# Patient Record
Sex: FEMALE | Race: BLACK OR AFRICAN AMERICAN | NOT HISPANIC OR LATINO | Employment: FULL TIME | URBAN - METROPOLITAN AREA
[De-identification: names, ages, dates, MRNs, and addresses within clinical notes are randomized per-mention and may not be internally consistent; named-entity substitution may affect disease eponyms.]

---

## 2023-10-18 ENCOUNTER — NUTRITION (OUTPATIENT)
Dept: SURGERY | Facility: CLINIC | Age: 38
End: 2023-10-18
Payer: COMMERCIAL

## 2023-10-18 VITALS — HEIGHT: 68 IN | BODY MASS INDEX: 42.28 KG/M2 | WEIGHT: 279 LBS

## 2023-10-18 NOTE — PROGRESS NOTES
"PREOPERATIVE, MULTIDISCIPLINARY, MEDICALLY SUPERVISED, REDUCED CALORIE DIET, BEHAVIOR MODIFICATION AND EXERCISE PROGRAM    S:  The patient has been working on modifying her diet.  24 hour food recall: B: 2 boiled eggs; L: grilled chicken salad with light dressing.; D: baked skinless nikos hen, white rice and steamed vegetables.    O:     Vitals:    10/18/23 1505   Weight: 127 kg (279 lb)    Ht:   1.727 m (5' 8\")     BMI: Body mass index is 42.42 kg/m².    Goal: 5% body weight loss over the course of program    Dietary recommendation:   1. Continue to drink at least 64 ounces of water and calorie free, non-carbonated, and decaffeinated beverages  2. Practice the 30-30-30 rule by drinking between meals.  3. Structure your meal plan - have 3 meals and 1 snack daily.  Focus meals on lean protein and produce and reduce starch portions.  4. Have balanced meals that always contain a good source of protein.  5. Increase intake of non-starchy vegetables.  Have 5 servings fruits and vegetables daily.   6. Continue to take a multivitamin daily.  7. Increase physical activity by 10-15 minutes to an end goal of 60 minutes 5 x per week.    Group Topic: Eating Triggers and Controlling Environment    Behavioral recommendation: Pt will be able to identify eating triggers and how to avoid them.    A/P: Pt appears to be able to recognize eating triggers and how to avoid eating when not hungry and/or not eating when it is not time for a meal or snack. Pt will continue to practice being mindful of healthy eating habits.    Exercise: Walking 3x/wk for 45 min. And light weights for 15 min.    Follow-up on 11/15/23 (3/6 mswl).    Aicha Jarvis, MARCELINO, LD  "

## 2023-11-08 ENCOUNTER — DOCUMENTATION (OUTPATIENT)
Dept: SURGERY | Facility: CLINIC | Age: 38
End: 2023-11-08
Payer: COMMERCIAL

## 2023-11-09 ENCOUNTER — APPOINTMENT (OUTPATIENT)
Dept: BEHAVIORAL HEALTH | Facility: CLINIC | Age: 38
End: 2023-11-09
Payer: COMMERCIAL

## 2023-11-15 ENCOUNTER — NUTRITION (OUTPATIENT)
Dept: SURGERY | Facility: CLINIC | Age: 38
End: 2023-11-15
Payer: COMMERCIAL

## 2023-11-15 VITALS — HEIGHT: 68 IN | WEIGHT: 276 LBS | BODY MASS INDEX: 41.83 KG/M2

## 2023-11-15 NOTE — PROGRESS NOTES
"PREOPERATIVE, MULTIDISCIPLINARY, MEDICALLY SUPERVISED, REDUCED CALORIE DIET, BEHAVIOR MODIFICATION AND EXERCISE PROGRAM    S:  The patient is working on practicing pre-op behaviors.  24 hour food recall:  B: 2 boiled eggs with fruit cup in own juice; s: protein snack with cheese and nuts; L: 2 eggs, slice turkey patel and 2 slice multigrain toast; D: beef with noodles and gravy  O:     Vitals:    11/15/23 1506   Weight: 125 kg (276 lb)    Ht:   1.727 m (5' 8\")     BMI: Body mass index is 41.97 kg/m².    Goal: 5% body weight loss over the course of program    Dietary recommendation:   1. Continue to drink at least 64 ounces of water and calorie free, non-carbonated, and decaffeinated beverages  2. Practice the 30-30-30 rule by drinking between meals.  3. Structure your meal plan - have 3 meals and 1 snack daily.  4. Have balanced meals that always contain a good source of protein.  Focus meals mainly on protein and vegetables at lunch and dinner.  5. Increase intake of non-starchy vegetables.  Have 5 servings fruits and vegetables daily.   6. Continue to take a multivitamin daily.  7. Increase physical activity by 10-15 minutes to an end goal of 60 minutes 5 x per week.    Group Topic: Healthy Holiday Meal Planning    Behavioral recommendation: Patient is encouraged to choose healthy foods, along with ways to modify recipes as part of meal planning during the holiday season.     A/P: Pt appears to have a good understanding of how to incorporate health foods as part of holiday meal plans into her daily meal pattern.  Pt has a goal to consume Healthier choices as part of the holiday meal plans in their appropriate portion sizes.    Exercise: Walking 3x/wk for 60 min.      Aicha Jarvis, MARCELINO, LD  "

## 2023-12-15 ENCOUNTER — NUTRITION (OUTPATIENT)
Dept: SURGERY | Facility: CLINIC | Age: 38
End: 2023-12-15
Payer: COMMERCIAL

## 2023-12-15 VITALS — WEIGHT: 275 LBS | HEIGHT: 68 IN | BODY MASS INDEX: 41.68 KG/M2

## 2023-12-15 NOTE — PROGRESS NOTES
"PREOPERATIVE, MULTIDISCIPLINARY, MEDICALLY SUPERVISED, REDUCED CALORIE DIET, BEHAVIOR MODIFICATION AND EXERCISE PROGRAM    S:  The patient is working on modifying her diet.   24 hour food recall: B: sf applesauce; s: handful Chex mix; L: sliced turkey on wheat bread with lettuce and light butts; D: baked skinless chicken, green beans and mac and cheese    O:    Vitals:    12/15/23 1104   Weight: 125 kg (275 lb)      Ht:  1.727 m (5' 8\")   BMI:  Body mass index is 41.81 kg/m².    Goal: 5% body weight loss over the course of program    Dietary recommendation:   1. Continue to drink at least 64 ounces water and calorie free, non-carbonated, and decaffeinated beverages  2. Practice the 30-30-30 rule by drinking between meals.  3. Structure your meal plan - have 3 meals and 1 snack daily.  4. Have balanced meals that always contain a good source of protein. Try Greek yogurt, eggs or Quest protein bar for breakfast and focus on eating at least 3 oz. Lean protein coupled with produce at lunch and dinner.  5. Increase intake of non-starchy vegetables.  Have 5 servings fruits and vegetables daily.   6. Continue to take a multivitamin daily.  7. Increase physical activity by 10-15 minutes to an end goal of 60 minutes 5 x per week.    Group Topic: Emotional Eating    Behavioral recommendation: Pt is encouraged to practice identifying the differences between emotional hunger and physical hunger and to implement distraction and confrontation techniques to avoid emotional eating.     A/P: Pt appears to have a good understanding of how to characterize the two different types of hunger.  Pt has a goal to keep a food diary and to utilize at least two of the techniques learned in class to deter emotional eating.    Exercise: walking 15 min. and light resistance exercise 30 min. 4/wk    Follow-up next month (5/6 mswl visits)    Aicha Jarvis RD, LD  "

## 2024-01-18 ENCOUNTER — NUTRITION (OUTPATIENT)
Dept: SURGERY | Facility: CLINIC | Age: 39
End: 2024-01-18
Payer: COMMERCIAL

## 2024-01-18 VITALS — BODY MASS INDEX: 41.68 KG/M2 | WEIGHT: 275 LBS | HEIGHT: 68 IN

## 2024-01-18 NOTE — PROGRESS NOTES
"PREOPERATIVE, MULTIDISCIPLINARY, MEDICALLY SUPERVISED, REDUCED CALORIE DIET, BEHAVIOR MODIFICATION AND EXERCISE PROGRAM    S:  The patient is working on modifying her diet.  24 hour food recall: B: 2 eggs, 2 sausage; L skipped lunch; D: baked skinless chicken, greens, spaghetti with ground turkey.    O:    Vitals:    01/18/24 1105   Weight: 125 kg (275 lb)    Ht:   1.727 m (5' 8\")    BMI: Body mass index is 41.81 kg/m².    Goal: 5% body weight loss over the course of program    Dietary recommendation:   1. Continue to work on drinking calorie fee, non-carbonated, and decaffeinated beverages  2. Practice the 30-30-30 rule by drinking between meals.  3. Structure your meal plan - have 3 meals and 1 snack daily. Measure food portions.  Try Healthy Choice frozen light protein based meals and use tray to help portion homemade meals.  Limit starch to 1/2 cup or less per meal and eat 3 meals daily each with 15-30 grams protein. Try Greek yogurt, low fat cottage cheese or tuna when you want a lighter meal.  4. Have balanced meals that always contain a good source of protein.  5. Increase intake of non-starchy vegetables.  Have 5 servings fruits and vegetables daily.   6. Continue to take a multivitamin daily.  7. Increase physical activity by 10-15 minutes to an end goal of 60 minutes 5 x per week.    Group Topic: Label Reading   Behavioral recommendation: Pt is encouraged to read labels regularly to identify the key information on food label ingredient lists; such as fats, carbohydrates, protein and serving sizes.    A/P: Pt appears to have a good understanding of how to read labels for important nutritional information. Pt has a goal to read labels at home or when purchasing food at the supermarket.    Exercise: walking 15-20 min. 3x/wk    Scheduled for [facility] zoom on 2/07/24 and will follow-up in 1 month for 6/6 mswl visit.    Aicha Jarvis, MARCELINO, LD  "

## 2024-02-07 ENCOUNTER — TELEMEDICINE CLINICAL SUPPORT (OUTPATIENT)
Dept: SURGERY | Facility: CLINIC | Age: 39
End: 2024-02-07
Payer: COMMERCIAL

## 2024-02-07 NOTE — PROGRESS NOTES
PREOPERATIVE, MULTIDISCIPLINARY, MEDICALLY SUPERVISED, REDUCED CALORIE DIET, BEHAVIOR MODIFICATION AND EXERCISE PROGRAM    S:  The patient attended a 60 minute class today to review the 2 week pre-op diet, post-op eating guidelines and post-op vitamin/mineral supplements.     Weight: no weight was reported today.     Dietary recommendation:   1. Continue to work on eating 60-70 grams protein daily.  2. Continue to drink 64 ounces of water and calorie free, non-carbonated and decaffeinated beverages daily.  3. Continue to practice the 30-30-30 rule by drinking between meals.  4. Continue to take your MVI and discontinue taking your MVI 1 week before surgery.  5. Continue regular exercise with a longer term goal of 5 times weekly for 60 minutes.  5. Start the 2 week pre-op diet 2 weeks before your scheduled surgery date.  6. The day before surgery, drink clear fluids only.  No shakes and no red dyes.    A/P: Pt appears to understand the 2 week pre-op diet.  Reviewed post-op vit/mineral supplements and post-op full liquid and the post-op eating progression for the first 6 weeks post-op.  Encouraged patient to follow-up as scheduled before and after surgery.       Aicha Jarvis, MARCELINO, LD

## 2024-02-22 ENCOUNTER — NUTRITION (OUTPATIENT)
Dept: SURGERY | Facility: CLINIC | Age: 39
End: 2024-02-22
Payer: COMMERCIAL

## 2024-02-22 VITALS — BODY MASS INDEX: 41.22 KG/M2 | HEIGHT: 68 IN | WEIGHT: 272 LBS

## 2024-02-22 NOTE — PROGRESS NOTES
"PREOPERATIVE, MULTIDISCIPLINARY, MEDICALLY SUPERVISED, REDUCED CALORIE DIET, BEHAVIOR MODIFICATION AND EXERCISE PROGRAM    S: The patient has been working on practicing pre-op behaviors.  24 hour food recall: B: plain oatmeal and Greek yogurt;   L: salad with tuna and light ranch; D: frozen light turkey meatball.; s: sf applesauce    O:   Vitals:    02/22/24 1107   Weight: 123 kg (272 lb)    Ht:   1.727 m (5' 8\")     BMI: Body mass index is 41.36 kg/m².    Goal: 5% body weight loss over the course of program  Dietary recommendation:   1. Continue to drink at least 64 ounces of water and calorie free, non-carbonated, and decaffeinated beverages  2. Practice the 30-30-30 rule by drinking between meals.  3. Structure your meal plan - have 3 meals and 1 snack daily.  4. Have balanced meals that always contain a good source of protein.  5. Increase intake of non-starchy vegetables.  Have 5 servings fruits and vegetables daily.   6. Continue to take a multivitamin daily.  7. Increase physical activity by 10-15 minutes to an end goal of 60 minutes 5 x per week.    Group Topic: Portion Control   Behavioral recommendation: Pt is encouraged to identify and use the appropriate serving sizes from each food group.    A/P: Pt appears to have a good understanding of how to incorporate appropriate portion sizes into their daily meal pattern.  Pt has a goal to begin weighing and measuring portions until able to visualize the appropriate portion size.    Exercise: 30 min. 3x/wk resistance and 15 min. cardio    The patient has nutritional clearance.  She will follow-up in 1 month for accountability ahead of surgery.    Aicha Jarvis, RD, LD  "

## 2024-03-28 ENCOUNTER — DOCUMENTATION (OUTPATIENT)
Dept: SURGERY | Facility: CLINIC | Age: 39
End: 2024-03-28

## 2024-03-28 ENCOUNTER — NUTRITION (OUTPATIENT)
Dept: SURGERY | Facility: CLINIC | Age: 39
End: 2024-03-28
Payer: COMMERCIAL

## 2024-03-28 VITALS — BODY MASS INDEX: 40.62 KG/M2 | WEIGHT: 268 LBS | HEIGHT: 68 IN

## 2024-03-28 NOTE — PROGRESS NOTES
"PREOPERATIVE, MULTIDISCIPLINARY, MEDICALLY SUPERVISED, REDUCED CALORIE DIET, BEHAVIOR MODIFICATION AND EXERCISE PROGRAM    S:  The patient has been working on practicing pre-op behaviors.  24 hour food recall: B: 3 boiled eggs, 2 slices patel, slices wheat toast with tsp. peanut butter; s:Greek yogurt and grapes; L: salad with cheese, tuna, croutons and regular dressing; s: protein snack pack with sausage and cheese;       D; frozen light chicken meal with broccoli; s: sf popsicle    O:    Vitals:    03/28/24 1058   Weight: 122 kg (268 lb)     Ht:   1.727 m (5' 8\")    BMI: Body mass index is 40.75 kg/m².    Goal: 5% body weight loss over the course of program    Dietary recommendation:   1. Continue to drink at least 64 ounces of water and calorie free, non-carbonated, and decaffeinated beverages  2. Practice the 30-30-30 rule by drinking between meals.  3. Structure your meal plan - have 3 meals and 1 snack daily.  4. Have balanced meals that always contain a good source of protein.  5. Increase intake of non-starchy vegetables.  Have 5 servings fruits and vegetables daily.   6. Continue to take a multivitamin daily.  7. Increase physical activity by 10-15 minutes to an end goal of 60 minutes 5 x per week.    Group Topic: Going Lean With Protein  Behavioral recommendation: Pt is encouraged to identify and recall sources of lean protein.    A/P: Pt appears to have a good understanding of how to incorporate lean protein into their daily meal pattern.  Pt has a goal to add a lean protein source to each meal.    Exercise: cardio and resistance 4x/wk for 20-30 min. Each    Follow-up in 1 month      Aicha Jarvis, MARCELINO, LD  "

## 2024-04-09 ENCOUNTER — TELEMEDICINE (OUTPATIENT)
Dept: BEHAVIORAL HEALTH | Facility: CLINIC | Age: 39
End: 2024-04-09
Payer: COMMERCIAL

## 2024-04-09 DIAGNOSIS — E66.01 PSYCHOLOGICAL FACTORS AFFECTING MORBID OBESITY (MULTI): Primary | ICD-10-CM

## 2024-04-09 DIAGNOSIS — F54 PSYCHOLOGICAL FACTORS AFFECTING MORBID OBESITY (MULTI): Primary | ICD-10-CM

## 2024-04-09 PROCEDURE — 90791 PSYCH DIAGNOSTIC EVALUATION: CPT | Performed by: PSYCHOLOGIST

## 2024-04-09 NOTE — PROGRESS NOTES
BEHAVIORAL HEALTH ASSESSMENT FOR BARIATRIC SURGERY CANDIDACY    DATE OF EVALUATION: April 9, 2024    TIME IN SESSION: 60 minutes each meeting; Psych Assessment 30202    *THIS IS FIRST APPOINTMENT OF TWO NECESSARY TO COMPLETE THE FULL ASSESSMENT. BELOW INFORMATION WAS COLLECTED TODAY AND WILL BE COMBINED WITH DATA COLLECTED AT NEXT APPOINTMENT, TO GENERATE LETTER TO SURGEON AND BARIATRIC TEAM.*    Televideo Informed Consent for psychological evaluation was reviewed with the patient as follows:  There are potential benefits and risks of the use of telephone or video-conferencing that differ from in-person sessions. Specifically, the telephone or televideo system we are using may not be HIPPA compliant and may present limits to patient confidentiality. Confidentiality still applies for telepsychology services, and nobody will record the session without your permission.      Understanding and verbal agreement was attested to by the patient. Patient identity was confirmed using 3 sources, including telephone number, email address and date of birth. Provision of services via telehealth was necessitated by the restrictions on face-to-face visits accompanying the COVID-19 pandemic.     Non-secure Note: The patient has consented to an nonrestricted note.    ID/REFERRAL: Antionette Felix is a 37 yo able-bodied employed Black woman who I had the pleasure of meeting with todayor a Behavioral Health Pre-Surgical Bariatric Eval, at the request of her Bariatric Program.      Weight History  WHEN WEIGHT CONCERNS STARTED AND TRAJECTORY SINCE:  “I have always been heavy set, even as a kid” she gained significant weight when she was pregnant, at 290 lbs, and had trouble losing large amount and keeping weight off.    CAUSES OF WEIGHT GAIN: significant lifestyle changes that were hard to work around.    LOWEST ADULT WEIGHT: 220 lbs in her 20's   HIGHEST ADULT WEIGHT: 290 lbs when she had her daughter; lost a lot of it quickly after due to  water retention and postpartum factors. Did keep off about 50 lbs. for at least a year.     HOW MUCH OF ADULT LIFE SPENT TRYING TO LOSE WEIGHT: much of adult life on weight management either losing or trying to maintain.    HISTORY OF FOOD INSECURITY?:  No   SUCCESSFUL WEIGHT LOSS ATTEMPTS: daily exercise and diet changes to include vegan choices, done with best friend which kept her accountable.    UNSUCCESSFUL WEIGHT LOSS ATTEMPTS: Cabbage diet and a few other fad diets when she was a teen and early 20's, with her mother. Lost some weight, never kept it off.    HX OF COMPENSATORY BEHAVIORS?:   N self-induced vomiting   N laxatives   N diuretics   N diet pills  N excessive exercise  N fasting >24hrs.  N other     Psychosocial History  B/R in [facility], family all still lives close.  CURRENT LIVING SITUATION: her and her 4 year old daughter at home.    SOCIAL RELATIONSHIPS: Has a long term boyfriend. They do not live together. “Very supportive” and has made changes to his cooking to accommodate her diet changes; she cites him as a major support..Boyfriend, good friend, mother and brother live within 5 minutes of her.    SUPPORT PERSON: Boyfriend.    OTHER EMOTIONAL/INSTRUMENTAL SOCIAL SUPPORT: mother and brother. Boss had bariatric surgery and is supportive at work. Her daughter's  plans to keep her daughter for a few days overnight post-surgery.   REACTION OF SUPPORT SYSTEM TO SURGERY:  “very supportive, very focused” on helping her. She said her mother has been reminding her “this is a lifestyle change.”    HOW SURGERY MIGHT AFFECT RELATIONSHIPS: thinks it will improve, that the content of her family's humor will change, but not much else.    TRANSPORTATION: reliable vehicle   RESPONSIBILITIES: parent, work   HOW FAR DID YOU GO IN SCHOOL?: part college and completed [employer] training. Certification lapsed.  EMPLOYMENT: working with [employer] for 14 years “I'm all about positive mental health”. 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 Worked with this agency for 1 year, last agency for several years.   FINANCIAL SECURITY: stable income through work, stable housing and food security.   LEGAL PROBLEMS: none    Psychological Status: Ms Felix states that their history is negative for obsessive-compulsive disorder, eating disorders, rosalio, thought disorders, and alcohol and drug abuse.     Mental Status Exam:  Orientation:  Alert. Oriented x3.  Memory: intact.  Attention/Concentration: Normal/ Good.  Appearance:  Well-groomed. Casually Dressed. Good hygiene.   Behavior/Attitude: Cooperative. Pleasant. Good eye contact.  Motor: Relaxed. Calm. Normal motor activity.   Speech: Regular rate and volume. Fluent. No pressure.   Mood: Euthymic  Affect: Congruent to stated mood.   Thought process: Goal-directed. Linear. Organized.  Thought content: No paranoia, delusion or ideas of reference. No hallucinations in auditory, visual or other sensory modalities.   Suicidal ideation: denied.  Homicidal ideation: denied.   Insight: Good  Judgment: Good  Fund of knowledge: Average    Plan:  Ms. Felix will RTC within a month to complete the rest of her behavioral health assessment, at which time Letter to Surgeon will be generated.    Ms. Felix to continue with nutrition program and dietician, continue with physical activity and engaging with helpful coping strategies for reinforcement of positive experiences in her weight management efforts.       Tae Rollins, PhD  Clinical Psychologist  Division of Behavioral Medicine  Department of OB/GYN  [facility]  [phone]  Office: [phone]

## 2024-04-11 PROBLEM — E66.01 PSYCHOLOGICAL FACTORS AFFECTING MORBID OBESITY (MULTI): Status: ACTIVE | Noted: 2024-04-11

## 2024-04-11 PROBLEM — F54 PSYCHOLOGICAL FACTORS AFFECTING MORBID OBESITY (MULTI): Status: ACTIVE | Noted: 2024-04-11

## 2024-04-23 ENCOUNTER — TELEPHONE (OUTPATIENT)
Dept: SURGERY | Facility: CLINIC | Age: 39
End: 2024-04-23
Payer: COMMERCIAL

## 2024-04-23 NOTE — TELEPHONE ENCOUNTER
Left message reminding patient of upcoming appointment date/time and nurse contact number given for any further questions/concerns.

## 2024-04-26 ENCOUNTER — NUTRITION (OUTPATIENT)
Dept: SURGERY | Facility: CLINIC | Age: 39
End: 2024-04-26
Payer: COMMERCIAL

## 2024-04-26 VITALS — BODY MASS INDEX: 40.16 KG/M2 | WEIGHT: 265 LBS | HEIGHT: 68 IN

## 2024-04-26 NOTE — PROGRESS NOTES
"PREOPERATIVE, MULTIDISCIPLINARY, MEDICALLY SUPERVISED, REDUCED CALORIE DIET, BEHAVIOR MODIFICATION AND EXERCISE PROGRAM    S:  The patient has been working on pre-op behaviors.  24 hour food recall: B: 2 boiled eggs, Greek yogurt, plain oatmeal with berries; L: salad with egg, cheese, tuna and dressing sparingly; s; slim yodit; D: grilled skinless chicken and greens    O:    Vitals:    04/26/24 1035   Weight: 120 kg (265 lb)    Ht:   1.727 m (5' 8\")  BMI: Body mass index is 40.29 kg/m².    Goal: 5% body weight loss over the course of program    Dietary recommendation:   1. Continue to drink at least 64 ounces of water and calorie free, non-carbonated, and decaffeinated beverages  2. Practice the 30-30-30 rule by drinking between meals.  3. Structure your meal plan - have 3 meals and 1 snack daily.  4. Have balanced meals that always contain a good source of protein.  5. Increase intake of non-starchy vegetables.  Have 5 servings fruits and vegetables daily.   6. Continue to take a multivitamin daily.  7. Increase physical activity by 10-15 minutes to an end goal of 60 minutes 5 x per week.    Group Topic: Get Movin'    Behavioral recommendation: Pt is encouraged to identify and try different types of physical activity.    A/P: Pt appears to have a good understanding of how to incorporate physical activity into their daily schedule.  Pt has a goal to begin making small time commitments each week to fit in 30 minutes of exercise on as many days as possible.    Exercise: resistance 45-60 min. 3x/wk and walking 1-2x/wk for 30-45 min.    The patient has previous nutritional clearance and will follow-up in 1 month.    Aicha Jarvis, RD, LD  "

## 2024-04-29 ENCOUNTER — APPOINTMENT (OUTPATIENT)
Dept: BEHAVIORAL HEALTH | Facility: CLINIC | Age: 39
End: 2024-04-29
Payer: COMMERCIAL

## 2024-04-29 DIAGNOSIS — F54 PSYCHOLOGICAL FACTORS AFFECTING MORBID OBESITY (MULTI): Primary | ICD-10-CM

## 2024-04-29 DIAGNOSIS — E66.01 PSYCHOLOGICAL FACTORS AFFECTING MORBID OBESITY (MULTI): Primary | ICD-10-CM

## 2024-04-29 PROCEDURE — 90791 PSYCH DIAGNOSTIC EVALUATION: CPT | Performed by: PSYCHOLOGIST

## 2024-04-30 ENCOUNTER — TELEPHONE (OUTPATIENT)
Dept: SURGERY | Facility: CLINIC | Age: 39
End: 2024-04-30
Payer: COMMERCIAL

## 2024-04-30 NOTE — PROGRESS NOTES
"Subjective   Date: 4/30/2024 Time: 12:19 PM  Name: Antionette Felix  MRN: [id number]    *Attempted to contact X 2, left message 4/30/2024*  This is a 38 y.o. female with morbid obesity BMI ~ 40 who presents to clinic for consideration of bariatric surgery. Initially considered WLS 4/2023, but did not complete the process. she has attempted and failed multiple diet and exercise regimens for weight loss. Initial Onset of obesity was {Initial Obesity Onset:25849:::0}.  Their goal for surgery is to  be healthier  and lose weight. The patient has tried multiple diets to lose weight including {Previous Diet Trials:16360}. The patient was most successful with the {Most Successful Diet:55849}. The most pounds lost on this diet were *** lbs. The patient considers their dietary weakness to be {Dietary Weakness:56827} The patient reports a  {Weight Pattern:58260::\"highest weight ever of *** pounds\",\"lowest weight ever of *** pounds\"} {Distribution of Obesity:96751}. Current diet: {Diet:47504}. Compliance: {Compliance:06122} Diet Problems: {Diet Problems:48702} } Dietary Details Include:{Dietary Details:25349} The patient {Exercise Frequency:54247} {Excercise Duration (Optional):13327} {Types of Exercise (Optional):11929}    {Comorbidities:15219}    {Menstrual History (Optional):21201}    {Procedure Preferred:96875}      {GERD QOL PPI USE:16647}    How bad is the heartburn? {GERDQOL:70864}  Heartburn when lying down? {GERDQOL:66429}  Heartburn when standing up? {GERDQOL:85865}  Heartburn after meals? {GERDQOL:64722}  Does heartburn change your diet? {GERDQOL:95852}  Does heartburn wake you from sleep? {GERDQOL:27870}  Do you have difficulty swallowing? {GERDQOL:68650}  Do you have pain with swallowing? {GERDQOL:37184}  If you take medication, does this affect your daily life? {GERDQOL:51206}  How bad is the regurgitation? {GERDQOL:54611}  Regurgitation when lying down? {GERDQOL:66775}  Regurgitation when standing up? " {GERDQOL:77540}  Regurgitation after meals? {GERDQOL:56111}  Does regurgitation change your diet? {GERDQOL:55694}  Does regurgitation wake you from sleep? {GERDQOL:33847}  How satisfied are you with your present condition? {satisfaction GERD QOL:35137}    PMH:   Past Medical History:   Diagnosis Date    Bariatric surgery status     Bariatric surgery status    Personal history of diseases of the skin and subcutaneous tissue 2021    History of eczema        PSH:   Past Surgical History:   Procedure Laterality Date    OTHER SURGICAL HISTORY  2020     section          FAMILY HISTORY:  No family history on file.     SOCIAL HISTORY:       MEDICATIONS:  Prior to Admission Medications:  Medication Documentation Review Audit    **Prior to Admission medications have not yet been reviewed**          ALLERGIES:  Not on File    REVIEW OF SYSTEMS:  GENERAL: Negative for malaise, significant weight loss and fever  HEAD: Negative for headache, swelling.  NECK: Negative for lumps, goiter, pain and significant neck swelling  RESPIRATORY: Negative for cough, wheezing or shortness of breath.  CARDIOVASCULAR: Negative for chest pain, leg swelling or palpitations.  GI: Negative for abdominal discomfort, blood in stools or black stools or change in bowel habits  : No history of dysuria, frequency or incontinence  MUSCULOSKELETAL: Negative for joint pain or swelling, back pain or muscle pain.  SKIN: Negative for lesions, rash, and itching.  PSYCH: Negative for sleep disturbance, mood disorder and recent psychosocial stressors.  ENDOCRINE: Negative for cold or heat intolerance, polyuria, polydipsia and goiter.    Objective   PHYSICAL EXAM:  There were no vitals taken for this visit.  General appearance: obese, NAD  Neuro: AOx3  Head: EOMI; no swelling or lesions of scalp or face  ENT:  no lumps or lymphadenopathy, thyroid normal to palpation; oropharynx clear, no swelling or erythema  Skin: warm, no erythema or  rashes  Lungs: clear to percussion and auscultation  Heart: regular rhythm and S1, S2 normal  Abdomen: soft, non-tender, no masses, no organomegaly  Extremities: Normal exam of the extremities. No swelling or pain.  Psych: no hurried speech, no flight of ideas, normal affect    IMPRESSION:  Antionette Felix is a 38 y.o. female with a bmi of There is no height or weight on file to calculate BMI. with the following diagnoses and co-morbidities: ***     We discussed the ***  at [facility] and all questions were answered. risks and benefits were discussed  The patient understands the risks and benefits of the procedure and how the procedure is performed. The patient understands the risks include but are not limited to bleeding, infection, DVT, PE, pneumonia, myocardial infarction, leak along the staple lines, and weight regain. We discussed lifestyle changes necessary to be successful.        ***    This patient does meet the criteria for a surgical weight loss procedure according to NIH guidelines.  The risks of sleeve gastrectomy, Jennifer-en-Y gastric bypass, and duodenal switch surgery including bleeding, leak, wound infection, dehydration, ulcers, internal hernia, DVT/PE, prolonged nausea/vomiting, incomplete resolution of associated medical conditions, reflux, weight regain, vitamin/mineral deficiencies, and death have been explained to the patient and Antionette Felix has expressed understanding and acceptance of them.     The increased risk of substance and alcohol abuse following bariatric surgery was discussed with the patient, along with the negative consequences of substance/alcohol use after surgery including addiction, worsening of mental health disorders, and injury to the stomach. The risk of smoking and vaping (tobacco or any other substance) after bariatric surgery was explained to the patient. This includes risk of anastamotic ulcers, gastritis, bleeding, perforation, stricture, and PO intolerance.  The patient  expressed understanding and acceptance of these risks.    ***    The benefits of the above surgeries including weight loss, improvement/resolution of associated medical and mental health conditions, improved mobility, and decreased mortality have been explained the the patient and Antionette Felix has expressed understanding and acceptance of them.  Assessment/Plan   PLAN:  The plan of treatment for Antionette Felix is to continue with the consultations and tests ordered today in hopes of qualifying for pre-operative clearance for bariatric surgery. This includes:  ***  Consult Nutrition for education and MSWL  Consult Psychology  Consult Physical Therapy for limited mobility  Consult cardiology  Consult pulmonology  Labs/CXR/EKG ordered  EGD  PCP for medical optimization  Consult sleep medicine - concern for CELY    The following are some lifestyle changes you should begin to prepare you for your *** surgery.   Eliminate soda and other carbonated beverages from your diet. Carbonation will not be well tolerated after surgery. Try Propel, Vitamin Water Zero, Sobe Lifewater, Crystal Light or water.    Increase fluid consumption to 64 oz daily. Do not drink within 30 minutes of eating as this will liquefy your food and make you hungry more quickly.    Exercise for 30-60 minutes daily. Brisk walking, bike riding and swimming are all examples of healthy exercise. If you are unable to exercise we recommend seated exercise.    Do not skip meals.    Take a multivitamin daily.    Lose weight. In preparation for your surgery it is important that you begin making healthier food choices now. Our dietitian will meet with you to help you select foods lower in calories and higher in nutrition. We would like you to lose at least ***  lbs prior to surgery.     Increase your protein intake to 60 grams per day.    Alcohol is empty calories. Please eliminate while preparing for surgery.    Plan your meals.      General Instruction: 1) Use the  information we gave you today to work through your insurance requirements and medical clearances.   2) These documents need to get faxed to the program navigators so they can submit them for approval from your insurance company.   3) Obtain labs today at a  facility. We will call you with any abnormalities and corrections you need to make.   4) Continue to work with your primary care doctor and other specialist so your other health problems are well controlled prior to your surgery.   5) Adopt the recommendations of the program dietician so you develop healthy eating patterns.   6) Work with the sleep team to get your sleep apnea treated to prevent other health problems .   7) Consider attending a support group to learn from other who have been through the process.   8) Come to the MSWL sessions.   45 minutes were spent with patient including history, physical exam, and education.

## 2024-05-07 ENCOUNTER — TELEPHONE (OUTPATIENT)
Dept: SURGERY | Facility: CLINIC | Age: 39
End: 2024-05-07
Payer: COMMERCIAL

## 2024-05-07 NOTE — PROGRESS NOTES
Subjective   Date: 5/13/2024 Time: 11:34 AM  Name: Antionette Felix  MRN: [id number]    -Spoke with patient 5/7/2024-    This is a 38 y.o. female with morbid obesity BMI=~40  who presents to clinic for consideration of bariatric surgery. she has attempted and failed multiple diet and exercise regimens for weight loss. Initial Onset of obesity was in childhood and after pregnancy 4 years ago .  Their goal for surgery is to  be healthier  and lose weight. The patient has tried multiple diets to lose weight including James, Exercise, Low Calorie, and Vegan, calorie counting . The patient was most successful with the  exercise and mindful eating . The most pounds lost on this diet were 50 lbs. The patient considers their dietary weakness to be  fast food, carb, portion size, emotional eater  The patient reports a  highest weight ever of 280 pounds and lowest weight ever of 220 pounds Distribution of Obesity: is general. Current diet:  liquid diet with small solid foods . Compliance: Strict Adherence Diet Problems: Insufficient Vegetables and Low in Fiber } Dietary Details Include:Dietary Details: 3 Servings of Fruit/Day, 2-3 Servings of Vegetables/Day, 2-3 Servings of Meat/Day, 0 Servings of Whole Grains/Day, 1 Servings of Simple Carbs/Day, 64 Ounces of Water/Day , 2 Servings of Dairy/Day, 0 Cups of Coffee/Day, 1 Cups of Tea/Day, 0 Cans of Regular Soda/Day, 0 Cans of Diet Soda/Day, and mindful eating  The patient exercises 3 times /week  3 Hours/Week Types of Exercise : treadmill, weight training    She is trying a liquid diet and follows a bariatric group on FB  She started the process last year but had stopped because of family members passing away and social stress so now that she feels better she's back on track. She is very excited and enthusiastic about the process this time.  No EGD or UGI.   No reflux/heartburn.     Procedure and Risk Discussed:   Gastric Sleeve: We discussed the risks and benefits of the gastric  sleeve at length. The patient understands the risks and benefits of the procedure and how the procedure is performed. The patient understands the risks include but are not limited to bleeding, infection, DVT, PE, pneumonia, myocardial infarction, leak along the staple lines, and weight regain.     38 yo female with bmi of 41 and comorbidities of fatigue and stress incontinence. All can be helped with weight loss.. We discussed she should not smoke anything for the rest of her life. We discussed lifestyle changes at length. We discussed going with the sleeve. She is agreeable to stopping her marijuana use. Counseled on exercise. All Qeustions were answered.    Last smoke was 3-4 months ago. She only ever used tobacco, always marijuana.  No medical card.       Comorbidities: weak knees (no NSAIDs)      SLEEVE Gastric Surgery For Morbid Obesity Laparoscopic Longitudinal Gastrectomy       Off PPI for never taking (how long) Denies GERD    How bad is the heartburn? 0 = No symptoms  Heartburn when lying down? 0 = No symptoms  Heartburn when standing up? 0 = No symptoms  Heartburn after meals? 0 = No symptoms  Does heartburn change your diet? 0 = No symptoms  Does heartburn wake you from sleep? 0 = No symptoms  Do you have difficulty swallowing? 0 = No symptoms  Do you have pain with swallowing? 0 = No symptoms  If you take medication, does this affect your daily life? 0 = No symptoms  How bad is the regurgitation? 0 = No symptoms  Regurgitation when lying down? 0 = No symptoms  Regurgitation when standing up? 0 = No symptoms  Regurgitation after meals? 0 = No symptoms  Does regurgitation change your diet? 0 = No symptoms  Does regurgitation wake you from sleep? 0 = No symptoms  How satisfied are you with your present condition? Satisfied    PMH:   Past Medical History:   Diagnosis Date    Bariatric surgery status     Bariatric surgery status    Personal history of diseases of the skin and subcutaneous tissue 07/28/2021     "History of eczema        PSH:   Past Surgical History:   Procedure Laterality Date     SECTION, CLASSIC      OTHER SURGICAL HISTORY  2020     section          FAMILY HISTORY:  Family History   Problem Relation Name Age of Onset    Diabetes Mother          SOCIAL HISTORY:  Social History     Tobacco Use    Smoking status: Former     Types: Cigarettes    Smokeless tobacco: Never   Substance Use Topics    Alcohol use: Not Currently       MEDICATIONS:  Prior to Admission Medications:  Medication Documentation Review Audit       Reviewed by Rachel Virk MD (Physician) on 24 at 1134      Medication Order Taking? Sig Documenting Provider Last Dose Status   cetirizine (ZyrTEC) 10 mg tablet [id number] Yes Take 1 tablet (10 mg) by mouth once daily. Historical Provider, MD  Active   multivitamin tablet [id number] Yes Take 1 tablet by mouth once daily. Historical Provider, MD  Active                 She also is on some other vitamin gummies    ALLERGIES:  No Known Allergies    REVIEW OF SYSTEMS:  GENERAL: Negative for malaise, significant weight loss and fever  HEAD: Negative for headache, swelling.  NECK: Negative for lumps, goiter, pain and significant neck swelling  RESPIRATORY: Negative for cough, wheezing or shortness of breath.  CARDIOVASCULAR: Negative for chest pain, leg swelling or palpitations.  GI: Negative for abdominal discomfort, blood in stools or black stools or change in bowel habits  : No history of dysuria, frequency or incontinence  MUSCULOSKELETAL: Negative for joint pain or swelling, back pain or muscle pain.  SKIN: Negative for lesions, rash, and itching.  PSYCH: Negative for sleep disturbance, mood disorder and recent psychosocial stressors.  ENDOCRINE: Negative for cold or heat intolerance, polyuria, polydipsia and goiter.    Objective   PHYSICAL EXAM:  Visit Vitals  /78   Pulse 76   Ht 1.753 m (5' 9\")   Wt 120 kg (264 lb)   BMI 38.99 kg/m²   Smoking Status Former "   BSA 2.42 m²     General appearance: obese, NAD  Neuro: AOx3  Head: EOMI; no swelling or lesions of scalp or face  ENT:  no lumps or lymphadenopathy, thyroid normal to palpation; oropharynx clear, no swelling or erythema  Skin: warm, no erythema or rashes  Lungs: clear to percussion and auscultation  Heart: regular rhythm and S1, S2 normal  Abdomen: soft, non-tender, no masses, no organomegaly  Extremities: Normal exam of the extremities. No swelling or pain.  Psych: no hurried speech, no flight of ideas, normal affect    IMPRESSION:  Antionette Felix is a 38 y.o. female with a bmi of Body mass index is 38.99 kg/m². with the following diagnoses and co-morbidities: knee weakness.  All can be helped with weight loss.  She is a great candidate for Weight loss surgery, also has stress inctinence and fatigue.  She is eager for a sleeve and has already quit wmoking marijuana 4 motnhs ago.    Notes not as hungry since quit smoking marijuana.  She notes this has been a good change and has lost some weight since she and I spoke the last time.  She is making better food choices.   We discussed the sleeve  at length and all questions were answered. risks and benefits were discussed  The patient understands the risks and benefits of the procedure and how the procedure is performed. The patient understands the risks include but are not limited to bleeding, infection, DVT, PE, pneumonia, myocardial infarction, leak along the staple lines, and weight regain. We discussed lifestyle changes necessary to be successful.       This patient does meet the criteria for a surgical weight loss procedure according to NIH guidelines.  The risks of sleeve gastrectomy, Jennifer-en-Y gastric bypass, and duodenal switch surgery including bleeding, leak, wound infection, dehydration, ulcers, internal hernia, DVT/PE, prolonged nausea/vomiting, incomplete resolution of associated medical conditions, reflux, weight regain, vitamin/mineral deficiencies, and  death have been explained to the patient and Antionette Felix has expressed understanding and acceptance of them.     The increased risk of substance and alcohol abuse following bariatric surgery was discussed with the patient, along with the negative consequences of substance/alcohol use after surgery including addiction, worsening of mental health disorders, and injury to the stomach. The risk of smoking and vaping (tobacco or any other substance) after bariatric surgery was explained to the patient. This includes risk of anastamotic ulcers, gastritis, bleeding, perforation, stricture, and PO intolerance.  The patient expressed understanding and acceptance of these risks.    The benefits of the above surgeries including weight loss, improvement/resolution of associated medical and mental health conditions, improved mobility, and decreased mortality have been explained the the patient and Antionette Felix has expressed understanding and acceptance of them.  Assessment/Plan   PLAN:  The plan of treatment for Antionette Felix is to continue with the consultations and tests ordered today in hopes of qualifying for pre-operative clearance for bariatric surgery. This includes:    Consult Nutrition for education and MSWL  Consult Psychology  Consult Physical Therapy for limited mobility  Consult cardiology  Consult pulmonology  Labs/CXR/EKG ordered  EGD  PCP for medical optimization  Consult sleep medicine - concern for CELY  Meal prep  The following are some lifestyle changes you should begin to prepare you for your sleeve surgery.   Eliminate soda and other carbonated beverages from your diet. Carbonation will not be well tolerated after surgery. Try Propel, Vitamin Water Zero, Sobe Lifewater, Crystal Light or water.    Increase fluid consumption to 64 oz daily. Do not drink within 30 minutes of eating as this will liquefy your food and make you hungry more quickly.    Exercise for 30-60 minutes daily. Brisk walking, bike riding and  swimming are all examples of healthy exercise. If you are unable to exercise we recommend seated exercise.    Do not skip meals.    Take a multivitamin daily.    Lose weight. In preparation for your surgery it is important that you begin making healthier food choices now. Our dietitian will meet with you to help you select foods lower in calories and higher in nutrition. We would like you to lose at least 15-20  lbs prior to surgery.     Increase your protein intake to 60 grams per day.    Alcohol is empty calories. Please eliminate while preparing for surgery.    Plan your meals.      General Instruction: 1) Use the information we gave you today to work through your insurance requirements and medical clearances.   2) These documents need to get faxed to the program navigators so they can submit them for approval from your insurance company.   3) Obtain labs today at a  facility. We will call you with any abnormalities and corrections you need to make.   4) Continue to work with your primary care doctor and other specialist so your other health problems are well controlled prior to your surgery.   5) Adopt the recommendations of the program dietician so you develop healthy eating patterns.   6) Work with the sleep team to get your sleep apnea treated to prevent other health problems .   7) Consider attending a support group to learn from other who have been through the process.   8) Come to the MSWL sessions.   45 minutes were spent with patient including history, physical exam, and education.

## 2024-05-07 NOTE — TELEPHONE ENCOUNTER
Spoke with patient and reminded of upcoming appointment date/time, completed interview, and nurse contact number given for any further questions/concerns.

## 2024-05-13 ENCOUNTER — HOSPITAL ENCOUNTER (OUTPATIENT)
Dept: RADIOLOGY | Facility: HOSPITAL | Age: 39
Discharge: HOME | End: 2024-05-13
Payer: COMMERCIAL

## 2024-05-13 ENCOUNTER — LAB (OUTPATIENT)
Dept: LAB | Facility: LAB | Age: 39
End: 2024-05-13
Payer: COMMERCIAL

## 2024-05-13 ENCOUNTER — OFFICE VISIT (OUTPATIENT)
Dept: SURGERY | Facility: CLINIC | Age: 39
End: 2024-05-13
Payer: COMMERCIAL

## 2024-05-13 VITALS
DIASTOLIC BLOOD PRESSURE: 78 MMHG | HEART RATE: 76 BPM | SYSTOLIC BLOOD PRESSURE: 120 MMHG | BODY MASS INDEX: 39.1 KG/M2 | WEIGHT: 264 LBS | HEIGHT: 69 IN

## 2024-05-13 DIAGNOSIS — E66.01 MORBID OBESITY (MULTI): ICD-10-CM

## 2024-05-13 DIAGNOSIS — E66.01 PSYCHOLOGICAL FACTORS AFFECTING MORBID OBESITY (MULTI): ICD-10-CM

## 2024-05-13 DIAGNOSIS — Z01.818 PREOPERATIVE CLEARANCE: ICD-10-CM

## 2024-05-13 DIAGNOSIS — M25.561 PAIN IN BOTH KNEES, UNSPECIFIED CHRONICITY: ICD-10-CM

## 2024-05-13 DIAGNOSIS — F54 PSYCHOLOGICAL FACTORS AFFECTING MORBID OBESITY (MULTI): ICD-10-CM

## 2024-05-13 DIAGNOSIS — Z98.84 BARIATRIC SURGERY STATUS: ICD-10-CM

## 2024-05-13 DIAGNOSIS — M25.562 PAIN IN BOTH KNEES, UNSPECIFIED CHRONICITY: ICD-10-CM

## 2024-05-13 LAB
ALBUMIN SERPL BCP-MCNC: 4.5 G/DL (ref 3.4–5)
ALP SERPL-CCNC: 48 U/L (ref 33–110)
ALT SERPL W P-5'-P-CCNC: 11 U/L (ref 7–45)
AMPHETAMINES UR QL SCN: NORMAL
ANION GAP SERPL CALC-SCNC: 15 MMOL/L (ref 10–20)
APTT PPP: 29 SECONDS (ref 27–38)
AST SERPL W P-5'-P-CCNC: 16 U/L (ref 9–39)
BARBITURATES UR QL SCN: NORMAL
BASOPHILS # BLD AUTO: 0.05 X10*3/UL (ref 0–0.1)
BASOPHILS NFR BLD AUTO: 0.6 %
BENZODIAZ UR QL SCN: NORMAL
BILIRUB SERPL-MCNC: 0.3 MG/DL (ref 0–1.2)
BUN SERPL-MCNC: 11 MG/DL (ref 6–23)
BZE UR QL SCN: NORMAL
CALCIUM SERPL-MCNC: 10.2 MG/DL (ref 8.6–10.3)
CANNABINOIDS UR QL SCN: NORMAL
CHLORIDE SERPL-SCNC: 103 MMOL/L (ref 98–107)
CHOLEST SERPL-MCNC: 203 MG/DL (ref 0–199)
CHOLESTEROL/HDL RATIO: 4.8
CO2 SERPL-SCNC: 28 MMOL/L (ref 21–32)
CREAT SERPL-MCNC: 0.88 MG/DL (ref 0.5–1.05)
EGFRCR SERPLBLD CKD-EPI 2021: 87 ML/MIN/1.73M*2
EOSINOPHIL # BLD AUTO: 0.04 X10*3/UL (ref 0–0.7)
EOSINOPHIL NFR BLD AUTO: 0.5 %
ERYTHROCYTE [DISTWIDTH] IN BLOOD BY AUTOMATED COUNT: 12.9 % (ref 11.5–14.5)
FENTANYL+NORFENTANYL UR QL SCN: NORMAL
FERRITIN SERPL-MCNC: 222 NG/ML (ref 8–150)
GLUCOSE SERPL-MCNC: 86 MG/DL (ref 74–99)
HCT VFR BLD AUTO: 41.1 % (ref 36–46)
HDLC SERPL-MCNC: 42.7 MG/DL
HGB BLD-MCNC: 13.3 G/DL (ref 12–16)
IMM GRANULOCYTES # BLD AUTO: 0.02 X10*3/UL (ref 0–0.7)
IMM GRANULOCYTES NFR BLD AUTO: 0.3 % (ref 0–0.9)
INR PPP: 1 (ref 0.9–1.1)
IRON SATN MFR SERPL: 33 % (ref 25–45)
IRON SERPL-MCNC: 109 UG/DL (ref 35–150)
LDLC SERPL CALC-MCNC: 147 MG/DL
LYMPHOCYTES # BLD AUTO: 4.11 X10*3/UL (ref 1.2–4.8)
LYMPHOCYTES NFR BLD AUTO: 52.5 %
MCH RBC QN AUTO: 30.2 PG (ref 26–34)
MCHC RBC AUTO-ENTMCNC: 32.4 G/DL (ref 32–36)
MCV RBC AUTO: 93 FL (ref 80–100)
METHADONE UR QL SCN: NORMAL
MONOCYTES # BLD AUTO: 0.39 X10*3/UL (ref 0.1–1)
MONOCYTES NFR BLD AUTO: 5 %
NEUTROPHILS # BLD AUTO: 3.22 X10*3/UL (ref 1.2–7.7)
NEUTROPHILS NFR BLD AUTO: 41.1 %
NON HDL CHOLESTEROL: 160 MG/DL (ref 0–149)
NRBC BLD-RTO: 0 /100 WBCS (ref 0–0)
OPIATES UR QL SCN: NORMAL
OXYCODONE+OXYMORPHONE UR QL SCN: NORMAL
PCP UR QL SCN: NORMAL
PLATELET # BLD AUTO: 405 X10*3/UL (ref 150–450)
POTASSIUM SERPL-SCNC: 4.3 MMOL/L (ref 3.5–5.3)
PROT SERPL-MCNC: 7.4 G/DL (ref 6.4–8.2)
PROTHROMBIN TIME: 10.9 SECONDS (ref 9.8–12.8)
RBC # BLD AUTO: 4.4 X10*6/UL (ref 4–5.2)
SODIUM SERPL-SCNC: 142 MMOL/L (ref 136–145)
T4 FREE SERPL-MCNC: 0.79 NG/DL (ref 0.61–1.12)
TIBC SERPL-MCNC: 332 UG/DL (ref 240–445)
TRIGL SERPL-MCNC: 65 MG/DL (ref 0–149)
TSH SERPL-ACNC: 0.88 MIU/L (ref 0.44–3.98)
UIBC SERPL-MCNC: 223 UG/DL (ref 110–370)
VLDL: 13 MG/DL (ref 0–40)
WBC # BLD AUTO: 7.8 X10*3/UL (ref 4.4–11.3)

## 2024-05-13 PROCEDURE — 84630 ASSAY OF ZINC: CPT

## 2024-05-13 PROCEDURE — 84439 ASSAY OF FREE THYROXINE: CPT

## 2024-05-13 PROCEDURE — 83036 HEMOGLOBIN GLYCOSYLATED A1C: CPT

## 2024-05-13 PROCEDURE — 3008F BODY MASS INDEX DOCD: CPT | Performed by: SURGERY

## 2024-05-13 PROCEDURE — 85025 COMPLETE CBC W/AUTO DIFF WBC: CPT

## 2024-05-13 PROCEDURE — 84425 ASSAY OF VITAMIN B-1: CPT

## 2024-05-13 PROCEDURE — 82607 VITAMIN B-12: CPT

## 2024-05-13 PROCEDURE — 83540 ASSAY OF IRON: CPT

## 2024-05-13 PROCEDURE — 82525 ASSAY OF COPPER: CPT

## 2024-05-13 PROCEDURE — 36415 COLL VENOUS BLD VENIPUNCTURE: CPT

## 2024-05-13 PROCEDURE — 82306 VITAMIN D 25 HYDROXY: CPT

## 2024-05-13 PROCEDURE — 84443 ASSAY THYROID STIM HORMONE: CPT

## 2024-05-13 PROCEDURE — 85610 PROTHROMBIN TIME: CPT

## 2024-05-13 PROCEDURE — 99215 OFFICE O/P EST HI 40 MIN: CPT | Performed by: SURGERY

## 2024-05-13 PROCEDURE — 83550 IRON BINDING TEST: CPT

## 2024-05-13 PROCEDURE — 83970 ASSAY OF PARATHORMONE: CPT

## 2024-05-13 PROCEDURE — 71046 X-RAY EXAM CHEST 2 VIEWS: CPT | Performed by: STUDENT IN AN ORGANIZED HEALTH CARE EDUCATION/TRAINING PROGRAM

## 2024-05-13 PROCEDURE — 80307 DRUG TEST PRSMV CHEM ANLYZR: CPT

## 2024-05-13 PROCEDURE — 82746 ASSAY OF FOLIC ACID SERUM: CPT

## 2024-05-13 PROCEDURE — 80323 ALKALOIDS NOS: CPT

## 2024-05-13 PROCEDURE — 85730 THROMBOPLASTIN TIME PARTIAL: CPT

## 2024-05-13 PROCEDURE — 80053 COMPREHEN METABOLIC PANEL: CPT

## 2024-05-13 PROCEDURE — 80061 LIPID PANEL: CPT

## 2024-05-13 PROCEDURE — 82728 ASSAY OF FERRITIN: CPT

## 2024-05-13 PROCEDURE — 71046 X-RAY EXAM CHEST 2 VIEWS: CPT

## 2024-05-13 RX ORDER — BISMUTH SUBSALICYLATE 262 MG
1 TABLET,CHEWABLE ORAL DAILY
COMMUNITY

## 2024-05-13 RX ORDER — CETIRIZINE HYDROCHLORIDE 10 MG/1
10 TABLET ORAL DAILY
COMMUNITY

## 2024-05-13 NOTE — PATIENT INSTRUCTIONS
Consult Nutrition for education and MSWL  Consult Psychology  Consult Physical Therapy for limited mobility  Consult cardiology  Consult pulmonology  Labs/CXR/EKG ordered  EGD  PCP for medical optimization  Consult sleep medicine - concern for CELY  Meal prep  The following are some lifestyle changes you should begin to prepare you for your sleeve surgery.   Eliminate soda and other carbonated beverages from your diet. Carbonation will not be well tolerated after surgery. Try Propel, Vitamin Water Zero, Sobe Lifewater, Crystal Light or water.    Increase fluid consumption to 64 oz daily. Do not drink within 30 minutes of eating as this will liquefy your food and make you hungry more quickly.    Exercise for 30-60 minutes daily. Brisk walking, bike riding and swimming are all examples of healthy exercise. If you are unable to exercise we recommend seated exercise.    Do not skip meals.    Take a multivitamin daily.    Lose weight. In preparation for your surgery it is important that you begin making healthier food choices now. Our dietitian will meet with you to help you select foods lower in calories and higher in nutrition. We would like you to lose at least 15-20  lbs prior to surgery.     Increase your protein intake to 60 grams per day.    Alcohol is empty calories. Please eliminate while preparing for surgery.    Plan your meals.      General Instruction: 1) Use the information we gave you today to work through your insurance requirements and medical clearances.   2) These documents need to get faxed to the program navigators so they can submit them for approval from your insurance company.   3) Obtain labs today at a  facility. We will call you with any abnormalities and corrections you need to make.   4) Continue to work with your primary care doctor and other specialist so your other health problems are well controlled prior to your surgery.   5) Adopt the recommendations of the program dietician so you  develop healthy eating patterns.   6) Work with the sleep team to get your sleep apnea treated to prevent other health problems .   7) Consider attending a support group to learn from other who have been through the process.   8) Come to the MSWL sessions.

## 2024-05-14 DIAGNOSIS — Z98.84 BARIATRIC SURGERY STATUS: ICD-10-CM

## 2024-05-14 LAB
25(OH)D3 SERPL-MCNC: 24 NG/ML (ref 30–100)
EST. AVERAGE GLUCOSE BLD GHB EST-MCNC: 111 MG/DL
FOLATE SERPL-MCNC: 23.8 NG/ML
HBA1C MFR BLD: 5.5 %
PTH-INTACT SERPL-MCNC: 26.5 PG/ML (ref 18.5–88)
VIT B12 SERPL-MCNC: 440 PG/ML (ref 211–911)

## 2024-05-15 LAB
COPPER SERPL-MCNC: 119.9 UG/DL (ref 80–155)
ZINC SERPL-MCNC: 82.1 UG/DL (ref 60–120)

## 2024-05-16 LAB
COTININE SERPL-MCNC: <5 NG/ML
NICOTINE SERPL-MCNC: <5 NG/ML

## 2024-05-18 LAB — VIT B1 PYROPHOSHATE BLD-SCNC: 93 NMOL/L (ref 70–180)

## 2024-05-20 ENCOUNTER — APPOINTMENT (OUTPATIENT)
Dept: SURGERY | Facility: CLINIC | Age: 39
End: 2024-05-20
Payer: COMMERCIAL

## 2024-05-20 DIAGNOSIS — E66.01 MORBID OBESITY (MULTI): ICD-10-CM

## 2024-05-20 DIAGNOSIS — Z01.818 PREOPERATIVE CLEARANCE: ICD-10-CM

## 2024-05-20 DIAGNOSIS — Z98.84 BARIATRIC SURGERY STATUS: ICD-10-CM

## 2024-05-22 ENCOUNTER — ANESTHESIA EVENT (OUTPATIENT)
Dept: OPERATING ROOM | Facility: HOSPITAL | Age: 39
End: 2024-05-22
Payer: COMMERCIAL

## 2024-05-23 ENCOUNTER — ANESTHESIA (OUTPATIENT)
Dept: OPERATING ROOM | Facility: HOSPITAL | Age: 39
End: 2024-05-23
Payer: COMMERCIAL

## 2024-05-23 ENCOUNTER — HOSPITAL ENCOUNTER (OUTPATIENT)
Dept: OPERATING ROOM | Facility: HOSPITAL | Age: 39
Setting detail: OUTPATIENT SURGERY
Discharge: HOME | End: 2024-05-23
Payer: COMMERCIAL

## 2024-05-23 ENCOUNTER — NUTRITION (OUTPATIENT)
Dept: SURGERY | Facility: CLINIC | Age: 39
End: 2024-05-23
Payer: COMMERCIAL

## 2024-05-23 ENCOUNTER — PHARMACY VISIT (OUTPATIENT)
Dept: PHARMACY | Facility: CLINIC | Age: 39
End: 2024-05-23
Payer: MEDICAID

## 2024-05-23 VITALS
HEART RATE: 58 BPM | DIASTOLIC BLOOD PRESSURE: 76 MMHG | OXYGEN SATURATION: 99 % | WEIGHT: 262.35 LBS | SYSTOLIC BLOOD PRESSURE: 116 MMHG | RESPIRATION RATE: 18 BRPM | BODY MASS INDEX: 38.86 KG/M2 | TEMPERATURE: 97.3 F | HEIGHT: 69 IN

## 2024-05-23 VITALS — HEIGHT: 69 IN | WEIGHT: 262.35 LBS | BODY MASS INDEX: 38.86 KG/M2

## 2024-05-23 DIAGNOSIS — Z98.84 BARIATRIC SURGERY STATUS: ICD-10-CM

## 2024-05-23 DIAGNOSIS — E66.9 OBESITY, CLASS II, BMI 35-39.9: Primary | ICD-10-CM

## 2024-05-23 DIAGNOSIS — K29.30 CHRONIC SUPERFICIAL GASTRITIS WITHOUT BLEEDING: Primary | ICD-10-CM

## 2024-05-23 PROBLEM — E66.812 OBESITY, CLASS II, BMI 35-39.9: Status: ACTIVE | Noted: 2024-05-23

## 2024-05-23 LAB — PREGNANCY TEST URINE, POC: NEGATIVE

## 2024-05-23 PROCEDURE — 81025 URINE PREGNANCY TEST: CPT | Performed by: ANESTHESIOLOGY

## 2024-05-23 PROCEDURE — 43239 EGD BIOPSY SINGLE/MULTIPLE: CPT | Performed by: STUDENT IN AN ORGANIZED HEALTH CARE EDUCATION/TRAINING PROGRAM

## 2024-05-23 PROCEDURE — 3600000007 HC OR TIME - EACH INCREMENTAL 1 MINUTE - PROCEDURE LEVEL TWO: Performed by: ANESTHESIOLOGY

## 2024-05-23 PROCEDURE — 3700000002 HC GENERAL ANESTHESIA TIME - EACH INCREMENTAL 1 MINUTE: Performed by: ANESTHESIOLOGY

## 2024-05-23 PROCEDURE — 7100000010 HC PHASE TWO TIME - EACH INCREMENTAL 1 MINUTE: Performed by: ANESTHESIOLOGY

## 2024-05-23 PROCEDURE — 2500000005 HC RX 250 GENERAL PHARMACY W/O HCPCS: Performed by: NURSE ANESTHETIST, CERTIFIED REGISTERED

## 2024-05-23 PROCEDURE — 2500000004 HC RX 250 GENERAL PHARMACY W/ HCPCS (ALT 636 FOR OP/ED): Performed by: STUDENT IN AN ORGANIZED HEALTH CARE EDUCATION/TRAINING PROGRAM

## 2024-05-23 PROCEDURE — RXMED WILLOW AMBULATORY MEDICATION CHARGE

## 2024-05-23 PROCEDURE — 3600000002 HC OR TIME - INITIAL BASE CHARGE - PROCEDURE LEVEL TWO: Performed by: ANESTHESIOLOGY

## 2024-05-23 PROCEDURE — 2500000004 HC RX 250 GENERAL PHARMACY W/ HCPCS (ALT 636 FOR OP/ED): Performed by: NURSE ANESTHETIST, CERTIFIED REGISTERED

## 2024-05-23 PROCEDURE — 0753T DGTZ GLS MCRSCP SLD LEVEL IV: CPT | Mod: TC,GEALAB | Performed by: SURGERY

## 2024-05-23 PROCEDURE — 7100000009 HC PHASE TWO TIME - INITIAL BASE CHARGE: Performed by: ANESTHESIOLOGY

## 2024-05-23 PROCEDURE — 3700000001 HC GENERAL ANESTHESIA TIME - INITIAL BASE CHARGE: Performed by: ANESTHESIOLOGY

## 2024-05-23 RX ORDER — LIDOCAINE HYDROCHLORIDE 10 MG/ML
INJECTION, SOLUTION EPIDURAL; INFILTRATION; INTRACAUDAL; PERINEURAL AS NEEDED
Status: DISCONTINUED | OUTPATIENT
Start: 2024-05-23 | End: 2024-05-23

## 2024-05-23 RX ORDER — SODIUM CHLORIDE, SODIUM LACTATE, POTASSIUM CHLORIDE, CALCIUM CHLORIDE 600; 310; 30; 20 MG/100ML; MG/100ML; MG/100ML; MG/100ML
100 INJECTION, SOLUTION INTRAVENOUS CONTINUOUS
Status: DISCONTINUED | OUTPATIENT
Start: 2024-05-23 | End: 2024-05-24 | Stop reason: HOSPADM

## 2024-05-23 RX ORDER — PROPOFOL 10 MG/ML
INJECTION, EMULSION INTRAVENOUS AS NEEDED
Status: DISCONTINUED | OUTPATIENT
Start: 2024-05-23 | End: 2024-05-23

## 2024-05-23 RX ORDER — OMEPRAZOLE 40 MG/1
40 CAPSULE, DELAYED RELEASE ORAL
Qty: 30 CAPSULE | Refills: 2 | Status: SHIPPED | OUTPATIENT
Start: 2024-05-23 | End: 2024-08-21

## 2024-05-23 RX ORDER — MIDAZOLAM HYDROCHLORIDE 1 MG/ML
INJECTION INTRAMUSCULAR; INTRAVENOUS AS NEEDED
Status: DISCONTINUED | OUTPATIENT
Start: 2024-05-23 | End: 2024-05-23

## 2024-05-23 RX ORDER — FENTANYL CITRATE 50 UG/ML
INJECTION, SOLUTION INTRAMUSCULAR; INTRAVENOUS AS NEEDED
Status: DISCONTINUED | OUTPATIENT
Start: 2024-05-23 | End: 2024-05-23

## 2024-05-23 RX ORDER — SODIUM CHLORIDE, SODIUM LACTATE, POTASSIUM CHLORIDE, CALCIUM CHLORIDE 600; 310; 30; 20 MG/100ML; MG/100ML; MG/100ML; MG/100ML
20 INJECTION, SOLUTION INTRAVENOUS CONTINUOUS
Status: DISCONTINUED | OUTPATIENT
Start: 2024-05-23 | End: 2024-05-24 | Stop reason: HOSPADM

## 2024-05-23 RX ADMIN — PROPOFOL 30 MG: 10 INJECTION, EMULSION INTRAVENOUS at 08:39

## 2024-05-23 RX ADMIN — PROPOFOL 50 MG: 10 INJECTION, EMULSION INTRAVENOUS at 08:29

## 2024-05-23 RX ADMIN — FENTANYL CITRATE 50 MCG: 50 INJECTION, SOLUTION INTRAMUSCULAR; INTRAVENOUS at 08:23

## 2024-05-23 RX ADMIN — PROPOFOL 30 MG: 10 INJECTION, EMULSION INTRAVENOUS at 08:33

## 2024-05-23 RX ADMIN — PROPOFOL 30 MG: 10 INJECTION, EMULSION INTRAVENOUS at 08:35

## 2024-05-23 RX ADMIN — LIDOCAINE HYDROCHLORIDE 50 MG: 10 INJECTION, SOLUTION EPIDURAL; INFILTRATION; INTRACAUDAL; PERINEURAL at 08:29

## 2024-05-23 RX ADMIN — FENTANYL CITRATE 50 MCG: 50 INJECTION, SOLUTION INTRAMUSCULAR; INTRAVENOUS at 08:29

## 2024-05-23 RX ADMIN — MIDAZOLAM HYDROCHLORIDE 1 MG: 1 INJECTION, SOLUTION INTRAMUSCULAR; INTRAVENOUS at 08:26

## 2024-05-23 RX ADMIN — PROPOFOL 30 MG: 10 INJECTION, EMULSION INTRAVENOUS at 08:32

## 2024-05-23 RX ADMIN — PROPOFOL 30 MG: 10 INJECTION, EMULSION INTRAVENOUS at 08:37

## 2024-05-23 RX ADMIN — MIDAZOLAM HYDROCHLORIDE 1 MG: 1 INJECTION, SOLUTION INTRAMUSCULAR; INTRAVENOUS at 08:23

## 2024-05-23 RX ADMIN — SODIUM CHLORIDE, POTASSIUM CHLORIDE, SODIUM LACTATE AND CALCIUM CHLORIDE 20 ML/HR: 600; 310; 30; 20 INJECTION, SOLUTION INTRAVENOUS at 07:38

## 2024-05-23 SDOH — HEALTH STABILITY: MENTAL HEALTH: CURRENT SMOKER: 0

## 2024-05-23 ASSESSMENT — PAIN SCALES - GENERAL
PAINLEVEL_OUTOF10: 0 - NO PAIN
PAINLEVEL_OUTOF10: 0 - NO PAIN
PAIN_LEVEL: 2
PAINLEVEL_OUTOF10: 0 - NO PAIN

## 2024-05-23 ASSESSMENT — PAIN - FUNCTIONAL ASSESSMENT: PAIN_FUNCTIONAL_ASSESSMENT: 0-10

## 2024-05-23 NOTE — H&P
This is a 38 y.o. female with morbid obesity BMI=~40  who presents to clinic for consideration of bariatric surgery. she has attempted and failed multiple diet and exercise regimens for weight loss. Initial Onset of obesity was in childhood and after pregnancy 4 years ago .  Their goal for surgery is to  be healthier  and lose weight. The patient has tried multiple diets to lose weight including James, Exercise, Low Calorie, and Vegan, calorie counting . The patient was most successful with the  exercise and mindful eating . The most pounds lost on this diet were 50 lbs. The patient considers their dietary weakness to be  fast food, carb, portion size, emotional eater  The patient reports a  highest weight ever of 280 pounds and lowest weight ever of 220 pounds Distribution of Obesity: is general. Current diet:  liquid diet with small solid foods . Compliance: Strict Adherence Diet Problems: Insufficient Vegetables and Low in Fiber } Dietary Details Include:Dietary Details: 3 Servings of Fruit/Day, 2-3 Servings of Vegetables/Day, 2-3 Servings of Meat/Day, 0 Servings of Whole Grains/Day, 1 Servings of Simple Carbs/Day, 64 Ounces of Water/Day , 2 Servings of Dairy/Day, 0 Cups of Coffee/Day, 1 Cups of Tea/Day, 0 Cans of Regular Soda/Day, 0 Cans of Diet Soda/Day, and mindful eating  The patient exercises 3 times /week  3 Hours/Week Types of Exercise : treadmill, weight training     She is trying a liquid diet and follows a bariatric group on FB  She started the process last year but had stopped because of family members passing away and social stress so now that she feels better she's back on track. She is very excited and enthusiastic about the process this time.  No EGD or UGI.   No reflux/heartburn.      Procedure and Risk Discussed:   Gastric Sleeve: We discussed the risks and benefits of the gastric sleeve at length. The patient understands the risks and benefits of the procedure and how the procedure is  performed. The patient understands the risks include but are not limited to bleeding, infection, DVT, PE, pneumonia, myocardial infarction, leak along the staple lines, and weight regain.      36 yo female with bmi of 41 and comorbidities of fatigue and stress incontinence. All can be helped with weight loss.. We discussed she should not smoke anything for the rest of her life. We discussed lifestyle changes at length. We discussed going with the sleeve. She is agreeable to stopping her marijuana use. Counseled on exercise. All Qeustions were answered.    Last smoke was 3-4 months ago. She only ever used tobacco, always marijuana.  No medical card.         Comorbidities: weak knees (no NSAIDs)        SLEEVE Gastric Surgery For Morbid Obesity Laparoscopic Longitudinal Gastrectomy         Off PPI for never taking (how long) Denies GERD     How bad is the heartburn? 0 = No symptoms  Heartburn when lying down? 0 = No symptoms  Heartburn when standing up? 0 = No symptoms  Heartburn after meals? 0 = No symptoms  Does heartburn change your diet? 0 = No symptoms  Does heartburn wake you from sleep? 0 = No symptoms  Do you have difficulty swallowing? 0 = No symptoms  Do you have pain with swallowing? 0 = No symptoms  If you take medication, does this affect your daily life? 0 = No symptoms  How bad is the regurgitation? 0 = No symptoms  Regurgitation when lying down? 0 = No symptoms  Regurgitation when standing up? 0 = No symptoms  Regurgitation after meals? 0 = No symptoms  Does regurgitation change your diet? 0 = No symptoms  Does regurgitation wake you from sleep? 0 = No symptoms  How satisfied are you with your present condition? Satisfied     PMH:   Medical History        Past Medical History:   Diagnosis Date    Bariatric surgery status       Bariatric surgery status    Personal history of diseases of the skin and subcutaneous tissue 07/28/2021     History of eczema            PSH:   Surgical History         Past  "Surgical History:   Procedure Laterality Date     SECTION, CLASSIC        OTHER SURGICAL HISTORY   2020      section               FAMILY HISTORY:  Family History          Family History   Problem Relation Name Age of Onset    Diabetes Mother                SOCIAL HISTORY:  Social History            Tobacco Use    Smoking status: Former       Types: Cigarettes    Smokeless tobacco: Never   Substance Use Topics    Alcohol use: Not Currently         MEDICATIONS:  Prior to Admission Medications:  Medication Documentation Review Audit         Reviewed by Rachel Virk MD (Physician) on 24 at 1134       Medication Order Taking? Sig Documenting Provider Last Dose Status   cetirizine (ZyrTEC) 10 mg tablet [id number] Yes Take 1 tablet (10 mg) by mouth once daily. Historical Provider, MD   Active   multivitamin tablet [id number] Yes Take 1 tablet by mouth once daily. Historical Provider, MD   Active                     She also is on some other vitamin gummies     ALLERGIES:  No Known Allergies     REVIEW OF SYSTEMS:  GENERAL: Negative for malaise, significant weight loss and fever  HEAD: Negative for headache, swelling.  NECK: Negative for lumps, goiter, pain and significant neck swelling  RESPIRATORY: Negative for cough, wheezing or shortness of breath.  CARDIOVASCULAR: Negative for chest pain, leg swelling or palpitations.  GI: Negative for abdominal discomfort, blood in stools or black stools or change in bowel habits  : No history of dysuria, frequency or incontinence  MUSCULOSKELETAL: Negative for joint pain or swelling, back pain or muscle pain.  SKIN: Negative for lesions, rash, and itching.  PSYCH: Negative for sleep disturbance, mood disorder and recent psychosocial stressors.  ENDOCRINE: Negative for cold or heat intolerance, polyuria, polydipsia and goiter.           Objective   PHYSICAL EXAM:  Visit Vitals  /78   Pulse 76   Ht 1.753 m (5' 9\")   Wt 120 kg (264 lb)   BMI 38.99 " kg/m²   Smoking Status Former   BSA 2.42 m²      General appearance: obese, NAD  Neuro: AOx3  Head: EOMI; no swelling or lesions of scalp or face  ENT:  no lumps or lymphadenopathy, thyroid normal to palpation; oropharynx clear, no swelling or erythema  Skin: warm, no erythema or rashes  Lungs: clear to percussion and auscultation  Heart: regular rhythm and S1, S2 normal  Abdomen: soft, non-tender, no masses, no organomegaly  Extremities: Normal exam of the extremities. No swelling or pain.  Psych: no hurried speech, no flight of ideas, normal affect     IMPRESSION:  Antionette Felix is a 38 y.o. female with a bmi of Body mass index is 38.99 kg/m². with the following diagnoses and co-morbidities: knee weakness.  All can be helped with weight loss.  She is a great candidate for Weight loss surgery, also has stress inctinence and fatigue.  She is eager for a sleeve and has already quit wmoking marijuana 4 motnhs ago.    Notes not as hungry since quit smoking marijuana.  She notes this has been a good change and has lost some weight since she and I spoke the last time.  She is making better food choices.   We discussed the sleeve  at length and all questions were answered. risks and benefits were discussed  The patient understands the risks and benefits of the procedure and how the procedure is performed. The patient understands the risks include but are not limited to bleeding, infection, DVT, PE, pneumonia, myocardial infarction, leak along the staple lines, and weight regain. We discussed lifestyle changes necessary to be successful.         This patient does meet the criteria for a surgical weight loss procedure according to NIH guidelines.  The risks of sleeve gastrectomy, Jennifer-en-Y gastric bypass, and duodenal switch surgery including bleeding, leak, wound infection, dehydration, ulcers, internal hernia, DVT/PE, prolonged nausea/vomiting, incomplete resolution of associated medical conditions, reflux, weight regain,  vitamin/mineral deficiencies, and death have been explained to the patient and Antionette Felix has expressed understanding and acceptance of them.      The increased risk of substance and alcohol abuse following bariatric surgery was discussed with the patient, along with the negative consequences of substance/alcohol use after surgery including addiction, worsening of mental health disorders, and injury to the stomach. The risk of smoking and vaping (tobacco or any other substance) after bariatric surgery was explained to the patient. This includes risk of anastamotic ulcers, gastritis, bleeding, perforation, stricture, and PO intolerance.  The patient expressed understanding and acceptance of these risks.     The benefits of the above surgeries including weight loss, improvement/resolution of associated medical and mental health conditions, improved mobility, and decreased mortality have been explained the the patient and Antionette Felix has expressed understanding and acceptance of them.        Assessment/Plan   PLAN:  The plan of treatment for Antionette Felix is to continue with the consultations and tests ordered today in hopes of qualifying for pre-operative clearance for bariatric surgery. This includes:     Consult Nutrition for education and MSWL  Consult Psychology  Consult Physical Therapy for limited mobility  Consult cardiology  Consult pulmonology  Labs/CXR/EKG ordered  EGD  PCP for medical optimization  Consult sleep medicine - concern for CELY  Meal prep  The following are some lifestyle changes you should begin to prepare you for your sleeve surgery.   Eliminate soda and other carbonated beverages from your diet. Carbonation will not be well tolerated after surgery. Try Propel, Vitamin Water Zero, Sobe Lifewater, Crystal Light or water.    Increase fluid consumption to 64 oz daily. Do not drink within 30 minutes of eating as this will liquefy your food and make you hungry more quickly.    Exercise for 30-60  minutes daily. Brisk walking, bike riding and swimming are all examples of healthy exercise. If you are unable to exercise we recommend seated exercise.    Do not skip meals.    Take a multivitamin daily.    Lose weight. In preparation for your surgery it is important that you begin making healthier food choices now. Our dietitian will meet with you to help you select foods lower in calories and higher in nutrition. We would like you to lose at least 15-20  lbs prior to surgery.     Increase your protein intake to 60 grams per day.    Alcohol is empty calories. Please eliminate while preparing for surgery.    Plan your meals.       General Instruction: 1) Use the information we gave you today to work through your insurance requirements and medical clearances.   2) These documents need to get faxed to the program navigators so they can submit them for approval from your insurance company.   3) Obtain labs today at a  facility. We will call you with any abnormalities and corrections you need to make.   4) Continue to work with your primary care doctor and other specialist so your other health problems are well controlled prior to your surgery.   5) Adopt the recommendations of the program dietician so you develop healthy eating patterns.   6) Work with the sleep team to get your sleep apnea treated to prevent other health problems .   7) Consider attending a support group to learn from other who have been through the process.   8) Come to the MSWL sessions.

## 2024-05-23 NOTE — PROGRESS NOTES
"PREOPERATIVE, MULTIDISCIPLINARY, MEDICALLY SUPERVISED, REDUCED CALORIE DIET, BEHAVIOR MODIFICATION AND EXERCISE PROGRAM    S:  The patient has been practicing pre-op behaviors.  24 hour food recall: B: 2 eggs, tuna packet and Greek yogurt; L: 2 eggs and tuna packet over greens with light dressing; D: skinless chicken    O:   Vitals:    05/23/24 1010   Weight: 119 kg (262 lb 5.6 oz)      Ht:  1.753 m (5' 9\")   BMI:  Body mass index is 38.74 kg/m².    Goal: 5% body weight loss over the course of program    Dietary recommendation:   1. Continue to drink at least 64 ounces of calorie free, non-carbonated, and decaffeinated beverages  2. Practice the 30-30-30 rule by drinking between meals.  3. Structure your meal plan - have 3 meals and 1 snack daily.  4. Have balanced meals that always contain a good source of protein.  5. Increase intake of non-starchy vegetables.  Have 5 servings fruits and vegetables daily.   6. Continue to take a multivitamin daily.  7. Increase physical activity by 10-15 minutes to an end goal of 60 minutes 5 x per week.    Group Topic: Put Your Beverage to the Test  Behavioral recommendation: Pt is encouraged to identify appropriate beverages that can be tolerated post weight loss surgery.    A/P: Pt appears to have a good understanding of how to choose beverages that are appropriate for the weight loss surgery patient.  Pt has a goal to consume a minimum of 8 cups of sugar free, decaffeinated, non-carbonated beverages daily    Exercise: 3x/wk cardio 15 min. and resistance 45 min.; Walking 2x/wk 30 min.    Follow-up in 1 month for accountability ahead of surgery.    Aicha Jarvis, RD, LD  "

## 2024-05-23 NOTE — ANESTHESIA POSTPROCEDURE EVALUATION
Patient: Antionette Felix    Procedure Summary       Date: 05/23/24 Room / Location: [facility] OR    Anesthesia Start: 0823 Anesthesia Stop: 0853    Procedure: EGD Diagnosis:       BMI 40.0-44.9, adult (Multi)      Bariatric surgery status    Scheduled Providers: Machelle Contreras MD MPH; Sanya Pulido MD Responsible Provider: Sanya Pulido MD    Anesthesia Type: MAC ASA Status: 3            Anesthesia Type: MAC    Vitals Value Taken Time   /76 05/23/24 0919   Temp 36.3 °C (97.3 °F) 05/23/24 0849   Pulse 58 05/23/24 0919   Resp 18 05/23/24 0919   SpO2 99 % 05/23/24 0919       Anesthesia Post Evaluation    Patient location during evaluation: PACU  Patient participation: complete - patient participated  Level of consciousness: awake  Pain score: 2  Pain management: adequate  Multimodal analgesia pain management approach  Airway patency: patent  Two or more strategies used to mitigate risk of obstructive sleep apnea  Cardiovascular status: acceptable  Respiratory status: acceptable  Hydration status: acceptable  Postoperative Nausea and Vomiting: none    No notable events documented.

## 2024-05-23 NOTE — ANESTHESIA PREPROCEDURE EVALUATION
Patient: Antionette Felix    Procedure Information       Date/Time: 24 0845    Scheduled providers: Machelle Contreras MD MPH; Sanya Pulido MD    Procedure: EGD    Location: [facility] OR     Vitals:    24 0702   BP: 123/78   Pulse: 69   Resp: 18   Temp: 36.4 °C (97.5 °F)   SpO2: 98%       Past Surgical History:   Procedure Laterality Date   •  SECTION, CLASSIC     • OTHER SURGICAL HISTORY  2020     section     Past Medical History:   Diagnosis Date   • Bariatric surgery status     Bariatric surgery status   • Class 2 obesity with body mass index (BMI) of 38.0 to 38.9 in adult 2024   • Encounter for pre-operative cardiovascular clearance 2023    MARYANN Cook-CNP for Bariatric Procedure   • Personal history of diseases of the skin and subcutaneous tissue 2021    History of eczema   • Stress incontinence    • Suspected sleep apnea     Snoring       Current Outpatient Medications:   •  cetirizine (ZyrTEC) 10 mg tablet, Take 1 tablet (10 mg) by mouth once daily., Disp: , Rfl:   •  multivitamin tablet, Take 1 tablet by mouth once daily., Disp: , Rfl:     Current Facility-Administered Medications:   •  lactated Ringer's infusion, 100 mL/hr, intravenous, Continuous, Nick Powers MD  •  lactated Ringer's infusion, 20 mL/hr, intravenous, Continuous, Pavan Hicks MD, Last Rate: 20 mL/hr at 24 0738, 20 mL/hr at 24 0738  Prior to Admission medications    Medication Sig Start Date End Date Taking? Authorizing Provider   cetirizine (ZyrTEC) 10 mg tablet Take 1 tablet (10 mg) by mouth once daily.   Yes Historical Provider, MD   multivitamin tablet Take 1 tablet by mouth once daily.   Yes Historical Provider, MD     No Known Allergies  Social History     Tobacco Use   • Smoking status: Former     Types: Cigarettes   • Smokeless tobacco: Never   Substance Use Topics   • Alcohol use: Not Currently         Chemistry    Lab Results   Component  Value Date/Time     05/13/2024 1552    K 4.3 05/13/2024 1552     05/13/2024 1552    CO2 28 05/13/2024 1552    BUN 11 05/13/2024 1552    CREATININE 0.88 05/13/2024 1552    Lab Results   Component Value Date/Time    CALCIUM 10.2 05/13/2024 1552    ALKPHOS 48 05/13/2024 1552    AST 16 05/13/2024 1552    ALT 11 05/13/2024 1552    BILITOT 0.3 05/13/2024 1552          Lab Results   Component Value Date/Time    WBC 7.8 05/13/2024 1552    HGB 13.3 05/13/2024 1552    HCT 41.1 05/13/2024 1552     05/13/2024 1552     Lab Results   Component Value Date/Time    PROTIME 10.9 05/13/2024 1552    INR 1.0 05/13/2024 1552     No results found for this or any previous visit (from the past 4464 hour(s)).  No results found for this or any previous visit from the past 1095 days.        Relevant Problems   No relevant active problems       Clinical information reviewed:   Tobacco  Allergies  Meds   Med Hx  Surg Hx  OB Status  Fam Hx  Soc   Hx        NPO Detail:  NPO/Void Status  Carbohydrate Drink Given Prior to Surgery? : N  Date of Last Liquid: 05/22/24  Time of Last Liquid: 2300  Date of Last Solid: 05/22/24  Time of Last Solid: 2300  Last Intake Type: Clear fluids  Time of Last Void: 0700         Physical Exam    Airway  Mallampati: II  TM distance: >3 FB  Neck ROM: full     Cardiovascular   Rhythm: regular  Rate: normal     Dental    Pulmonary   Breath sounds clear to auscultation     Abdominal   Abdomen: soft  Bowel sounds: normal         Anesthesia Plan    History of general anesthesia?: yes  History of complications of general anesthesia?: no    ASA 3     MAC     The patient is not a current smoker.    intravenous induction   Anesthetic plan and risks discussed with patient.  Use of blood products discussed with who consented to blood products.    Plan discussed with CRNA and attending.

## 2024-05-24 ENCOUNTER — APPOINTMENT (OUTPATIENT)
Dept: OPERATING ROOM | Facility: HOSPITAL | Age: 39
End: 2024-05-24
Payer: COMMERCIAL

## 2024-06-03 LAB
LAB AP ASR DISCLAIMER: NORMAL
LABORATORY COMMENT REPORT: NORMAL
PATH REPORT.FINAL DX SPEC: NORMAL
PATH REPORT.GROSS SPEC: NORMAL
PATH REPORT.RELEVANT HX SPEC: NORMAL
PATH REPORT.TOTAL CANCER: NORMAL

## 2024-06-03 NOTE — PROGRESS NOTES
Patient: Antionette Felix    [id number]  : 1985 -- AGE 38 y.o.    Provider: MARYANN Shultz-CNP     Location [facility]   Service Date: 2024       Department of Medicine  Division of Pulmonary, Critical Care, and Sleep Medicine       [facility]  New Visit Note      HISTORY OF PRESENT ILLNESS     The patient's referring provider is: Pavan Hicks MD    PCP: None  Bariatrics: Dr. Pavan Hicks     HISTORY OF PRESENT ILLNESS   Antionette Felix is a 38 y.o. female who presents to a [facility] for an evaluation with concerns of bariatric surgery clearance.  I have independently interviewed and examined the patient in the office and reviewed available records.    Current History  Presents to pulmonary clinic after referral by bariatric surgery for pulmonary clearance for upcoming surgery; appearing that she is looking to get a gastric sleeve procedure. She is never cigarette smoker; but did smoke marijuana regularly up until 3/2024.    On today's visit, the patient reports no SOB at rest. Intermittent BACA depending on her activity. 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 Not limited in her ADL's. Works out 3 days a week. Able to walk up a hill. No chronic cough. Denies wheezing. No fever, sweats, chills. Weight has been stable; trying to continue to lose. No chronic rhinitis. Takes omeprazole for small hiatal hernia. No CP, palps. No orthopnea. No lower leg swelling.     Denies premature birth. No childhood pulmonary issues growing up. No pulmonary hospitalizations. Has never been on home oxygen therapy before.    Has never completed a sleep study before. Has one upcoming soon.    CAT Today: 3  ACT Today: 25  ESS Today: 2    Prior Notes & History       REVIEW OF SYSTEMS     REVIEW OF SYSTEMS  Review of Systems   Constitutional:  Negative for activity change, appetite change, chills,  fatigue, fever and unexpected weight change.   HENT:  Negative for congestion, postnasal drip, rhinorrhea, sinus pressure, sinus pain, sneezing, sore throat, trouble swallowing and voice change.         Denies throat clearing   Eyes:  Negative for redness and itching.   Respiratory:  Negative for cough, chest tightness, shortness of breath, wheezing and stridor.    Cardiovascular:  Negative for chest pain, palpitations and leg swelling.        Denies orthopnea   Gastrointestinal:  Negative for abdominal pain, diarrhea, nausea and vomiting.        Denies acid reflux   Musculoskeletal:  Positive for arthralgias. Negative for back pain, joint swelling and myalgias.   Skin:  Negative for rash.   Allergic/Immunologic: Negative for immunocompromised state.   Neurological:  Negative for dizziness, tremors, weakness and headaches.   Hematological:  Does not bruise/bleed easily.   Psychiatric/Behavioral:  Positive for sleep disturbance. Negative for agitation. The patient is nervous/anxious.         Denies depression   All other systems reviewed and are negative.      ALLERGIES AND MEDICATIONS     ALLERGIES  No Known Allergies    MEDICATIONS  Current Outpatient Medications   Medication Sig Dispense Refill    ascorbic acid/collagen hydr (COLLAGEN SKIN RENEWAL ORAL) Take by mouth. 1 Gummy daily      cetirizine (ZyrTEC) 10 mg tablet Take 1 tablet (10 mg) by mouth once daily.      multivitamin tablet Take 1 tablet by mouth once daily.      omega 3-dha-epa-fish oil (Fish OiL) 1,000 mg (120 mg-180 mg) capsule Take 1 capsule (1,000 mg) by mouth once daily.      omeprazole (PriLOSEC) 40 mg DR capsule Take 1 capsule (40 mg) by mouth once daily in the morning. Take before meals. Please remove capsule and use granules and mix with sugar free pudding or jello 30 capsule 2     No current facility-administered medications for this visit.       PAST HISTORY     PAST MEDICAL HISTORY  She  has a past medical history of Bariatric surgery  "status, Class 2 obesity with body mass index (BMI) of 38.0 to 38.9 in adult (2024), Encounter for pre-operative cardiovascular clearance (2023), Personal history of diseases of the skin and subcutaneous tissue (2021), Stress incontinence, and Suspected sleep apnea.    PAST SURGICAL HISTORY  Past Surgical History:   Procedure Laterality Date     SECTION, CLASSIC      OTHER SURGICAL HISTORY  2020     section       IMMUNIZATION HISTORY  Immunization History   Administered Date(s) Administered    Moderna SARS-CoV-2 Vaccination 2021, 2022       SOCIAL HISTORY  She  reports that she has never smoked. She has never used smokeless tobacco. She reports that she does not currently use alcohol. She reports that she does not currently use drugs after having used the following drugs: Marijuana.   Never cigarette smoker  Smoked marijuana regularly from age 27 up until 3/2024. Smoked daily. Used for anxiety control. 11 total years of daily smoking.    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      FAMILY HISTORY  Family History   Problem Relation Name Age of Onset    Diabetes Mother       PHYSICAL EXAM     VITAL SIGNS: /80   Pulse 68   Ht 1.753 m (5' 9\")   Wt 120 kg (265 lb)   LMP  (LMP Unknown)   SpO2 98%   BMI 39.13 kg/m²      PREVIOUS WEIGHTS:  Wt Readings from Last 3 Encounters:   24 120 kg (265 lb)   24 119 kg (262 lb 5.6 oz)   24 119 kg (262 lb 5.6 oz)       Physical Exam  Vitals reviewed.   Constitutional:       General: She is not in acute distress.     Appearance: Normal appearance. She is obese. She is not ill-appearing or toxic-appearing.   HENT:      Head: Normocephalic.      Nose: No rhinorrhea.   Cardiovascular:      Rate and Rhythm: Normal rate and regular " "rhythm.      Heart sounds: Normal heart sounds.   Pulmonary:      Effort: Pulmonary effort is normal. No respiratory distress.      Breath sounds: Normal breath sounds. No stridor. No wheezing, rhonchi or rales.   Abdominal:      General: Abdomen is flat.   Musculoskeletal:         General: Normal range of motion.      Right lower leg: No edema.      Left lower leg: No edema.   Skin:     General: Skin is warm and dry.      Nails: There is no clubbing.   Neurological:      General: No focal deficit present.      Mental Status: She is alert and oriented to person, place, and time.   Psychiatric:         Mood and Affect: Mood normal.         Behavior: Behavior normal.         Judgment: Judgment normal.         RESULTS/DATA     Pulmonary Function Test Results     No PFT on record    Chest Radiograph   CXR  5/13/24: IMPRESSION: No acute cardiopulmonary process.  7/4/19: IMPRESSION: Slightly enlarged cardiac silhouette size with no definite focal consolidation, pleural effusion, edema or pneumothorax.    Chest CT Scan     No results found for this or any previous visit from the past 365 days.      Echocardiogram & Cardiac Studies     No results found for this or any previous visit from the past 365 days.       Labwork & Pathology   Complete Blood Count  Lab Results   Component Value Date    WBC 7.8 05/13/2024    HGB 13.3 05/13/2024    HCT 41.1 05/13/2024    MCV 93 05/13/2024     05/13/2024       Peripheral Eosinophil Count:   Eosinophils Absolute (x10*3/uL)   Date Value   05/13/2024 0.04       Serum Immunoglobulin E:    No results found for: \"IGE\"     Metabolic Parameters  Sodium   Date/Time Value Ref Range Status   05/13/2024 03:52  136 - 145 mmol/L Final     Potassium   Date/Time Value Ref Range Status   05/13/2024 03:52 PM 4.3 3.5 - 5.3 mmol/L Final     Comment:     MILD HEMOLYSIS DETECTED. The result may be falsely elevated due to hemolysis or other interferents. Clinical correlation is recommended. " Repeat testing may be considered.     Chloride   Date/Time Value Ref Range Status   05/13/2024 03:52  98 - 107 mmol/L Final     Bicarbonate   Date/Time Value Ref Range Status   05/13/2024 03:52 PM 28 21 - 32 mmol/L Final     Anion Gap   Date/Time Value Ref Range Status   05/13/2024 03:52 PM 15 10 - 20 mmol/L Final     Urea Nitrogen   Date/Time Value Ref Range Status   05/13/2024 03:52 PM 11 6 - 23 mg/dL Final     Creatinine   Date/Time Value Ref Range Status   05/13/2024 03:52 PM 0.88 0.50 - 1.05 mg/dL Final     Glucose   Date/Time Value Ref Range Status   05/13/2024 03:52 PM 86 74 - 99 mg/dL Final     Calcium   Date/Time Value Ref Range Status   05/13/2024 03:52 PM 10.2 8.6 - 10.3 mg/dL Final     Zinc, Serum or Plasma   Date/Time Value Ref Range Status   05/13/2024 03:52 PM 82.1 60.0 - 120.0 ug/dL Final     Comment:     INTERPRETIVE INFORMATION: Zinc, Serum or Plasma    Elevated results may be due to skin or collection-related   contamination, including the use of a noncertified metal-free   collection/transport tube. If contamination concerns exist due to   elevated levels of serum/plasma zinc, confirmation with a second   specimen collected in a certified metal-free tube is recommended.    Circulating zinc concentrations are dependent on albumin status   and are depressed with malnutrition.  Zinc may also be lowered   with infection, inflammation, stress, oral contraceptives, and   pregnancy.  Zinc may be elevated with zinc supplementation or   fasting.  Elevated zinc concentrations may interfere with copper   absorption.     This test was developed and its performance characteristics   determined by [facility]. It has not been cleared or   approved by the [place] Food and Drug Administration. This test was   performed in a CLIA certified laboratory and is intended for   clinical purposes.  Performed By: [facility]  [address] [place] [id number]  : Preet Gamble,  MD, PhD  IA Number: [id number]     AST   Date/Time Value Ref Range Status   05/13/2024 03:52 PM 16 9 - 39 U/L Final     Comment:     MILD HEMOLYSIS DETECTED. The result may be falsely elevated due to hemolysis or other interferents. Clinical correlation is recommended. Repeat testing may be considered.     ALT   Date/Time Value Ref Range Status   05/13/2024 03:52 PM 11 7 - 45 U/L Final     Comment:     Patients treated with Sulfasalazine may generate falsely decreased results for ALT.       Bronchoscopy & Pathology/Cultures       ASSESSMENT/PLAN     Ms. Felix is a 38 y.o. female; was referred to the [facility] for evaluation of bariatric surgery clearance.    Problem List and Orders  Diagnoses and all orders for this visit:  Bariatric surgery status  -     Referral to Pulmonology  Encounter for preoperative pulmonary examination      Assessment and Plan / Recommendations:  Bariatric surgery status: looking to get gastric sleeve procedure with Dr. Hicks.  -Ordering full PFT test to establish lung function baseline (due to her smoking hx)  -If stable, can provide clearance  -Asymptomatic from a pulmonary standpoint    2. Former Smoker: never cigarette smoker. She did smoke marijuana daily for 11 years; quit 3/2024.     RTC after testing completion

## 2024-06-04 ENCOUNTER — OFFICE VISIT (OUTPATIENT)
Dept: PRIMARY CARE | Facility: CLINIC | Age: 39
End: 2024-06-04
Payer: COMMERCIAL

## 2024-06-04 ENCOUNTER — OFFICE VISIT (OUTPATIENT)
Dept: PULMONOLOGY | Facility: CLINIC | Age: 39
End: 2024-06-04
Payer: COMMERCIAL

## 2024-06-04 VITALS
SYSTOLIC BLOOD PRESSURE: 117 MMHG | WEIGHT: 267.7 LBS | OXYGEN SATURATION: 99 % | HEART RATE: 60 BPM | TEMPERATURE: 98.1 F | HEIGHT: 69 IN | DIASTOLIC BLOOD PRESSURE: 81 MMHG | BODY MASS INDEX: 39.65 KG/M2

## 2024-06-04 VITALS
WEIGHT: 265 LBS | DIASTOLIC BLOOD PRESSURE: 80 MMHG | OXYGEN SATURATION: 98 % | BODY MASS INDEX: 39.25 KG/M2 | HEIGHT: 69 IN | SYSTOLIC BLOOD PRESSURE: 119 MMHG | HEART RATE: 68 BPM

## 2024-06-04 DIAGNOSIS — Z87.891 FORMER SMOKER: ICD-10-CM

## 2024-06-04 DIAGNOSIS — Z11.59 NEED FOR HEPATITIS B SCREENING TEST: ICD-10-CM

## 2024-06-04 DIAGNOSIS — Z98.84 BARIATRIC SURGERY STATUS: Primary | ICD-10-CM

## 2024-06-04 DIAGNOSIS — Z11.59 NEED FOR HEPATITIS C SCREENING TEST: ICD-10-CM

## 2024-06-04 DIAGNOSIS — Z00.00 ENCOUNTER FOR ANNUAL PHYSICAL EXAM: Primary | ICD-10-CM

## 2024-06-04 DIAGNOSIS — Z01.811 ENCOUNTER FOR PREOPERATIVE PULMONARY EXAMINATION: ICD-10-CM

## 2024-06-04 PROBLEM — D64.9 ANEMIA: Status: ACTIVE | Noted: 2024-06-04

## 2024-06-04 PROBLEM — F12.90 MARIJUANA USE: Status: RESOLVED | Noted: 2024-06-04 | Resolved: 2024-06-04

## 2024-06-04 PROBLEM — F12.90 MARIJUANA USE: Status: ACTIVE | Noted: 2024-06-04

## 2024-06-04 PROBLEM — R06.83 SNORING: Status: ACTIVE | Noted: 2024-06-04

## 2024-06-04 PROBLEM — N39.3 STRESS INCONTINENCE OF URINE: Status: ACTIVE | Noted: 2024-06-04

## 2024-06-04 PROBLEM — L30.9 ECZEMA: Status: ACTIVE | Noted: 2024-06-04

## 2024-06-04 PROBLEM — E66.812 OBESITY, CLASS II, BMI 35-39.9: Status: ACTIVE | Noted: 2023-10-18

## 2024-06-04 PROBLEM — E66.9 OBESITY, CLASS II, BMI 35-39.9: Status: ACTIVE | Noted: 2023-10-18

## 2024-06-04 PROCEDURE — 3008F BODY MASS INDEX DOCD: CPT | Performed by: STUDENT IN AN ORGANIZED HEALTH CARE EDUCATION/TRAINING PROGRAM

## 2024-06-04 PROCEDURE — 3008F BODY MASS INDEX DOCD: CPT | Performed by: NURSE PRACTITIONER

## 2024-06-04 PROCEDURE — 99395 PREV VISIT EST AGE 18-39: CPT | Performed by: STUDENT IN AN ORGANIZED HEALTH CARE EDUCATION/TRAINING PROGRAM

## 2024-06-04 PROCEDURE — 99214 OFFICE O/P EST MOD 30 MIN: CPT | Performed by: NURSE PRACTITIONER

## 2024-06-04 PROCEDURE — 1036F TOBACCO NON-USER: CPT | Performed by: NURSE PRACTITIONER

## 2024-06-04 PROCEDURE — 99204 OFFICE O/P NEW MOD 45 MIN: CPT | Performed by: NURSE PRACTITIONER

## 2024-06-04 RX ORDER — GLUCOSAM/CHONDRO/HERB 149/HYAL 750-100 MG
1 TABLET ORAL DAILY
COMMUNITY

## 2024-06-04 ASSESSMENT — ENCOUNTER SYMPTOMS
VOMITING: 0
CHILLS: 0
BACK PAIN: 0
NERVOUS/ANXIOUS: 1
APPETITE CHANGE: 0
NAUSEA: 0
DIZZINESS: 0
SLEEP DISTURBANCE: 1
RHINORRHEA: 0
STRIDOR: 0
HEADACHES: 0
PALPITATIONS: 0
SORE THROAT: 0
WHEEZING: 0
TROUBLE SWALLOWING: 0
ACTIVITY CHANGE: 0
FEVER: 0
EYE ITCHING: 0
AGITATION: 0
TREMORS: 0
CHEST TIGHTNESS: 0
BRUISES/BLEEDS EASILY: 0
ROS GI COMMENTS: DENIES ACID REFLUX
COUGH: 0
VOICE CHANGE: 0
ABDOMINAL PAIN: 0
DIARRHEA: 0
MYALGIAS: 0
ARTHRALGIAS: 1
JOINT SWELLING: 0
UNEXPECTED WEIGHT CHANGE: 0
SINUS PRESSURE: 0
WEAKNESS: 0
SINUS PAIN: 0
EYE REDNESS: 0
SHORTNESS OF BREATH: 0
FATIGUE: 0

## 2024-06-04 ASSESSMENT — PAIN SCALES - GENERAL: PAINLEVEL: 0-NO PAIN

## 2024-06-04 ASSESSMENT — PATIENT HEALTH QUESTIONNAIRE - PHQ9
SUM OF ALL RESPONSES TO PHQ9 QUESTIONS 1 AND 2: 0
2. FEELING DOWN, DEPRESSED OR HOPELESS: NOT AT ALL
1. LITTLE INTEREST OR PLEASURE IN DOING THINGS: NOT AT ALL

## 2024-06-04 NOTE — PATIENT INSTRUCTIONS
Today we discussed your pulmonary history and need for bariatric surgery clearance.  We will need baseline set of lung function testing first given your prior smoking history.    -Ordering pulmonary testing  -We can do a virtual once this is completed to review results and I can provide clearance as long as everything is stable    Thank you for visiting the pulmonary clinic today! It was a pleasure to participate in your care.  Please return to clinic after testing completion or sooner if needed. (Can be a virtual appointment)    Vincent Johnson, CNP  My Office Number: [phone]   CT Scheduling: [phone]  PFT/Follow Up Visit Scheduling: [phone]  My Nurse: JORDAN Del Angel  My : Pamela    To reach the nurse, Mer Olguin RN, please call [phone]. Mer has a secure voice mail account if you want to leave a message.    **For immediate needs such as medication issues/refills, active sick symptoms/medical concerns; I ask that you please call the office and speak to the pulmonary nurse. MyChart messages do not come directly to me. There can sometimes be a delay before I am aware of any messages that were sent. Thank you.**

## 2024-06-04 NOTE — PATIENT INSTRUCTIONS
Dear Ms. Felix,    It was a pleasure getting to manage your care with you today.     Follow up Appointment: 1 year for annual physical    Emergency  In the case of an emergency please call 911 or visit the Emergency Department immediately for evaluation.     We look forward to continuing your care here at our Clinic. Take Care.     Sincerely,   Karl Alvarez MD

## 2024-06-04 NOTE — PROGRESS NOTES
Subjective   Patient ID: Antionette Felix is a 38 y.o. female with PMH of eczema who presents for Establish Care (Needs paper work done ).    # Health Maintenance  - Last prior HM: 3+ years  - Patient's rating of their own health: Good  - Dental Care: last prior dental visit - 1-2 months ago but needs a deep cleaning // brushes teeth: regularly // flosses teeth - occasionally // denies current tooth pain  - Vision: last prior ophtho visit: >1 year // corrective devices: glasses // recent vision: no concerns  - Hearing: denies recent hearing loss  - Diet: has been working with bariatric program with healthy diet  - Exercise: [employer] and goes on walks with daughter; does a mix of cardio and weights  - Weight: working on weight loss with bariatrics  - Smoking: no history of tobacco use  - EtOH: Alcohol Use: Yes, patient drinks alcohol. Frequency: 2 times a month. Amount: 1-2 glasses.  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 No    WOMEN  - Menstrual Status: Irregular menses, just spotting, since IUD  - LMP - no regular periods  - Pregnancy history:   - Sexual History: Sexually active with 1 male partner in the last 6 months  - Prior forms of contraception tried: IUD, unsure the kind  - Content with current contraception? Yes  - Sexual dysfunction? No  - Bladder dysfunction? Has rare episodes of stress incontinence  - Cervical CA: last prior pap 2022 // h/o abnormal pap? Yes, describe: ASCUS with negative HPV in 2018  - Breast CA: maternal aunt has a history of breast cancer    PHQ2:  Over the past 2 weeks, how often have you been bothered by any of the following problems?  Little interest or pleasure in doing things: Not at all  Feeling down, depressed, or hopeless: Not at all    Food insecurity:  Food Insecurity: Not on File (11/10/2023)    Received from [facility]   "   Food Insecurity     Food: 0     Review of Systems  As above in HPI    Objective   /81 (BP Location: Right arm, Patient Position: Sitting, BP Cuff Size: Adult)   Pulse 60   Temp 36.7 °C (98.1 °F) (Oral)   Ht 1.753 m (5' 9\")   Wt 121 kg (267 lb 11.2 oz)   LMP  (LMP Unknown)   SpO2 99%   BMI 39.53 kg/m²  Body mass index is 39.53 kg/m².    Physical Exam  Vitals reviewed.   Constitutional:       General: She is not in acute distress.     Appearance: Normal appearance. She is obese.   HENT:      Head: Normocephalic and atraumatic.   Eyes:      Conjunctiva/sclera: Conjunctivae normal.   Cardiovascular:      Rate and Rhythm: Normal rate and regular rhythm.      Pulses: Normal pulses.      Heart sounds: No murmur heard.     No friction rub. No gallop.   Pulmonary:      Effort: Pulmonary effort is normal. No respiratory distress.      Breath sounds: No wheezing, rhonchi or rales.   Musculoskeletal:      Right lower leg: No edema.      Left lower leg: No edema.   Skin:     General: Skin is warm and dry.   Neurological:      Mental Status: She is alert. Mental status is at baseline.   Psychiatric:         Behavior: Behavior normal.       Assessment/Plan   Antionette Felix is a 39yo female with a history of eczema who presents to reestablish care and for annual physical. Patient appears to be an excellent candidate for bariatric surgery.    Problem List Items Addressed This Visit             ICD-10-CM    Encounter for annual physical exam Z00.00     - Flu vaccine: recommended annually  - COVID vaccine: recommended completion of primary series and recommended boosters  - Tdap: next due 2029  - HBV: unknown status; titers ordered to be done with next labs  - HPV (<45):  completed  - STI screen: not indicated based on risk factors  - Lifetime HIV, syph, HepC: syphilis and HIV negative in 2019; HCV ordered  - Lipid Panel (35M,45F): completed in May and WNL  - DM screening: completed in May and WNL  - HTN screening: wnl " today  - Food Insecurity screen: negative  - Depression: PHQ-2 negative  - Tobacco Cessation: N/A  - Last Dental: recommended follow up every 6mo  - Last Eye exam: recommended follow up annually  - Pap smear (21-65): negative w/ cotesting in 2022; repeat in 2027          Other Visit Diagnoses         Codes    Need for hepatitis B screening test    -  Primary Z11.59    Relevant Orders    Hepatitis B core antibody, total    Hepatitis B surface antibody    Hepatitis B surface antigen    Need for hepatitis C screening test     Z11.59    Relevant Orders    Hepatitis C Antibody        Follow up in 1 year for annual physical.    Patient discussed with Dr. Gallo.    Karl Alvarez MD   PGY-3

## 2024-06-05 PROBLEM — Z00.00 ENCOUNTER FOR ANNUAL PHYSICAL EXAM: Status: ACTIVE | Noted: 2024-06-05

## 2024-06-05 NOTE — ASSESSMENT & PLAN NOTE
- Flu vaccine: recommended annually  - COVID vaccine: recommended completion of primary series and recommended boosters  - Tdap: next due 2029  - HBV: unknown status; titers ordered to be done with next labs  - HPV (<45):  completed  - STI screen: not indicated based on risk factors  - Lifetime HIV, syph, HepC: syphilis and HIV negative in 2019; HCV ordered  - Lipid Panel (35M,45F): completed in May and WNL  - DM screening: completed in May and WNL  - HTN screening: wnl today  - Food Insecurity screen: negative  - Depression: PHQ-2 negative  - Tobacco Cessation: N/A  - Last Dental: recommended follow up every 6mo  - Last Eye exam: recommended follow up annually  - Pap smear (21-65): negative w/ cotesting in 2022; repeat in 2027

## 2024-06-06 NOTE — PROGRESS NOTES
I reviewed the resident's documentation and discussed the patient with the resident. I agree with the resident's medical decision making as documented in the note.    Oswaldo Gallo MD

## 2024-06-10 ENCOUNTER — APPOINTMENT (OUTPATIENT)
Dept: PULMONOLOGY | Facility: CLINIC | Age: 39
End: 2024-06-10
Payer: COMMERCIAL

## 2024-06-10 ENCOUNTER — OFFICE VISIT (OUTPATIENT)
Dept: SLEEP MEDICINE | Facility: CLINIC | Age: 39
End: 2024-06-10
Payer: COMMERCIAL

## 2024-06-10 VITALS
TEMPERATURE: 97.3 F | HEIGHT: 69 IN | HEART RATE: 79 BPM | DIASTOLIC BLOOD PRESSURE: 68 MMHG | WEIGHT: 272.2 LBS | BODY MASS INDEX: 40.32 KG/M2 | SYSTOLIC BLOOD PRESSURE: 112 MMHG

## 2024-06-10 DIAGNOSIS — R06.83 SNORING: ICD-10-CM

## 2024-06-10 DIAGNOSIS — Z98.84 BARIATRIC SURGERY STATUS: ICD-10-CM

## 2024-06-10 DIAGNOSIS — E66.01 MORBID OBESITY (MULTI): ICD-10-CM

## 2024-06-10 DIAGNOSIS — Z01.818 PREOPERATIVE CLEARANCE: ICD-10-CM

## 2024-06-10 DIAGNOSIS — G47.30 SLEEP APNEA, UNSPECIFIED TYPE: Primary | ICD-10-CM

## 2024-06-10 PROCEDURE — 99204 OFFICE O/P NEW MOD 45 MIN: CPT | Performed by: STUDENT IN AN ORGANIZED HEALTH CARE EDUCATION/TRAINING PROGRAM

## 2024-06-10 PROCEDURE — 1036F TOBACCO NON-USER: CPT | Performed by: STUDENT IN AN ORGANIZED HEALTH CARE EDUCATION/TRAINING PROGRAM

## 2024-06-10 PROCEDURE — 3008F BODY MASS INDEX DOCD: CPT | Performed by: STUDENT IN AN ORGANIZED HEALTH CARE EDUCATION/TRAINING PROGRAM

## 2024-06-10 ASSESSMENT — ENCOUNTER SYMPTOMS
CARDIOVASCULAR NEGATIVE: 1
NEUROLOGICAL NEGATIVE: 1
PSYCHIATRIC NEGATIVE: 1
RESPIRATORY NEGATIVE: 1
CONSTITUTIONAL NEGATIVE: 1

## 2024-06-10 ASSESSMENT — SLEEP AND FATIGUE QUESTIONNAIRES
HOW LIKELY ARE YOU TO NOD OFF OR FALL ASLEEP WHILE SITTING AND TALKING TO SOMEONE: WOULD NEVER DOZE
WORRIED_DISTRESSED_DUE_TO_SLEEP: NOT AT ALL NOTICEABLE
HOW LIKELY ARE YOU TO NOD OFF OR FALL ASLEEP WHILE SITTING AND READING: WOULD NEVER DOZE
SATISFACTION_WITH_CURRENT_SLEEP_PATTERN: VERY SATISFIED
SLEEP_PROBLEM_INTERFERES_DAILY_ACTIVITIES: NOT AT ALL NOTICEABLE
SLEEP_PROBLEM_NOTICEABLE_TO_OTHERS: A LITTLE
HOW LIKELY ARE YOU TO NOD OFF OR FALL ASLEEP WHEN YOU ARE A PASSENGER IN A CAR FOR AN HOUR WITHOUT A BREAK: WOULD NEVER DOZE
HOW LIKELY ARE YOU TO NOD OFF OR FALL ASLEEP IN A CAR, WHILE STOPPED FOR A FEW MINUTES IN TRAFFIC: WOULD NEVER DOZE
SITING INACTIVE IN A PUBLIC PLACE LIKE A CLASS ROOM OR A MOVIE THEATER: WOULD NEVER DOZE
HOW LIKELY ARE YOU TO NOD OFF OR FALL ASLEEP WHILE SITTING QUIETLY AFTER LUNCH WITHOUT ALCOHOL: WOULD NEVER DOZE
HOW LIKELY ARE YOU TO NOD OFF OR FALL ASLEEP WHILE WATCHING TV: WOULD NEVER DOZE
ESS-CHAD TOTAL SCORE: 1
HOW LIKELY ARE YOU TO NOD OFF OR FALL ASLEEP WHILE LYING DOWN TO REST IN THE AFTERNOON WHEN CIRCUMSTANCES PERMIT: SLIGHT CHANCE OF DOZING

## 2024-06-10 NOTE — ASSESSMENT & PLAN NOTE
- we will start work-up with In-center sleep testing (PSG)  - lengthy discussion on CELY and sleep study education as well as the tips to be successful with the sleep study  - patient voiced understanding

## 2024-06-10 NOTE — PATIENT INSTRUCTIONS
[facility] Sleep Medicine  [address]  [facility] [place]  [address] [facility] [address]       NAME: Antionette Felix   DATE: 06/10/24    Your Sleep Provider Today: Ti Sprague MD  Your Primary Care Physician: Mac Samson MD   Your Referring Provider: Pavan Hicks MD    DIAGNOSIS:   1. Sleep apnea, unspecified type  Home sleep apnea test (HSAT)      2. Bariatric surgery status  Referral to Adult Sleep Medicine      3. Preoperative clearance  Referral to Adult Sleep Medicine      4. Morbid obesity (Multi)  Referral to Adult Sleep Medicine          Thank you for coming to the Sleep Medicine Clinic today! Your sleep medicine provider today was: Ti Sprague MD Below is a summary of your treatment plan, other important information, and our contact numbers:      TREATMENT PLAN     - Get your sleep study scheduled  - If not already done, sign up for 'My Chart' and send prescription requests or messages through this      Scheduling a Sleep Study    Call the  Sleep Testing Center to speak with a sleep testing  to book your overnight sleep study procedure at one of our adult and pediatric-friendly sleep labs. Overnight sleep studies may be scheduled on a weekday or weekend.    We have child life services on a case by case basis at the [facility] location. We also perform daytime testing for shift workers on a case by case basis.    Locations for sleep studies are: [facility], [facility] ([facility]), [facility], [facility], [facility], [place], [place].    Bring your usual medications and nightly routine items for your sleep study. In order to fall asleep faster in sleep lab, we advise patients to wake up earlier on the morning of the scheduled testing and avoid napping prior to testing. Sometimes, your provider may prescribe a sleep aid to be taken at lights out in the sleep lab. If you are taking a sleep aid, please have somebody pick you  up after the sleep testing.    Results of your sleep study will be given to the ordering clinician. Please contact their office for results or follow up as directed by your clinician.    For additional information about the sleep medicine services, please call [phone]    IMPORTANT INFORMATION     Call 911 for medical emergencies.  Our offices are generally open from Monday-Friday, 9 am - 5 pm.  If you need to get in touch with me, you may either call me and my team(number is below) or you can use [employer].  If a referral for a test, for CPAP, or for another specialist was made, and you have not heard about scheduling this within a week, please call scheduling at [phone]).  If you are unable to make your appointment for clinic or an overnight study, kindly call the office at least 48 hours in advance to cancel and reschedule.  If you are on CPAP, please bring your device's card or the device to each clinic appointment.   There are no supporting services by either the sleep doctors or their staff on weekends and Holidays, or after 5 PM on weekdays.   If you have been asked to come to a sleep study, make sure you bring toiletries, a comfy pillow, and any nighttime medications that you may regularly take. Also be sure to eat dinner before you arrive. We generally do not provide meals.      PRESCRIPTIONS     We require 7 days advanced notice for prescription refills. If we do not receive the request in this time, we cannot guarantee that your medication will be refilled in time.      IMPORTANT PHONE NUMBERS     Sleep Medicine Clinic Fax: [phone]  Appointments (for Adult Sleep Clinic): [phone] - option 2  Appointments (For Sleep Studies): [phone] - option 3  Behavioral Sleep Medicine: [phone]  Sleep Surgery: [phone]  ENT (Otolaryngology): [phone]  Headache Clinic (Neurology): [phone]  Neurology: [phone]  Psychiatry: [phone]  Pulmonary Function  Testing (PFT) [place]: [phone]  Pulmonary Medicine: [phone]  [employer] (DME): [phone]  [employer] (DME): [phone]  [facility] ([facility] [phone]      OUR ADULT SLEEP MEDICINE TEAM   Please do not hesitate to call the office or sleep nurse with any questions between appointments:    Adult Sleep Nurses (Jayne Telles, RN and Leona Araujo RN):  For clinical questions and refilling prescriptions: [phone]  Email sleep diaries and other documents at: [email or web address]    Adult Sleep Medicine Secretaries:  Anuradha Shah (For Ismael/Hicks/Krdenia/Brent/Celestine/Juan Diego):   P: [phone]  F: [phone]  Chanda Fleming (For Pradhan/Kendall): P: [phone]  Fax: [phone]  Pamela Antoine (For [place]/Blank): P: [phone]  F: [phone]  Haley Schmitt (For [place]): P: [phone]  F: [phone]  Radha Walker (For Helga/Steve/Zakhary): P: [phone]  F: [phone]  Estrellita Gold (For Thaddeus/Pancho): P: [phone]  F: [phone]     Adult Sleep Medicine Advanced Practice Providers:  Po Redding ([place], [place])  Fiordaliza Wilson ([facility])  Sharon Argueta CNP ([place], [place], [place])  Jeannie Aguayo CNP ([place], [place], [place])  La Garrido ([place], [place], [place])  Rony Deleon CNP ([facility])        OUR SLEEP TESTING LOCATIONS     Our team will contact you to schedule your sleep study, however, you can contact us as follow:  Main Phone Line (scheduling only): [phone]), option 3  Adult and Pediatric Locations  [facility] (6 years and older): [facility] - 4th floor ([address] [place] [address], [facility]) After hours line: [phone]  [facility] (Main campus: All ages): [facility], 6th floor. After hours line: [phone]   [place] (5 years and older; younger considered on case-by-case basis): [address];  "[address], [address]. Scheduling  After hours line: [phone]   [place] (6 years and older): [address]; [address]; [address]   [place] (6 years and older): [address] [facility], [address]  After hours line: [phone]   [place] (13 years and older) in [place]: [address], 2nd floor  After hours line: [phone]   [place] (13 year and older): [address]  After hours line: [phone]     Adult Only Locations:   [place] (18 years and older): [address] [facility], 2nd floor   [place] (18 years and older): [address] [facility]; 4th floor  After hours line: [phone]   [place] (18 years and older) at [place]: [address] [place] [address]  After hours line: [phone]          CONTACTING YOUR SLEEP MEDICINE PROVIDER     Send a message directly to your provider through \"My Chart\", which is the email service through your  Records Account: [email or web address]   Call [phone] and leave a message. One of the administrative assistants will forward the message to your sleep medicine provider through \"My Chart\" and/or email.     Your sleep medicine provider for this visit was: Ti Sprague MD'  "

## 2024-06-10 NOTE — PROGRESS NOTES
Patient: Antionette Felix    [id number]  : 1985 -- AGE 38 y.o.    Provider: Ti Sprague MD     Location [facility]   Service Date: 6/10/2024              [facility] Sleep Medicine Clinic  New Visit Note      HISTORY OF PRESENT ILLNESS     The patient's referring provider is: Pavan Hicks MD; Mac Samson MD    HISTORY OF PRESENT ILLNESS   Antionette Felix is a 38 y.o. female with h/o Obesity who presents to a [facility] Sleep Medicine Clinic for a sleep medicine evaluation with concerns of New Patient Visit.     Past Sleep History  Patient has the following sleep-related diagnoses: none. Prior sleep study results: not done    Current History    On today's visit, the patient reports that she has very little OSAS symptoms except some minor snoring.  She is here for her pre-op evaluation for bariatric weight loss surgery    Sleep schedule:      Weekdays / Work Days Weekends / Days Off   Bedtime 8 pm  same as weekdays   Sleep latency 5 min same as weekdays   Wake time 6 am  8 AM   Total sleep time  Average/day:  Total sleep time 9-10 hours/day Same as weekdays   Naps No   Nocturnal awakenings Yes, about 1 times a night     Preferred sleeping position: SLEEP POSITION: sidelying    Sleep-related ROS:    Sleep Initiation: no problems going to sleep  Problems going to sleep associated with: No problems going to sleep    Sleep Maintenance: wakes up about 1 times per night and easily returns to sleep after awakening  Problems staying asleep associated with: Problems Staying Asleep: nocturia    Breathing during sleep: snoring    RLS screen:  RLSSCREEN: - Sensations: Patient does not have unusual sensations in their extremities that cause an urge to move them     Daytime Symptoms  On awakening patient reports: no morning symptoms    Patient reports DAYTIME SYMPTOMS: no daytime symptoms  Patient denies daytime symptoms including: Denies: excessive daytime sleepiness  Fatigue: denies  feeling fatigue    ESS: 1  LEONA: 1  FOSQ: 40      REVIEW OF SYSTEMS     REVIEW OF SYSTEMS  Review of Systems   Constitutional: Negative.    HENT: Negative.     Respiratory: Negative.     Cardiovascular: Negative.    Genitourinary: Negative.    Skin: Negative.    Neurological: Negative.    Psychiatric/Behavioral: Negative.       SLEEP ROS: snoring      ALLERGIES AND MEDICATIONS     ALLERGIES  No Known Allergies    MEDICATIONS  Current Outpatient Medications   Medication Sig Dispense Refill    ascorbic acid/collagen hydr (COLLAGEN SKIN RENEWAL ORAL) Take by mouth. 1 Gummy daily      cetirizine (ZyrTEC) 10 mg tablet Take 1 tablet (10 mg) by mouth once daily.      multivitamin tablet Take 1 tablet by mouth once daily.      omega 3-dha-epa-fish oil (Fish OiL) 1,000 mg (120 mg-180 mg) capsule Take 1 capsule (1,000 mg) by mouth once daily.      omeprazole (PriLOSEC) 40 mg DR capsule Take 1 capsule (40 mg) by mouth once daily in the morning. Take before meals. Please remove capsule and use granules and mix with sugar free pudding or jello 30 capsule 2     No current facility-administered medications for this visit.         PAST HISTORY     PAST MEDICAL HISTORY  She  has a past medical history of Bariatric surgery status, Class 2 obesity with body mass index (BMI) of 38.0 to 38.9 in adult (2024), Encounter for pre-operative cardiovascular clearance (2023), Marijuana use (2024), Personal history of diseases of the skin and subcutaneous tissue (2021), Stress incontinence, and Suspected sleep apnea.    PAST SURGICAL HISTORY:  Past Surgical History:   Procedure Laterality Date     SECTION, CLASSIC      OTHER SURGICAL HISTORY  2020     section       FAMILY HISTORY  Family History   Problem Relation Name Age of Onset    Diabetes Mother         She does not have a family history of sleep disorder.    SOCIAL HISTORY  She  reports that she has never smoked. She has never used smokeless  "tobacco. She reports that she does not currently use alcohol. She reports that she does not currently use drugs after having used the following drugs: Marijuana.   Caffeine consumption: No  Alcohol consumption: Yes, rarely (occasional)  Smoking: No  Marijuana: No      PHYSICAL EXAM     VITAL SIGNS: /68 (BP Location: Right arm, Patient Position: Sitting, BP Cuff Size: Adult)   Pulse 79   Temp 36.3 °C (97.3 °F) (Temporal)   Ht 1.753 m (5' 9\")   Wt 123 kg (272 lb 3.2 oz)   LMP  (LMP Unknown)   BMI 40.20 kg/m²      CURRENT WEIGHT:   Vitals:    06/10/24 0949   Weight: 123 kg (272 lb 3.2 oz)     Body mass index is 40.2 kg/m².  PREVIOUS WEIGHTS:  Wt Readings from Last 3 Encounters:   06/10/24 123 kg (272 lb 3.2 oz)   06/04/24 121 kg (267 lb 11.2 oz)   06/04/24 120 kg (265 lb)       Physical Exam  Vitals reviewed.   Constitutional:       General: She is not in acute distress.     Appearance: Normal appearance. She is well-developed and normal weight.   HENT:      Head: Normocephalic and atraumatic.      Nose: Nose normal. No congestion or rhinorrhea.      Mouth/Throat:      Mouth: Mucous membranes are moist.      Pharynx: Oropharynx is clear. No oropharyngeal exudate.   Eyes:      General: No scleral icterus.     Extraocular Movements: Extraocular movements intact.      Conjunctiva/sclera: Conjunctivae normal.      Pupils: Pupils are equal, round, and reactive to light.   Neck:      Thyroid: No thyroid mass or thyroid tenderness.      Vascular: No JVD.   Cardiovascular:      Rate and Rhythm: Normal rate.      Pulses: Normal pulses.   Pulmonary:      Effort: Pulmonary effort is normal. No respiratory distress.   Musculoskeletal:      Cervical back: Normal range of motion and neck supple. No rigidity or tenderness.   Lymphadenopathy:      Cervical: No cervical adenopathy.   Neurological:      Mental Status: She is alert and oriented to person, place, and time.   Psychiatric:         Mood and Affect: Mood normal.   "       Behavior: Behavior normal.         Thought Content: Thought content normal.       PHYSICAL EXAM: MODIFIED MALLAMPATI SCORE: II (hard and soft palate, upper portion of tonsils anduvula visible)  TONGUE SCALLOPING: with scalloping      RESULTS/DATA     Bicarbonate (mmol/L)   Date Value   05/13/2024 28   07/03/2019 23   07/02/2019 20 (L)     Iron (ug/dL)   Date Value   05/13/2024 109     % Saturation (%)   Date Value   05/13/2024 33     TIBC (ug/dL)   Date Value   05/13/2024 332     Ferritin (ng/mL)   Date Value   05/13/2024 222 (H)             ASSESSMENT/PLAN     Ms. Felix is a 38 y.o. female and She was referred to the [facility] for evaluation of possible sleep disordered breathing    Problem List, Orders, Assessment, Recommendations:  Problem List Items Addressed This Visit             ICD-10-CM    BMI 40.0-44.9, adult (Multi) Z68.41     BMI Readings from Last 1 Encounters:   06/10/24 40.20 kg/m²     - encouraged healthy weight loss via diet and exercise  - continue to follow-up with bariatric team           Bariatric surgery status Z98.84    Snoring R06.83    Sleep apnea - Primary G47.30     - we will start work-up with In-center sleep testing (PSG)  - lengthy discussion on CELY and sleep study education as well as the tips to be successful with the sleep study  - patient voiced understanding           Relevant Orders    In-Center Sleep Study     Other Visit Diagnoses         Codes    Preoperative clearance     Z01.818    Morbid obesity (Multi)     E66.01            Disposition    Will call with sleep study results and determine F/U plan

## 2024-06-10 NOTE — ASSESSMENT & PLAN NOTE
BMI Readings from Last 1 Encounters:   06/10/24 40.20 kg/m²     - encouraged healthy weight loss via diet and exercise  - continue to follow-up with bariatric team

## 2024-06-12 ENCOUNTER — HOSPITAL ENCOUNTER (OUTPATIENT)
Dept: RESPIRATORY THERAPY | Facility: HOSPITAL | Age: 39
Discharge: HOME | End: 2024-06-12
Payer: COMMERCIAL

## 2024-06-12 DIAGNOSIS — Z98.84 BARIATRIC SURGERY STATUS: ICD-10-CM

## 2024-06-12 LAB
ELECTRONICALLY SIGNED BY: NORMAL
H. PYLORI DRUG SUSCEPTIBILITY RESULTS: NORMAL

## 2024-06-12 PROCEDURE — 94729 DIFFUSING CAPACITY: CPT | Performed by: INTERNAL MEDICINE

## 2024-06-12 PROCEDURE — 94060 EVALUATION OF WHEEZING: CPT | Performed by: INTERNAL MEDICINE

## 2024-06-12 PROCEDURE — 94726 PLETHYSMOGRAPHY LUNG VOLUMES: CPT | Performed by: INTERNAL MEDICINE

## 2024-06-12 PROCEDURE — 94726 PLETHYSMOGRAPHY LUNG VOLUMES: CPT

## 2024-06-13 LAB
MGC ASCENT PFT - FEV1 - POST: 3.22
MGC ASCENT PFT - FEV1 - PRE: 3.14
MGC ASCENT PFT - FEV1 - PREDICTED: 3.5
MGC ASCENT PFT - FVC - POST: 3.55
MGC ASCENT PFT - FVC - PRE: 3.53
MGC ASCENT PFT - FVC - PREDICTED: 4.25

## 2024-06-14 ENCOUNTER — CLINICAL SUPPORT (OUTPATIENT)
Dept: SLEEP MEDICINE | Facility: CLINIC | Age: 39
End: 2024-06-14
Payer: COMMERCIAL

## 2024-06-14 DIAGNOSIS — G47.30 SLEEP APNEA, UNSPECIFIED TYPE: ICD-10-CM

## 2024-06-14 DIAGNOSIS — G47.33 OBSTRUCTIVE SLEEP APNEA (ADULT) (PEDIATRIC): ICD-10-CM

## 2024-06-14 PROCEDURE — 95810 POLYSOM 6/> YRS 4/> PARAM: CPT | Performed by: STUDENT IN AN ORGANIZED HEALTH CARE EDUCATION/TRAINING PROGRAM

## 2024-06-15 VITALS
DIASTOLIC BLOOD PRESSURE: 76 MMHG | SYSTOLIC BLOOD PRESSURE: 117 MMHG | RESPIRATION RATE: 18 BRPM | HEIGHT: 69 IN | WEIGHT: 264.11 LBS | BODY MASS INDEX: 39.12 KG/M2 | OXYGEN SATURATION: 97 % | HEART RATE: 62 BPM

## 2024-06-15 ASSESSMENT — SLEEP AND FATIGUE QUESTIONNAIRES
HOW LIKELY ARE YOU TO NOD OFF OR FALL ASLEEP WHILE SITTING AND READING: WOULD NEVER DOZE
HOW LIKELY ARE YOU TO NOD OFF OR FALL ASLEEP WHILE WATCHING TV: SLIGHT CHANCE OF DOZING
HOW LIKELY ARE YOU TO NOD OFF OR FALL ASLEEP WHEN YOU ARE A PASSENGER IN A CAR FOR AN HOUR WITHOUT A BREAK: WOULD NEVER DOZE
HOW LIKELY ARE YOU TO NOD OFF OR FALL ASLEEP WHILE SITTING QUIETLY AFTER LUNCH WITHOUT ALCOHOL: WOULD NEVER DOZE
HOW LIKELY ARE YOU TO NOD OFF OR FALL ASLEEP WHILE SITTING AND TALKING TO SOMEONE: WOULD NEVER DOZE
HOW LIKELY ARE YOU TO NOD OFF OR FALL ASLEEP WHILE LYING DOWN TO REST IN THE AFTERNOON WHEN CIRCUMSTANCES PERMIT: SLIGHT CHANCE OF DOZING
SITING INACTIVE IN A PUBLIC PLACE LIKE A CLASS ROOM OR A MOVIE THEATER: WOULD NEVER DOZE
ESS-CHAD TOTAL SCORE: 2
HOW LIKELY ARE YOU TO NOD OFF OR FALL ASLEEP IN A CAR, WHILE STOPPED FOR A FEW MINUTES IN TRAFFIC: WOULD NEVER DOZE

## 2024-06-15 NOTE — PROGRESS NOTES
[facility] TECH NOTE:     Patient: Antionette Felix   MRN//AGE: [id number]  1985  38 y.o.   Technologist: Mercy Nagy   Room: 1   Service Date: 6/15/2024        Sleep Testing Location: [place]  Neck:39.5 cm  [place]: 2    TECHNOLOGIST SLEEP STUDY PROCEDURE NOTE:   This sleep study is being conducted according to the policies and procedures outlined by the AAS accreditation standards.  The sleep study procedure and processes involved during this appointment was explained to the patient/patient’s family, questions were answered. The patient/family verbalized understanding.      The patient is a 38 y.o. female scheduled for a Split Night study.    The study that was ultimately completed was a Diagnostic PSG.    The full study was completed.  Patient questionnaires completed?: yes     Consents signed? yes    Initial Fall Risk Screening:     Antionette has not fallen in the last 6 months. Antionette does not have a fear of falling. She does not need assistance with sitting, standing, or walking. She does not need assistance walking in her home. She does not need assistance in an unfamiliar setting. The patient is not using an assistive device.     Brief Study observations: Split criteria was not met.        After the procedure, the patient/family was informed to ensure followup with ordering clinician for testing results.      Technologist: Mercy Nagy

## 2024-06-18 ENCOUNTER — PATIENT MESSAGE (OUTPATIENT)
Dept: PRIMARY CARE | Facility: CLINIC | Age: 39
End: 2024-06-18
Payer: COMMERCIAL

## 2024-06-18 DIAGNOSIS — K59.09 OTHER CONSTIPATION: Primary | ICD-10-CM

## 2024-06-19 RX ORDER — POLYETHYLENE GLYCOL 3350 17 G/17G
17 POWDER, FOR SOLUTION ORAL DAILY
Qty: 30 EACH | Refills: 3 | Status: SHIPPED | OUTPATIENT
Start: 2024-06-19 | End: 2024-10-17

## 2024-06-19 RX ORDER — AMOXICILLIN 250 MG
1 CAPSULE ORAL 2 TIMES DAILY
Qty: 60 TABLET | Refills: 2 | Status: SHIPPED | OUTPATIENT
Start: 2024-06-19 | End: 2024-09-17

## 2024-06-21 ENCOUNTER — DOCUMENTATION (OUTPATIENT)
Dept: PHARMACY | Facility: HOSPITAL | Age: 39
End: 2024-06-21
Payer: COMMERCIAL

## 2024-06-21 NOTE — Clinical Note
Ryan Contreras. Please see attached note regarding this patient's H Pylori results and recommendations for treatment. Thank you!

## 2024-06-21 NOTE — PROGRESS NOTES
It has been identified that this patient may not have been treated appropriately based on their  H. Pylori Drug Susceptibility Genotypic Assay. Please see information and recommendations below:       Results:    Lab Results   Component Value Date    HPYLRESULT  05/23/2024     H. pylori DNA is Detected.    RESISTANCE ASSOCIATED VARIANTS DETECTED:  None      INTERPRETATION AND POTENTIAL THERAPEUTIC REGIMENS:  Considering the results of the mutation analysis, the following therapeutic regimens should be considered:    -Bismuth quadruple therapy: bismuth + tetracycline + metronidazole + PPI for 14 days  -Clarithromycin triple therapy: clarithromycin + amoxicillin (or metronidazole) + PPI for 14 days  -Clarithromycin concomitant therapy: clarithromycin + amoxicillin + metronidazole + PPI for 14 days    Regimen Dosing Guide based on regimens in the  Adult H. pylori Treatment Guidelines (NOTE: This list includes all H. pylori regimens; use only the dosing corresponding to the above specified regimens.)  -Bismuth quadruple therapy* = Bismuth 300-525 mg QID + Tetracycline 500 mg QID + Metronidazole 500 mg QID + PPI BID x 14 days  *Available co-formulated as Pylera (bismuth subcitrate 140 mg, metronidazole 125 mg, tetracycline 125 mg per capsule dosed as 3 capsules four times daily; twice daily PPI must be administered separately) x 10 days  -Clarithromycin triple therapy** = Clarithromycin 500 mg BID + Amoxicillin 1 g BID (or Metronidazole 500 mg BID) + PPI BID x 14 days  **Available co-packaged as Prevpac (1 tablet clarithromycin 500 mg, 2 capsules amoxicillin 500 mg, and 1 capsule lansoprazole 30 mg dosed twice daily)    -Clarithromycin concomitant therapy = Clarithromycin 500 mg BID + Amoxicillin 1g BID + Metronidazole 500 mg BID + PPI BID x 14 days  -High dose dual therapy = Amoxicillin 1 g TID + double-dose PPI BID x 14 days  -Levofloxacin quadruple therapy = Bismuth 300-525 mg QID + Levofloxacin 500 mg once daily +  Amoxicillin 1 g BID (or Metronidazole 500 mg BID) + PPI BID x 10-14 days  -Levofloxacin triple therapy = Levofloxacin 500 mg once daily + Amoxicillin 1 g BID + PPI BID x 10-14 days  -Rifabutin triple therapy*^* = Rifabutin 300 mg once daily + Amoxicillin 1 g BID  + PPI BID x 10 days  *^*Available co-formulated as Talicia (rifabutin 12.5 mg, amoxicillin 250 mg, omeprazole 10 mg per capsule dosed as 4 capsules three times daily)    Proton Pump Inhibitor Options  All regimens dose proton pump inhibitors twice daily. High dose dual therapy uses double the standard PPI dose administered twice daily. Individual PPIs may have varying insurance coverage.  -Lansoprazole 30 mg  -Omeprazole 20 mg  -Esomeprazole 20 mg  -Pantoprazole 40 mg  -Rabeprazole 20 mg  -Dexlansoprazole 30 mg    Notes:  1.Treatment recommendations are guided by mutations detected in hotspot regions known to confer resistance to specific antimicrobial agents, including 23S (clarithromycin), 16S (tetracycline), and gyrA (levofloxacin) and additionally take into consideration the 2021 AGA Clinical Practice Update on Management of Refractory H. pylori [1,2]. Individual patient characteristics such as known prior failed regimens, antibiotic allergies and severe intolerances, risks of fluoroquinolones, treatment adherence, and insurance coverage may further influence choice of regimen. Vonoprazan-containing regimens are not included due to absence of guidelines or consensus statements and limited clinical experience but may be considered in individual cases.  2.Consider referral to allergy for patients with history of penicillin allergy if amoxicillin is included in a preferred regimen. Refer to Penicillin Allergy Assessment Guidelines for further information.  3.Doxycycline is not considered equivalent to tetracycline in bismuth-based quadruple therapy.  4.Amoxicillin and metronidazole are considered equivalent in clarithromycin triple therapy and levofloxacin  quadruple therapy regimens.  5.Consider shorter 10-day course or avoid levofloxacin-containing regimens in patients at high risk for fluoroquinolone toxicity (e.g., prior C. difficile infection, CNS abnormalities, known QT prolongation, etc.).  6.Regimens including rifabutin are generally considered not preferred due to higher risk of toxicity. Potential toxicities include orange discoloration of body fluids (common), rash, flu-like syndrome, uveitis, and cytopenias.      DISCLAIMER:  Reference range for RESULT is 'H. pylori DNA is Not Detected.' Reference range for VARIANTS DETECTED is 'none'.  This assay is designed to qualitatively detect targeted clinically-relevant nucleotide variants in 16S, 23S, and gyrA genes of Helicobacter pylori. Extracted nucleic acid is amplified by PCR, followed by next-generation sequencing and alignment and variant calling using genome [id number] [3]. A negative result does not rule out the presence of bacteria template below the limit of detection of this assay.  This assay has been used to identify mutations associated with treatment failure in a retrospective cohort [3-4].  Decisions on patient care and treatment must be based on independent medical judgment of the treating physician, taking into account all applicable information concerning the patient's condition such as clinical and histopathologic findings, other laboratory findings, and patient preferences. This report includes information from public sources, including scientific and medical literature to better characterize the significance of alterations detected.  This laboratory developed test was developed and its analytical performance characteristics have been determined by [facility]. This test has not been cleared or approved by the FDA; however, the FDA has determined that such approval is not necessary. The [facility] is certified under the Clinical Laboratory Improvement Amendments of 1988 (CLIA-88)  as qualified to perform high complexity testing.    REFERENCES:  1.Clementine WD et al. [facility] Clinical Guideline: Treatment of Helicobacter pylori Infection. Am J Gastroenterol. 112(2):212-239, 2017.  2.Lj SC et al. [facility] Clinical Practice Update on the management of refractory Helicobacter pylori infection: Expert Review. Gastroenterology. 160(5):1831-41, 2021.  3.Veronica ANG et al. Helicobacter pylori Mutations Detected by Next-Generation Sequencing in Formalin-Fixed, Paraffin-Embedded Gastric Biopsy Specimens Are Associated with Treatment Failure. J Clin Microbiol. 57(7):1834-18, 2019.  4.Alyssa HIDALGO et al. Tailored Treatment Based on Helicobacter pylori Genetic Markers of Resistance Is Associated With Higher Eradication Success. Am J Gastroenterol. 118(2):360-363, 2023           Patient Treated With:  NA; no prescription history found   Recommendation:  Recommend initiation of   -Bismuth quadruple therapy: bismuth + tetracycline + metronidazole + PPI for 14 days or  -Clarithromycin triple therapy: clarithromycin + amoxicillin (or metronidazole) + PPI for 14 days or  -Clarithromycin concomitant therapy: clarithromycin + amoxicillin + metronidazole + PPI for 14 days    Information has been routed to ordering provider.     Nida Munoz, PharmD

## 2024-06-27 ENCOUNTER — APPOINTMENT (OUTPATIENT)
Dept: SURGERY | Facility: CLINIC | Age: 39
End: 2024-06-27
Payer: COMMERCIAL

## 2024-06-27 VITALS — WEIGHT: 262 LBS | HEIGHT: 69 IN | BODY MASS INDEX: 38.8 KG/M2

## 2024-06-27 NOTE — PROGRESS NOTES
"PREOPERATIVE, MULTIDISCIPLINARY, MEDICALLY SUPERVISED, REDUCED CALORIE DIET, BEHAVIOR MODIFICATION AND EXERCISE PROGRAM       S:  The patient has been working on practicing pre-op behaviors.  Trying to focus meals on lean protieon and produce with a goal of 1 starch serving or less per meal.  Snacking on fresh fruit.       Vitals:    06/27/24 0941   Weight: 119 kg (262 lb)    Ht:  1.753 m (5' 9\")   BMI: Body mass index is 38.69 kg/m².    Goal: 5% body weight loss over the course of program    Dietary recommendation:   1. Continue to drink at least 64 ounces of water and calorie free, non-carbonated, and decaffeinated beverages  2. Practice the 30-30-30 rule by drinking between meals.  3. Structure your meal plan - have 3 meals and 1 snack daily.  4. Have balanced meals that always contain a good source of protein.  5. Increase intake of non-starchy vegetables.  Have 5 servings fruits and vegetables daily.   6. Continue to take a multivitamin daily.  7. Increase physical activity by 10-15 minutes to an end goal of 60 minutes 5 x per week.    Group Topic: Dining Out  Behavioral recommendation: Pt is encouraged to practice making healthy choices and controling portion sizes while dining out.    A/P: Pt appears to have a good understanding of how to choose menu options at restaurants that are appropriate for the weight loss surgery patient.  Pt has a goal to access nutritional information on menu items before making selections, make healthy choices based off cooking style when nutrition information is not available, avoid consuming excess hidden calories in restaurant meals and beverages, and control portion sizes while dining out.    Exercise: Walking 30 min. 4-5x/wk    Working on completing medical clearances and will follow-up in 1 month.    Aicha Jarvis, MARCELINO, LD  "

## 2024-07-01 ENCOUNTER — TELEPHONE (OUTPATIENT)
Dept: SLEEP MEDICINE | Facility: HOSPITAL | Age: 39
End: 2024-07-01
Payer: COMMERCIAL

## 2024-07-01 DIAGNOSIS — G47.33 OSA (OBSTRUCTIVE SLEEP APNEA): Primary | ICD-10-CM

## 2024-07-01 NOTE — TELEPHONE ENCOUNTER
Called patient regarding the sleep study result. Patient agreeable to start APAP therapy with [facility]. Patient scheduled for a 6- week follow-up appointment with Dr. Sprague. Patient verbalized understanding, all questions answered.

## 2024-07-02 ENCOUNTER — APPOINTMENT (OUTPATIENT)
Dept: CARDIOLOGY | Facility: HOSPITAL | Age: 39
End: 2024-07-02
Payer: COMMERCIAL

## 2024-07-18 ENCOUNTER — OFFICE VISIT (OUTPATIENT)
Dept: CARDIOLOGY | Facility: HOSPITAL | Age: 39
End: 2024-07-18
Payer: COMMERCIAL

## 2024-07-18 VITALS
DIASTOLIC BLOOD PRESSURE: 74 MMHG | WEIGHT: 262 LBS | OXYGEN SATURATION: 97 % | HEIGHT: 69 IN | BODY MASS INDEX: 38.8 KG/M2 | SYSTOLIC BLOOD PRESSURE: 111 MMHG | HEART RATE: 60 BPM

## 2024-07-18 DIAGNOSIS — Z01.818 PRE-OPERATIVE CLEARANCE: Primary | ICD-10-CM

## 2024-07-18 DIAGNOSIS — Z98.84 BARIATRIC SURGERY STATUS: ICD-10-CM

## 2024-07-18 DIAGNOSIS — E66.01 MORBID OBESITY (MULTI): ICD-10-CM

## 2024-07-18 DIAGNOSIS — Z01.818 PREOPERATIVE CLEARANCE: ICD-10-CM

## 2024-07-18 PROCEDURE — 99204 OFFICE O/P NEW MOD 45 MIN: CPT | Performed by: INTERNAL MEDICINE

## 2024-07-18 PROCEDURE — 3008F BODY MASS INDEX DOCD: CPT | Performed by: INTERNAL MEDICINE

## 2024-07-18 PROCEDURE — 1036F TOBACCO NON-USER: CPT | Performed by: INTERNAL MEDICINE

## 2024-07-18 PROCEDURE — 99214 OFFICE O/P EST MOD 30 MIN: CPT | Performed by: INTERNAL MEDICINE

## 2024-07-18 ASSESSMENT — ENCOUNTER SYMPTOMS
OCCASIONAL FEELINGS OF UNSTEADINESS: 0
DEPRESSION: 0
LOSS OF SENSATION IN FEET: 0

## 2024-07-18 ASSESSMENT — PAIN SCALES - GENERAL: PAINLEVEL: 0-NO PAIN

## 2024-07-18 NOTE — PATIENT INSTRUCTIONS
Thank you for attending the Cardiology clinic at [facility] today. It was nice to meet you.     Your cardiovascular examination was within normal limits. Your ECG showed a normal heart rhythm.    Continue your current medications.     You do not require additional cardiac testing ahead of your planned surgery.

## 2024-07-18 NOTE — PROGRESS NOTES
"Shane Felix is a 38 y.o. female presents for pre-operative cardiology assessment ahead of planned bariatric surgery (gastric sleeve) on a background of obesity, moderate CELY, GERD.     Investigations:  EKG (2024) - SR, lateral T-wave flattening.     Chief Complaint:  No chief complaint on file.    HPI  Attends gym 3x/week  - over the last few months.  - resistance and aerobic training.    - walks 2 miles without stopping   - 5 flights of stairs without stopping.    No chest pain.   No ankle swelling.   No nocturnal dyspnea, orthopnea.    No palpitations. No blackouts.     CV risk:  HbA1c 5.5%   mg/dL  Cr 0.88  Fam hx - father had heart attack -  74.  Non-smoker.    Shx - works full time [employer]. 4yo child.     ROS  A 10-system review was performed and was unremarkable apart from what is presented in the HPI.     Objective   Physical Exam  Alert and orientated.   Appropriate responses, normal affect.  No respiratory distress at rest.   Skin warm and dry.   Normal radial pulse character and volume. Clinically SR.   Anicteric sclera, no conjunctival pallor.   No JVD or carotid bruits.  Heart sounds dual, no added heart sounds or audible murmurs.   Chest clear on auscultation.   Calves soft, non-tender.  No pedal edema.  EKG (2024) - SR, lateral T-wave flattening.     Lab Review:   Lab Results   Component Value Date     2024    K 4.3 2024     2024    CO2 28 2024    BUN 11 2024    CREATININE 0.88 2024    GLUCOSE 86 2024    CALCIUM 10.2 2024     No results found for: \"CKTOTAL\", \"CKMB\", \"CKMBINDEX\", \"TROPONINI\"  Lab Results   Component Value Date    WBC 7.8 2024    HGB 13.3 2024    HCT 41.1 2024    MCV 93 2024     2024     Lab Results   Component Value Date    CHOL 203 (H) 2024    TRIG 65 2024    HDL 42.7 2024       Assessment/Plan   In summary, Antionette Felix is a 38 y.o. female " presents for pre-operative cardiology assessment ahead of planned bariatric surgery (gastric sleeve) on a background of obesity, moderate CELY, HLD, GERD. She is currently asymptomatic from cardiac viewpoint with no reported chest pain or dyspnea.  She is attending the gym 3 times a week and reports good exercise capacity being able to walk up several flights of stairs without issue.  She was euvolemic and normotensive on examination today.  Her EKG showed normal sinus rhythm.  Ms. Felix does not require additional cardiac testing ahead of her planned bariatric surgery, RCRI 1-6% 30-day risk of MACE.

## 2024-08-09 ENCOUNTER — APPOINTMENT (OUTPATIENT)
Dept: SURGERY | Facility: CLINIC | Age: 39
End: 2024-08-09
Payer: COMMERCIAL

## 2024-08-09 ENCOUNTER — DOCUMENTATION (OUTPATIENT)
Dept: SURGERY | Facility: CLINIC | Age: 39
End: 2024-08-09

## 2024-08-09 ENCOUNTER — PATIENT MESSAGE (OUTPATIENT)
Dept: SURGERY | Facility: CLINIC | Age: 39
End: 2024-08-09

## 2024-08-09 VITALS — WEIGHT: 262 LBS | BODY MASS INDEX: 38.8 KG/M2 | HEIGHT: 69 IN

## 2024-08-09 DIAGNOSIS — E66.9 OBESITY, CLASS II, BMI 35-39.9: Primary | ICD-10-CM

## 2024-08-09 PROBLEM — E66.812 OBESITY, CLASS II, BMI 35-39.9: Status: ACTIVE | Noted: 2024-08-09

## 2024-08-09 NOTE — PROGRESS NOTES
"PREOPERATIVE, MULTIDISCIPLINARY, MEDICALLY SUPERVISED, REDUCED CALORIE DIET, BEHAVIOR MODIFICATION AND EXERCISE PROGRAM    S: The patient is working on practicing pre-op behaviors.  24 hour food recall: B: 2 eggs with slice wheat toast and 1/4 avocado; L: grilled chicken salad with light ranch; s: Greek yogurt; D: air fried salmon; sweet potato and brussels sprouts    O:    Vitals:    08/09/24 0906   Weight: 119 kg (262 lb)    Ht:  1.753 m (5' 9\")   BMI: Body mass index is 38.69 kg/m².    Goal: 5% body weight loss over the course of program    Dietary recommendation:   1. Continue to drink at least 64 ounces of water and calorie free, non-carbonated, and decaffeinated beverages daily.  2. Practice the 30-30-30 rule by drinking between meals.  3. Structure your meal plan - have 3 meals and 1 snack daily.  4. Have balanced meals that always contain a good source of protein.  5. Increase intake of non-starchy vegetables.  Have 5 servings fruits and vegetables daily.   6. Continue to take a multivitamin daily.  7. Increase physical activity by 10-15 minutes to an end goal of 60 minutes 5 x per week.    Group Topic: Meal Planning    Behavioral recommendation: Pt will plan daily meals and snacks in advance, balance components to build healthy meals, and take time to listen to the body's signs of hunger and satiety.     A/P: Pt appears to have a good understanding of how to build a healthy meal schedule and how to assemble a healthy meal.  Pt has a goal to eat 3 meals and 1 snack at regularly scheduled meal times, make balanced food choices, and to listen to the body's cues to prevent sensations of being both starving and over-stuffed.    Exercise: Walking most days 30 min. and gym 3x/wk cardio 15 min. and resistance 15 min.    Hoping to complete medical clearances soon and will follow-up next month.    Aicha Jarvis, RD, LD  "

## 2024-08-12 ENCOUNTER — APPOINTMENT (OUTPATIENT)
Dept: SLEEP MEDICINE | Facility: CLINIC | Age: 39
End: 2024-08-12
Payer: COMMERCIAL

## 2024-08-13 ENCOUNTER — DOCUMENTATION (OUTPATIENT)
Dept: SURGERY | Facility: CLINIC | Age: 39
End: 2024-08-13
Payer: COMMERCIAL

## 2024-08-13 DIAGNOSIS — Z98.84 BARIATRIC SURGERY STATUS: ICD-10-CM

## 2024-08-13 DIAGNOSIS — Z01.818 PRE-OPERATIVE CLEARANCE: ICD-10-CM

## 2024-08-19 ENCOUNTER — APPOINTMENT (OUTPATIENT)
Dept: SLEEP MEDICINE | Facility: CLINIC | Age: 39
End: 2024-08-19
Payer: COMMERCIAL

## 2024-08-19 ENCOUNTER — APPOINTMENT (OUTPATIENT)
Dept: BEHAVIORAL HEALTH | Facility: CLINIC | Age: 39
End: 2024-08-19
Payer: COMMERCIAL

## 2024-08-19 ENCOUNTER — LAB (OUTPATIENT)
Dept: LAB | Facility: LAB | Age: 39
End: 2024-08-19
Payer: COMMERCIAL

## 2024-08-19 DIAGNOSIS — E66.01 MORBID OBESITY (MULTI): ICD-10-CM

## 2024-08-19 DIAGNOSIS — Z98.84 BARIATRIC SURGERY STATUS: ICD-10-CM

## 2024-08-19 DIAGNOSIS — Z01.818 PREOPERATIVE CLEARANCE: ICD-10-CM

## 2024-08-19 DIAGNOSIS — Z11.59 NEED FOR HEPATITIS B SCREENING TEST: ICD-10-CM

## 2024-08-19 DIAGNOSIS — Z11.59 NEED FOR HEPATITIS C SCREENING TEST: ICD-10-CM

## 2024-08-19 LAB
HBV CORE AB SER QL: NONREACTIVE
HBV SURFACE AB SER-ACNC: <3.1 MIU/ML
HBV SURFACE AG SERPL QL IA: NONREACTIVE
HCV AB SER QL: NONREACTIVE

## 2024-08-19 PROCEDURE — 86803 HEPATITIS C AB TEST: CPT

## 2024-08-19 PROCEDURE — 36415 COLL VENOUS BLD VENIPUNCTURE: CPT

## 2024-08-19 PROCEDURE — 86704 HEP B CORE ANTIBODY TOTAL: CPT

## 2024-08-19 PROCEDURE — 87340 HEPATITIS B SURFACE AG IA: CPT

## 2024-08-19 PROCEDURE — 83013 H PYLORI (C-13) BREATH: CPT

## 2024-08-19 PROCEDURE — 86706 HEP B SURFACE ANTIBODY: CPT

## 2024-08-20 LAB — UREA BREATH TEST QL: POSITIVE

## 2024-08-23 DIAGNOSIS — A04.8 H. PYLORI INFECTION: Primary | ICD-10-CM

## 2024-08-23 DIAGNOSIS — A04.8 BACTERIAL INFECTION DUE TO H. PYLORI: ICD-10-CM

## 2024-08-23 RX ORDER — TETRACYCLINE HYDROCHLORIDE 500 MG/1
500 CAPSULE ORAL 4 TIMES DAILY
Qty: 56 CAPSULE | Refills: 0 | Status: SHIPPED | OUTPATIENT
Start: 2024-08-23 | End: 2024-09-06

## 2024-08-23 RX ORDER — METRONIDAZOLE 500 MG/1
500 TABLET ORAL 3 TIMES DAILY
Qty: 42 TABLET | Refills: 0 | Status: SHIPPED | OUTPATIENT
Start: 2024-08-23 | End: 2024-09-06

## 2024-08-23 RX ORDER — BISMUTH SUBSALICYLATE 525 MG/30ML
524 LIQUID ORAL 4 TIMES DAILY
Status: SHIPPED | OUTPATIENT
Start: 2024-08-23 | End: 2024-09-06

## 2024-08-23 RX ORDER — OMEPRAZOLE 40 MG/1
40 CAPSULE, DELAYED RELEASE ORAL
Qty: 30 CAPSULE | Refills: 1 | Status: SHIPPED | OUTPATIENT
Start: 2024-08-23 | End: 2024-09-06

## 2024-08-23 NOTE — PROGRESS NOTES
BEHAVIORAL HEALTH ASSESSMENT FOR BARIATRIC SURGERY CANDIDACY    DATE OF EVALUATION: April 9, and April 29, 2024    TIME IN SESSION: 60 minutes each meeting; Psych Assessment 31091    *THIS IS SECOND APPOINTMENT OF TWO NECESSARY TO COMPLETE THE FULL ASSESSMENT. BELOW INFORMATION WAS COLLECTED DURING BOTH SESSIONS AND INCLUDES LETTER TO SURGEON. LETTER TO SURGEON AVAILABLE AT END OF NOTE.*    This is a synchronous virtual video session. Patient connected from home and provider from clinical space.   Televideo Informed Consent for psychological evaluation was reviewed with the patient as follows:  There are potential benefits and risks of the use of telephone or video-conferencing that differ from in-person sessions. Specifically, the telephone or televideo system we are using may not be HIPPA compliant and may present limits to patient confidentiality. Confidentiality still applies for telepsychology services, and nobody will record the session without your permission.      Understanding and verbal agreement was attested to by the patient. Patient identity was confirmed using 3 sources, including telephone number, email address and date of birth. Provision of services via telehealth was necessitated by the restrictions on face-to-face visits accompanying the COVID-19 pandemic.     Non-secure Note: The patient has consented to an nonrestricted note.    ID/REFERRAL: Antionette Felix is a 39 yo able-bodied employed Black woman who I had the pleasure of meeting with todayor a Behavioral Health Pre-Surgical Bariatric Eval, at the request of her Bariatric Program.       MOTIVATION & REASONS FOR SURGERY:  She went through the bariatric program about 3 years ago, but did not complete program due to psychosocial circumstances at the time (multiple losses, significant grief processing). Finds it difficult to lose significant amount of weight and keep it off, in context of her responsibilities. She said she used to get discouraged by  setbacks or challenges, but at this time in her life she is ready to work on weight management as a lifestyle change. She feels supported by her treatment team and her support system encouraging her. This helped her change her attitude about the effort and practice required when making lifestyle change. She feels motivated and encouraged by the changes she is practicing, feels like she is learning helpful habits. She indicated she will continue what she's learned and changed, whether surgery turns out to be a good fit for her or not.      Weight History  WHEN WEIGHT CONCERNS STARTED AND TRAJECTORY SINCE:  “I have always been heavy set, even as a kid” she gained significant weight when she was pregnant, at 290 lbs, and had trouble losing large amount and keeping weight off.    CAUSES OF WEIGHT GAIN: significant lifestyle changes that were hard to work around.    LOWEST ADULT WEIGHT: 220 lbs in her 20's   HIGHEST ADULT WEIGHT: 290 lbs when she had her daughter; lost a lot of it quickly after due to water retention and postpartum factors. Did keep off about 50 lbs. for at least a year.     HOW MUCH OF ADULT LIFE SPENT TRYING TO LOSE WEIGHT: much of adult life on weight management either losing or trying to maintain.    HISTORY OF FOOD INSECURITY?:  No   SUCCESSFUL WEIGHT LOSS ATTEMPTS: daily exercise and diet changes to include vegan choices, done with best friend which kept her accountable.    UNSUCCESSFUL WEIGHT LOSS ATTEMPTS: Cabbage diet and a few other fad diets when she was a teen and early 20's, with her mother. Lost some weight, never kept it off.    HX OF COMPENSATORY BEHAVIORS?:   N self-induced vomiting   N laxatives   N diuretics   N diet pills  N excessive exercise  N fasting >24hrs.  N other    CURRENT DIET AND EXERCISE HABITS:    TYPICAL DAY: She is trying the post-surgery liquid diet currently, eating broth, water, protein water, Ensure max Protein drink, and Jell-O. Takes vitamins and probiotics daily  as well, has been before starting the liquid diet practice. Prior to starting this, had 2-3 boiled eggs, greek yogurt, and fruit or oatmeal for breakfast; grilled chicken salad with lettuce and cheese with no dressing or 1 tbsp. of light dressing; chicken protein Lean Cuisine was dinner. Water to drink.    HOW OFTEN DO YOU EAT OUT?: rarely, has been weeks since last fast food trip   HOW LARGE ARE YOUR PORTIONS?:    EATING STYLE     GRAZING/SNACKING: in context of boredom eating, which is being addressed    EMOTIONAL EATING: denied    BOREDOM EATING: has been a hang up in the past; knows it and is actively managing by using household chores or distractions when bored instead of eating.     BINGE EATING:  Do you or did you ever eat what most people would consider an unusually large amount of food in one sitting?  No  Do you or did you ever feel like you cannot stop or don't have control over your eating?  No    NIGHT EATING:     Do you wake up during the night to eat or eat a lot after your evening meal? No. Stops after 7 pm.   EVER DIAGNOSED WITH EATING D/O?: No  WHAT CHANGES HAVE YOU MADE TO EAT HEALTHIER AND EXERCISE MORE?: stopped fast food, practicing using distraction and other tasks when bored to reduce boredom eating. Working on being supportive    WHAT REGULARLY SCHEDULED EXERCISE DO YOU DO?:  [employer] membership and going several days/week. Finds it to be a supportive environment which reinforces her exercise.     MEDICAL HISTORY   PAST MEDICAL HISTORY/HOSPITALIZATIONS/SURGERIES:  with first child. No complications at the time.       PSYCHOLOGICAL FUNCTIONING   GENERAL ISSUES    TREATMENT HISTORY: never     CURRENT MENTAL HEALTH TREATMENT/THERAPY/MEDS: N    PAST MENTAL HEALTH TREATMENT/THERAPY/MEDS: N    PSYCHIATRIC HOSPITALIZATIONS: N    CURRENT SYMPTOMS:     MOOD: calm, level  ANXIETY: can manage, finds herself getting worked up when has to adjust and make schedule changes then has  strategies for reframing thoughts and uses Calm sunday for meditation.       PTSD:      MEMORY/CONCENTRATION:     PLANNING/ORGANIZATION: uses reminders on her phone, makes lists and checks them off.      SLEEP:      TRAUMA/ABUSE HX: denied history of trauma or abuse.    SAFETY:      THOUGHT OF SUICIDE: none current/recent. Had passive thought of harm after losing her cousin, which she immediately reframed and stopped. Never plan or intent.      PAST SUICIDE ATTEMPTS: none     FAMILY HISTORY OF SUICIDE: No.       CURRENT THOUGHT OF HARM TO OTHERS?:  No      CURRENT STRESS AND COPING    HOW DO YOU COPE WITH STRESS? Uses the Calm sunday, does relaxation exercises and meditations when feeling overwhelmed. Uses rain sounds when worried or cannot shut off thoughts. Social support, limit setting, Latter day hitesh/prayer.     ANY POTENTIAL STRESSORS IN THE NEXT 6-12 MO?: No    SUBSTANCE USE HISTORY  TOBACCO/NICOTINE: No  ETOH: rare, will have a glass of wine at a social event or special occasion.   CANNABIS: previous user, edibles once a week or less. Stopped over a month ago.   OTHER DRUGS:   “In the past 12 months have you used drugs other than those required for medical reasons?”: No   PRESCRIPTION DRUG MISUSE: No  NEGATIVE CONSEQUENCES: No  TREATMENT HISTORY: No       UNDERSTANDING OF SURGICAL PROCESS    KNOWLEDGE OF PROCEDURE    WHAT PROCEDURE ARE YOU INTERESTED IN?: gastric sleeve    WHAT DO YOU KNOW ABOUT IT?: 1 hr procedure, robotic assist surgical procedure, long recovery with liquid diet needed at first. Has been practicing taking supplements regularly.   WHAT'S YOUR UNDERSTANDING OF THE PURPOSE OF BARIATRIC SURGERY?: help people build a better life by not overeating, process helps mentally prepare for surgery. “It's a lifestyle change.”   WHAT'S YOUR UNDERSTANDING OF THE RISKS/SIDE EFFECTS/COMPLICATIONS?: risk of ripping incision with not following post-surgery treatment plan, GERD, dumping syndrome.   Patient  demonstrated STRONG knowledge about the surgical procedure, risks, side effects, and complications.      KNOWLEDGE OF PRE-/POST-SURGERY DIET    WHAT'S YOUR KNOWLEDGE OF THE PRE-SURGICAL DIET?:  If required at your site: Pre (2-4 weeks of 1200 calorie/day protein shake, to shrink liver for safer surgery      WHAT'S YOUR KNOWLEDGE OF THE POST-SURGICAL DIET?:  X   Recognizes importance of gradual transition to regular bariatric diet  X   Mentions starting with small amounts of liquids/protein shakes, then progressing  X   Emphasis on protein first    Patient demonstrated STRONG knowledge about the pre- and post-surgical diet.       OTHER BEHAVIORAL CHANGES  WHAT OTHER LIFESTYLE CHANGES MIGHT YOU HAVE TO MAKE AFTER SURGERY?  X   Might have limited food tolerance  X   Chew thoroughly  Frequent exercise   X   Small portions, eating regularly  X   Take vitamins/supplements daily as recommended  X   No carbonated beverages  X   Abstain from alcohol  Other:      OUTCOME EXPECTATIONS    WHAT ARE YOUR EXPECTATIONS FOR WEIGHT LOSS: expect to lose 50 lbs in first year. Wants to lose 100 lbs total, feels like she is 100 lbs. overweight.      EXPECTATIONS FOR OUTCOME     HEALTH: improved LE pain and function “I'm top heavy” wanting to get to a point she does not need her knee brace.      FUNCTIONING: connected to health goals.      INTERPERSONAL: hoping others will change how they treat her, make less comments. “I love myself whether I'm big or small” she hopes others will  her less in professional setting or in public. Satisfied with her own social support network.       ADHERENCE   CONCERNS ABOUT ADHERING TO ABOVE CHANGES?: No   HOW OFTEN DO YOU MISS MEDICAL APPTS?:  Rare. Has system as noted.   HOW OFTEN DO YOU MISS A DOSE OF REQUIRED MEDS?: also rare. Has alarms/reminders and time of day she takes them.    ANY CHART EVIDENCE OF REPEATED NON-ADHERENCE?: None noted, from notes available to this provider.      Psychosocial  History  B/R in [facility], family all still lives close.  CURRENT LIVING SITUATION: her and her 4 year old daughter at home.    SOCIAL RELATIONSHIPS: Has a long term boyfriend. They do not live together. “Very supportive” and has made changes to his cooking to accommodate her diet changes; she cites him as a major support..Boyfriend, good friend, mother and brother live within 5 minutes of her.    SUPPORT PERSON: Boyfriend.    OTHER EMOTIONAL/INSTRUMENTAL SOCIAL SUPPORT: mother and brother. Boss had bariatric surgery and is supportive at work. Her daughter's  plans to keep her daughter for a few days overnight post-surgery.   REACTION OF SUPPORT SYSTEM TO SURGERY:  “very supportive, very focused” on helping her. She said her mother has been reminding her “this is a lifestyle change.”    HOW SURGERY MIGHT AFFECT RELATIONSHIPS: thinks it will improve, that the content of her family's humor will change, but not much else.    TRANSPORTATION: reliable vehicle   RESPONSIBILITIES: parent, work   HOW FAR DID YOU GO IN SCHOOL?: part college and completed [employer] training. Certification lapsed.  EMPLOYMENT: working with [employer] for 14 years “I'm all about positive mental health”. 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 Worked with this agency for 1 year, last agency for several years.   FINANCIAL SECURITY: stable income through work, stable housing and food security.   LEGAL PROBLEMS: none    Psychological Status: Ms Felix states that their history is negative for obsessive-compulsive disorder, eating disorders, rosalio, thought disorders, and alcohol and drug abuse.     Mental Status Exam:  Orientation:  Alert. Oriented x3.  Memory: intact.  Attention/Concentration: Normal/ Good.  Appearance:  Well-groomed. Casually Dressed. Good hygiene.   Behavior/Attitude: Cooperative. Pleasant. Good eye contact.  Motor: Relaxed. Calm. Normal motor activity.   Speech: Regular rate and volume. Fluent. No  pressure.   Mood: Euthymic  Affect: Congruent to stated mood.   Thought process: Goal-directed. Linear. Organized.  Thought content: No paranoia, delusion or ideas of reference. No hallucinations in auditory, visual or other sensory modalities.   Suicidal ideation: denied.  Homicidal ideation: denied.   Insight: Good  Judgment: Good  Fund of knowledge: Average    Plan:  Letter to Surgeon, below, made available to Bariatric Program through note and Communications.     Ms. Felix to continue with nutrition program and dietician, continue with physical activity and engaging with helpful coping strategies for reinforcement of positive experiences in her weight management efforts.         LETTER TO SURGEON:    Dear Dr. Contreras ( bariatric surgery program),  I had the pleasure of meeting with Antionette Felix at your request for a behavioral evaluation for appropriateness for bariatric surgery. She is a 38 year old, female who reports a current weight of 265 pounds and a height of 5 feet 8 inches tall.    Weight history:  Ms. Felix described herself as always heavy, even as a kid. She first noticed more significant weight gain when she had her first child; she had trouble after becoming a parent losing a large amount of weight and keeping it off. She reported her highest weight as 290 pounds when she had her daughter 4 years ago, and lowest weight as 220 pounds in her early 20's.    Psychosocial history:  Ms. 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 She's worked in the field for 14 years. Ms. Felix stated that she has a committed relationship with her boyfriend. They live separately. She lives with her 4 year old daughter.  Ms. Felix reported she grew up in [place] and described her childhood as good.  She denied any history of exposure to psychological, physical, or sexual abuse.    Behavioral issues relevant for candidacy for  bariatric surgery include:    1) Motivation: Ms. Felix reported that she is seeking bariatric surgery because she wants to focus on long term weight management and has struggled with losing and keeping significant amount of weight off on her own. She noted that she believes that she will be able to comply with the surgery procedure and post-surgical instructions, because she is motivated, has a lot of supports, wants to make healthy choices to be active for herself and her daughter, and she has exposure to the long term lifestyle changes required with bariatric surgery. She feels she is prepared for the permanent changes required.     2) History of compliance: Ms. Felix reports no history of problems with compliance with medication regimens. She has reminders and strategies to maintain compliance with appointments and post-procedure plans.     3) History of attempts to lose weight: Ms. Felix has tried and failed at more conservative approaches to weight loss, including Cabbage diet and a few other fad diets when she was a teen and early 20's, with her mother. Ms. Felix did have some success with combination of daily exercises and vegan-based diet that she practiced with her best friend.    4) Coping resources and social support: Ms. Felix feels able to cope with transient mood issues should they arise and reports significant support from both family and friends in her pursuit of bariatric surgery. She has several stress management resources she uses regularly, with benefit. She indicated her mother, brother, and boyfriend can all be instrumental supports and she is identifying options for childcare for her  daughter post-surgery, should she go through with the procedure.    5) Ability to maintain behavior post-surgery: In my opinion, Ms. Felix is well-prepared to handle the behavioral demands that are consequent to bariatric surgery. She described her understanding of medical risks with the surgery, risks  associated with non-adherence to dietary and physical activity recommendations post-surgery, and her understanding of the dietary and physical activity recommendations from the bariatric team. She described implementing the following behavioral health changes:    She described her diet yesterday as follows:  2-3 boiled eggs, greek yogurt, and fruit or oatmeal for breakfast; grilled chicken salad with lettuce and cheese with no dressing or 1 tbsp. of light dressing; chicken protein Lean Cuisine was dinner. Water to drink.    She described her current physical activity regimen as follows:  [employer] membership and going several days/week for self-guided cardio and light weight training. Finds it to be a supportive environment which reinforces her exercise.    6) Psychological contraindications to surgery: A comprehensive review of psychological symptoms reveals no contraindications to surgery. She denied any history of eating disorder symptomatology. She reported challenges with boredom eating that she is addressing with the dietician program; she actively manages by keeping a list of other activities and choosing them instead of eating when bored. Works well thus far, per patient.     IMPRESSIONS: in my clinical opinion, Ms. Felix is able to provide informed consent and is an appropriate candidate for bariatric surgery from the psychological perspective. Ms. Felix was able to spontaneously discuss the lifestyle and dietary changes that she has already incorporated in order to be able to maintain weight loss and the other changes she is working to incorporate.     Behavioral confirmation of her candidacy will come in the form of her ability to adhere to her current dietary plan. Should she fare poorly with this adherence trial, please consider recommending a follow-up visit with this office. Additionally, we had an extended discussion about behavioral responses to surgery, for which she should be vigilant. We  discussed the post-surgical risks of mood deterioration, substance misuse, the development of compulsive behaviors, and the development of maladaptive coping strategies. She expressed understanding of these risks and agreed to contact our office with appropriate haste if any of these maladaptive responses were to develop after surgery.      Thank you for the opportunity to meet with Tae Downing, PhD  Clinical Psychologist  Division of Behavioral Medicine  Department of OB/GYN  [facility]  [phone]  Office: [phone]

## 2024-08-26 DIAGNOSIS — A04.8 BACTERIAL INFECTION DUE TO H. PYLORI: Primary | ICD-10-CM

## 2024-08-26 RX ORDER — BISMUTH SUBSALICYLATE 525 MG/15ML
15 SUSPENSION ORAL 4 TIMES DAILY
Qty: 840 ML | Refills: 0 | Status: SHIPPED | OUTPATIENT
Start: 2024-08-26 | End: 2024-09-09

## 2024-09-05 ENCOUNTER — APPOINTMENT (OUTPATIENT)
Dept: SLEEP MEDICINE | Facility: HOSPITAL | Age: 39
End: 2024-09-05
Payer: COMMERCIAL

## 2024-09-05 VITALS
OXYGEN SATURATION: 99 % | HEART RATE: 68 BPM | SYSTOLIC BLOOD PRESSURE: 117 MMHG | TEMPERATURE: 97.9 F | BODY MASS INDEX: 40.46 KG/M2 | DIASTOLIC BLOOD PRESSURE: 73 MMHG | WEIGHT: 274 LBS

## 2024-09-05 DIAGNOSIS — G47.33 OSA (OBSTRUCTIVE SLEEP APNEA): Primary | ICD-10-CM

## 2024-09-05 DIAGNOSIS — R06.83 SNORING: ICD-10-CM

## 2024-09-05 PROBLEM — Z98.84 BARIATRIC SURGERY STATUS: Status: RESOLVED | Noted: 2024-06-04 | Resolved: 2024-09-05

## 2024-09-05 PROCEDURE — 1036F TOBACCO NON-USER: CPT | Performed by: STUDENT IN AN ORGANIZED HEALTH CARE EDUCATION/TRAINING PROGRAM

## 2024-09-05 PROCEDURE — 99214 OFFICE O/P EST MOD 30 MIN: CPT | Performed by: STUDENT IN AN ORGANIZED HEALTH CARE EDUCATION/TRAINING PROGRAM

## 2024-09-05 SDOH — ECONOMIC STABILITY: FOOD INSECURITY: WITHIN THE PAST 12 MONTHS, THE FOOD YOU BOUGHT JUST DIDN'T LAST AND YOU DIDN'T HAVE MONEY TO GET MORE.: NEVER TRUE

## 2024-09-05 SDOH — ECONOMIC STABILITY: FOOD INSECURITY: WITHIN THE PAST 12 MONTHS, YOU WORRIED THAT YOUR FOOD WOULD RUN OUT BEFORE YOU GOT MONEY TO BUY MORE.: NEVER TRUE

## 2024-09-05 ASSESSMENT — ENCOUNTER SYMPTOMS
CARDIOVASCULAR NEGATIVE: 1
PSYCHIATRIC NEGATIVE: 1
RESPIRATORY NEGATIVE: 1
CONSTITUTIONAL NEGATIVE: 1
NEUROLOGICAL NEGATIVE: 1

## 2024-09-05 ASSESSMENT — SLEEP AND FATIGUE QUESTIONNAIRES
SATISFACTION_WITH_CURRENT_SLEEP_PATTERN: VERY SATISFIED
SLEEP_PROBLEM_INTERFERES_DAILY_ACTIVITIES: NOT AT ALL NOTICEABLE
SLEEP_PROBLEM_NOTICEABLE_TO_OTHERS: A LITTLE
HOW LIKELY ARE YOU TO NOD OFF OR FALL ASLEEP WHILE WATCHING TV: SLIGHT CHANCE OF DOZING
WORRIED_DISTRESSED_DUE_TO_SLEEP: NOT AT ALL NOTICEABLE
ESS-CHAD TOTAL SCORE: 1
HOW LIKELY ARE YOU TO NOD OFF OR FALL ASLEEP WHILE SITTING AND READING: WOULD NEVER DOZE
HOW LIKELY ARE YOU TO NOD OFF OR FALL ASLEEP WHILE SITTING QUIETLY AFTER LUNCH WITHOUT ALCOHOL: WOULD NEVER DOZE
SITING INACTIVE IN A PUBLIC PLACE LIKE A CLASS ROOM OR A MOVIE THEATER: WOULD NEVER DOZE
HOW LIKELY ARE YOU TO NOD OFF OR FALL ASLEEP WHILE LYING DOWN TO REST IN THE AFTERNOON WHEN CIRCUMSTANCES PERMIT: WOULD NEVER DOZE
HOW LIKELY ARE YOU TO NOD OFF OR FALL ASLEEP WHILE SITTING AND TALKING TO SOMEONE: WOULD NEVER DOZE
HOW LIKELY ARE YOU TO NOD OFF OR FALL ASLEEP IN A CAR, WHILE STOPPED FOR A FEW MINUTES IN TRAFFIC: WOULD NEVER DOZE
HOW LIKELY ARE YOU TO NOD OFF OR FALL ASLEEP WHEN YOU ARE A PASSENGER IN A CAR FOR AN HOUR WITHOUT A BREAK: WOULD NEVER DOZE

## 2024-09-05 ASSESSMENT — PAIN SCALES - GENERAL: PAINLEVEL: 0-NO PAIN

## 2024-09-05 NOTE — ASSESSMENT & PLAN NOTE
- doing well with PAP therapy -> getting much better compliance and benefit  - however, still not enough to meet the pre-op PAP usage criteria.  I will check again in 2 weeks and update chart if she is compliant and meet criteria at that time  - continue current setting  - renew PAP supply orders

## 2024-09-05 NOTE — PATIENT INSTRUCTIONS
We discussed the importance of PAP therapy in the perioperative period, as well as expectations for possible changes in therapy with weight loss.   Standard CELY precautions should be taken during surgery, as outlined in the Practice Guidelines for the Perioperative Management of Patients with Obstructive Sleep Apnea: An Updated Report by the American Society of Anesthesiologists Task Force on Perioperative Management of Patients with Obstructive Sleep Apnea.  ===============================================     PreOp Clearance:     The patient is at increased but not prohibitive risk due to CELY and Obesity.  They are cleared to proceed to surgery with the following recommendations:     - CPAP use preOp and postOp (as soon as able). Told to bring CPAP machine with them to surgery.  - minimize narcotics, avoid benzodiazepines  - aggressive bronchodilators pre/postOp  - aggressive pulmonary toilet (IS, early ambulation) pre/postOp  - call Pulmonary Consult if questions perioperatively  - follow-up with me in 4-6 months after surgery

## 2024-09-05 NOTE — PROGRESS NOTES
Patient: Antionette Felix    [id number]  : 1985 -- AGE 38 y.o.    Provider: Ti Sprague MD     Location [facility]   Service Date: 2024              [facility]  Followup Visit Note    HISTORY OF PRESENT ILLNESS     HISTORY OF PRESENT ILLNESS   Antionette Felix is a 38 y.o. female with h/o CELY and Obesity who presents to a [facility] for followup.     Assessment and plan from last visit: 6/10/2024    Ms. Felix is a 38 y.o. female and She was referred to the [facility] for evaluation of possible sleep disordered breathing     Problem List, Orders, Assessment, Recommendations:  Problem List Items Addressed This Visit               ICD-10-CM     BMI 40.0-44.9, adult (Multi) Z68.41           BMI Readings from Last 1 Encounters:   06/10/24 40.20 kg/m²      - encouraged healthy weight loss via diet and exercise  - continue to follow-up with bariatric team              Bariatric surgery status Z98.84     Snoring R06.83     Sleep apnea - Primary G47.30       - we will start work-up with In-center sleep testing (PSG)  - lengthy discussion on CELY and sleep study education as well as the tips to be successful with the sleep study  - patient voiced understanding              Relevant Orders     In-Center Sleep Study      Other Visit Diagnoses           Codes     Preoperative clearance     Z01.818     Morbid obesity (Multi)     E66.01                Disposition     Will call with sleep study results and determine F/U plan    Current History    On today's visit, the patient reports that she is finally getting benefit from her CPAP.  The first two weeks was tough but she felt so much better now.  She is finding herself looking forward to sleep and using her CPAP.  She has a lot more energy now upon waking.      PAP Info  [employer] COMPANY: [employer]  Machine: THERAPY: RESMED  AIRSENSE 11  4 cm H2O - 14 cm H2O   Issues with therapy: ISSUES WITH THERAPY: None  Benefits with PAP: PERCEIVED BENEFITS OF PAP: refreshing sleep    RLS Followup:   none.    Daytime Symptoms    Patient reports DAYTIME SYMPTOMS: no daytime symptoms  Patient denies daytime symptoms including: Denies: excessive daytime sleepiness    Naps: No  Fatigue: denies feeling fatigue    ESS: 1  LEONA: 1  FOSQ: 40    REVIEW OF SYSTEMS     REVIEW OF SYSTEMS  Review of Systems   Constitutional: Negative.    HENT: Negative.     Respiratory: Negative.     Cardiovascular: Negative.    Genitourinary: Negative.    Skin: Negative.    Neurological: Negative.    Psychiatric/Behavioral: Negative.           ALLERGIES AND MEDICATIONS     ALLERGIES  No Known Allergies    MEDICATIONS: She has a current medication list which includes the following prescription(s): ascorbic acid/collagen hydr - Take by mouth. 1 Gummy daily, pink bismuth - Take 15 mL by mouth 4 times a day for 14 days. Take 15-30 minutes after taking your antibiotics for H Pylori, cetirizine - Take 1 tablet (10 mg) by mouth once daily, metronidazole - Take 1 tablet (500 mg) by mouth 3 times a day for 14 days, multivitamin - Take 1 tablet by mouth once daily, omeprazole - Take 1 capsule (40 mg) by mouth 2 times a day before meals for 14 days. Please remove capsule and use granules and mix with sugar free pudding or jello, sennosides-docusate sodium - Take 1 tablet by mouth 2 times a day, tetracycline - Take 1 capsule (500 mg) by mouth 4 times a day for 14 days, omega 3-dha-epa-fish oil - Take 1 capsule (1,000 mg) by mouth once daily, and omeprazole - Take 1 capsule (40 mg) by mouth once daily in the morning. Take before meals. Please remove capsule and use granules and mix with sugar free pudding or jello, and the following Facility-Administered Medications: bismuth subsalicylate.    PAST MEDICAL HISTORY : She  has a past medical history of Bariatric surgery status, Class 2 obesity  with body mass index (BMI) of 38.0 to 38.9 in adult (2024), Encounter for pre-operative cardiovascular clearance (2023), Marijuana use (2024), Personal history of diseases of the skin and subcutaneous tissue (2021), Stress incontinence, and Suspected sleep apnea.    PAST SURGICAL HISTORY: She  has a past surgical history that includes Other surgical history (2020) and  section, classic.     FAMILY HISTORY: No changes since previous visit. Otherwise non-contributory as charted.     SOCIAL HISTORY  She  reports that she has never smoked. She has never used smokeless tobacco. She reports that she does not currently use alcohol. She reports that she does not currently use drugs after having used the following drugs: Marijuana.       PHYSICAL EXAM     VITAL SIGNS: /73 (BP Location: Left arm, Patient Position: Sitting)   Pulse 68   Temp 36.6 °C (97.9 °F) (Temporal)   Wt 124 kg (274 lb)   SpO2 99%   BMI 40.46 kg/m²      PREVIOUS WEIGHTS:  Wt Readings from Last 3 Encounters:   24 124 kg (274 lb)   24 119 kg (262 lb)   24 119 kg (262 lb)         RESULTS/DATA     Bicarbonate (mmol/L)   Date Value   2024 28   2019 23   2019 20 (L)     Iron (ug/dL)   Date Value   2024 109     % Saturation (%)   Date Value   2024 33     TIBC (ug/dL)   Date Value   2024 332     Ferritin (ng/mL)   Date Value   2024 222 (H)       PAP Adherence  A PAP adherence download was obtained and data was reviewed personally today in clinic.        ASSESSMENT/PLAN     Ms. Felix is a 38 y.o. female and she returns in followup to the [facility] for CELY.    Problem List, Orders, Assessment, Recommendations:  Problem List Items Addressed This Visit             ICD-10-CM    BMI 40.0-44.9, adult (Multi) Z68.41     BMI Readings from Last 1 Encounters:   24 40.46 kg/m²     - encouraged healthy weight loss via diet and  exercise  - continue to follow-up with bariatric team           Snoring R06.83    CELY (obstructive sleep apnea) - Primary G47.33     - doing well with PAP therapy -> getting much better compliance and benefit  - however, still not enough to meet the pre-op PAP usage criteria.  I will check again in 2 weeks and update chart if she is compliant and meet criteria at that time  - continue current setting  - renew PAP supply orders              Disposition    RTC in 6 months post surgery

## 2024-09-05 NOTE — ASSESSMENT & PLAN NOTE
BMI Readings from Last 1 Encounters:   09/05/24 40.46 kg/m²     - encouraged healthy weight loss via diet and exercise  - continue to follow-up with bariatric team

## 2024-09-12 ENCOUNTER — APPOINTMENT (OUTPATIENT)
Dept: SURGERY | Facility: CLINIC | Age: 39
End: 2024-09-12
Payer: COMMERCIAL

## 2024-09-12 VITALS — HEIGHT: 69 IN | BODY MASS INDEX: 39.69 KG/M2 | WEIGHT: 268 LBS

## 2024-09-12 DIAGNOSIS — E66.9 OBESITY, CLASS II, BMI 35-39.9: Primary | ICD-10-CM

## 2024-09-12 NOTE — PROGRESS NOTES
"PREOPERATIVE, MULTIDISCIPLINARY, MEDICALLY SUPERVISED, REDUCED CALORIE DIET, BEHAVIOR MODIFICATION AND EXERCISE PROGRAM    S: The patient has been working on modifying her diet but has not been consistent.  24 hour food recall: 2 boiled eggs with turkey patel and Greek yogurt; L: vegan hotdog and pasta salad with vegetables; D: baked salmon, sweet potato nd mixed vegetables.    O:   Vitals:    09/12/24 0904   Weight: 122 kg (268 lb)    Ht: 1.753 m (5' 9\") BMI: Body mass index is 39.58 kg/m².    Goal: 5% body weight loss over the course of program    Dietary recommendation:   1. Eliminate high calorie, carbonated, and caffeinated beverages  2. Practice the 30-30-30 rule by drinking between meals.  3. Structure your meal plan - have 3 meals and 1 snack daily.  4. Have balanced meals that always contain a good source of protein.  5. Increase intake of non-starchy vegetables.  Have 5 servings fruits and vegetables daily.   6. Continue to take a multivitamin daily.  7. Increase physical activity by 10-15 minutes to an end goal of 60 minutes 5 x per week.    Group Topic: Vitamins & Minerals    Behavioral recommendation: Pt is encouraged to choose a variety of foods to promote a diverse nutrient profile, as well as supplementing one complete multivitamin every day to complement the profile of oral intake.    A/P: Pt appears to have a good understanding of the potential for micronutrient deficiencies and the importance of supplementing vitamins and minerals both pre- and post-weight loss surgery.   Pt has a goal to consume a balanced diet and supplement one multivitamin daily pre-op; secondary goal to prepare for post-op vitamin regimen.     Exercise: Walking dog most days 15-20 min.    Working on completing her medical clearances will follow-up in 1 month.    Aicha Jarvis, RD, LD  "

## 2024-09-21 ENCOUNTER — DOCUMENTATION (OUTPATIENT)
Dept: SLEEP MEDICINE | Facility: CLINIC | Age: 39
End: 2024-09-21
Payer: COMMERCIAL

## 2024-09-22 NOTE — PROGRESS NOTES
Patient doing well with PAP and compliant with therapy.    We discussed the importance of PAP therapy in the perioperative period, as well as expectations for possible changes in therapy with weight loss.   Standard CELY precautions should be taken during surgery, as outlined in the Practice Guidelines for the Perioperative Management of Patients with Obstructive Sleep Apnea: An Updated Report by the American Society of Anesthesiologists Task Force on Perioperative Management of Patients with Obstructive Sleep Apnea.  ===============================================     PreOp Clearance:     The patient is at increased but not prohibitive risk due to CELY and Obesity.  They are cleared to proceed to surgery with the following recommendations:     - CPAP use preOp and postOp (as soon as able). Told to bring CPAP machine with them to surgery.  - minimize narcotics, avoid benzodiazepines  - aggressive bronchodilators pre/postOp  - aggressive pulmonary toilet (IS, early ambulation) pre/postOp  - call Pulmonary Consult if questions perioperatively  - follow-up with me in 4-6 months after surgery

## 2024-09-26 ENCOUNTER — LAB (OUTPATIENT)
Dept: LAB | Facility: LAB | Age: 39
End: 2024-09-26
Payer: COMMERCIAL

## 2024-09-26 DIAGNOSIS — A04.8 BACTERIAL INFECTION DUE TO H. PYLORI: ICD-10-CM

## 2024-09-26 PROCEDURE — 83013 H PYLORI (C-13) BREATH: CPT

## 2024-09-27 LAB — UREA BREATH TEST QL: NEGATIVE

## 2024-10-11 ENCOUNTER — TELEPHONE (OUTPATIENT)
Dept: SURGERY | Facility: HOSPITAL | Age: 39
End: 2024-10-11
Payer: COMMERCIAL

## 2024-10-11 ENCOUNTER — TELEPHONE (OUTPATIENT)
Dept: SURGERY | Facility: CLINIC | Age: 39
End: 2024-10-11
Payer: COMMERCIAL

## 2024-10-11 DIAGNOSIS — Z01.818 PREOPERATIVE CLEARANCE: ICD-10-CM

## 2024-10-11 DIAGNOSIS — E66.01 MORBID OBESITY (MULTI): ICD-10-CM

## 2024-10-11 DIAGNOSIS — Z98.84 BARIATRIC SURGERY STATUS: Primary | ICD-10-CM

## 2024-10-11 NOTE — TELEPHONE ENCOUNTER
Attempted to call patient to bring them in for their pre op class and appointment with Dr. Contreras, before surgery. LVM stating above and phone number to call back.

## 2024-10-14 ENCOUNTER — APPOINTMENT (OUTPATIENT)
Dept: SURGERY | Facility: CLINIC | Age: 39
End: 2024-10-14
Payer: COMMERCIAL

## 2024-10-14 ENCOUNTER — NURSE ONLY (OUTPATIENT)
Dept: SURGERY | Facility: CLINIC | Age: 39
End: 2024-10-14

## 2024-10-14 VITALS
HEART RATE: 71 BPM | SYSTOLIC BLOOD PRESSURE: 123 MMHG | DIASTOLIC BLOOD PRESSURE: 85 MMHG | BODY MASS INDEX: 38.66 KG/M2 | WEIGHT: 261.8 LBS

## 2024-10-14 DIAGNOSIS — E66.812 OBESITY, CLASS II, BMI 35-39.9: Primary | ICD-10-CM

## 2024-10-14 DIAGNOSIS — Z98.84 BARIATRIC SURGERY STATUS: ICD-10-CM

## 2024-10-14 DIAGNOSIS — F54 PSYCHOLOGICAL FACTORS AFFECTING MORBID OBESITY (MULTI): ICD-10-CM

## 2024-10-14 DIAGNOSIS — E66.01 PSYCHOLOGICAL FACTORS AFFECTING MORBID OBESITY (MULTI): ICD-10-CM

## 2024-10-14 PROCEDURE — 99215 OFFICE O/P EST HI 40 MIN: CPT | Performed by: SURGERY

## 2024-10-14 NOTE — H&P (VIEW-ONLY)
"BARIATRIC SURGERY PREOPERATIVE VISIT    Date: 10/14/24  Time: [unfilled]    Name: Antionette Felix    MRN: [id number]    This is a 38 y.o. y.o. female with morbid obesity (Body mass index is 38.66 kg/m².) who plans to undergo Laparoscopic sleeve gastrectomy  42507 surgery. They have completed a rigorous preoperative medical work-up and bariatric surgery educational program.   Notes she likes cucumbers.  She has changed her eating and cut out fast food.  She has avoided \"cheating\" notes she does not really like the bad food anymore  Initial Weight 280  Current Weight 261.8    5/13/24   Machelle Contreras MD MPH   Physician  General Surgery     Progress Notes     Signed     Encounter Date: 5/13/2024     Signed       Expand All Collapse All     Subjective[]Expand by Default  Date: 5/13/2024 Time: 11:34 AM  Name: Antionette Felix  MRN: [id number]     -Spoke with patient 5/7/2024-     This is a 38 y.o. female with morbid obesity BMI=~40  who presents to clinic for consideration of bariatric surgery. she has attempted and failed multiple diet and exercise regimens for weight loss. Initial Onset of obesity was in childhood and after pregnancy 4 years ago .  Their goal for surgery is to  be healthier  and lose weight. The patient has tried multiple diets to lose weight including James, Exercise, Low Calorie, and Vegan, calorie counting . The patient was most successful with the  exercise and mindful eating . The most pounds lost on this diet were 50 lbs. The patient considers their dietary weakness to be  fast food, carb, portion size, emotional eater  The patient reports a  highest weight ever of 280 pounds and lowest weight ever of 220 pounds Distribution of Obesity: is general. Current diet:  liquid diet with small solid foods . Compliance: Strict Adherence Diet Problems: Insufficient Vegetables and Low in Fiber } Dietary Details Include:Dietary Details: 3 Servings of Fruit/Day, 2-3 Servings of Vegetables/Day, 2-3 Servings of Meat/Day, 0 " Servings of Whole Grains/Day, 1 Servings of Simple Carbs/Day, 64 Ounces of Water/Day , 2 Servings of Dairy/Day, 0 Cups of Coffee/Day, 1 Cups of Tea/Day, 0 Cans of Regular Soda/Day, 0 Cans of Diet Soda/Day, and mindful eating  The patient exercises 3 times /week  3 Hours/Week Types of Exercise : treadmill, weight training     She is trying a liquid diet and follows a bariatric group on FB  She started the process last year but had stopped because of family members passing away and social stress so now that she feels better she's back on track. She is very excited and enthusiastic about the process this time.  No EGD or UGI.   No reflux/heartburn.      Procedure and Risk Discussed:   Gastric Sleeve: We discussed the risks and benefits of the gastric sleeve at length. The patient understands the risks and benefits of the procedure and how the procedure is performed. The patient understands the risks include but are not limited to bleeding, infection, DVT, PE, pneumonia, myocardial infarction, leak along the staple lines, and weight regain.      36 yo female with bmi of 41 and comorbidities of fatigue and stress incontinence. All can be helped with weight loss.. We discussed she should not smoke anything for the rest of her life. We discussed lifestyle changes at length. We discussed going with the sleeve. She is agreeable to stopping her marijuana use. Counseled on exercise. All Qeustions were answered.    Last smoke was 3-4 months ago. She only ever used tobacco, always marijuana.  No medical card.         Comorbidities: weak knees (no NSAIDs)        SLEEVE Gastric Surgery For Morbid Obesity Laparoscopic Longitudinal Gastrectomy         Off PPI for never taking (how long) Denies GERD     How bad is the heartburn? 0 = No symptoms  Heartburn when lying down? 0 = No symptoms  Heartburn when standing up? 0 = No symptoms  Heartburn after meals? 0 = No symptoms  Does heartburn change your diet? 0 = No symptoms  Does  heartburn wake you from sleep? 0 = No symptoms  Do you have difficulty swallowing? 0 = No symptoms  Do you have pain with swallowing? 0 = No symptoms  If you take medication, does this affect your daily life? 0 = No symptoms  How bad is the regurgitation? 0 = No symptoms  Regurgitation when lying down? 0 = No symptoms  Regurgitation when standing up? 0 = No symptoms  Regurgitation after meals? 0 = No symptoms  Does regurgitation change your diet? 0 = No symptoms  Does regurgitation wake you from sleep? 0 = No symptoms  How satisfied are you with your present condition? Satisfied     PMH:   Medical History        Past Medical History:   Diagnosis Date    Bariatric surgery status       Bariatric surgery status    Personal history of diseases of the skin and subcutaneous tissue 2021     History of eczema            PSH:   Surgical History         Past Surgical History:   Procedure Laterality Date     SECTION, CLASSIC        OTHER SURGICAL HISTORY   2020      section               FAMILY HISTORY:  Family History          Family History   Problem Relation Name Age of Onset    Diabetes Mother                SOCIAL HISTORY:  Social History   Social History            Tobacco Use    Smoking status: Former       Types: Cigarettes    Smokeless tobacco: Never   Substance Use Topics    Alcohol use: Not Currently            MEDICATIONS:  Prior to Admission Medications:  Medication Documentation Review Audit         Reviewed by Rachel Virk MD (Physician) on 24 at 1134       Medication Order Taking? Sig Documenting Provider Last Dose Status   cetirizine (ZyrTEC) 10 mg tablet [id number] Yes Take 1 tablet (10 mg) by mouth once daily. Historical Provider, MD   Active   multivitamin tablet [id number] Yes Take 1 tablet by mouth once daily. Historical Provider, MD   Active                     She also is on some other vitamin gummies     ALLERGIES:  Allergies   No Known Allergies        REVIEW OF  "SYSTEMS:  GENERAL: Negative for malaise, significant weight loss and fever  HEAD: Negative for headache, swelling.  NECK: Negative for lumps, goiter, pain and significant neck swelling  RESPIRATORY: Negative for cough, wheezing or shortness of breath.  CARDIOVASCULAR: Negative for chest pain, leg swelling or palpitations.  GI: Negative for abdominal discomfort, blood in stools or black stools or change in bowel habits  : No history of dysuria, frequency or incontinence  MUSCULOSKELETAL: Negative for joint pain or swelling, back pain or muscle pain.  SKIN: Negative for lesions, rash, and itching.  PSYCH: Negative for sleep disturbance, mood disorder and recent psychosocial stressors.  ENDOCRINE: Negative for cold or heat intolerance, polyuria, polydipsia and goiter.           Objective  PHYSICAL EXAM:  Visit Vitals  /78   Pulse 76   Ht 1.753 m (5' 9\")   Wt 120 kg (264 lb)   BMI 38.99 kg/m²   Smoking Status Former   BSA 2.42 m²      General appearance: obese, NAD  Neuro: AOx3  Head: EOMI; no swelling or lesions of scalp or face  ENT:  no lumps or lymphadenopathy, thyroid normal to palpation; oropharynx clear, no swelling or erythema  Skin: warm, no erythema or rashes  Lungs: clear to percussion and auscultation  Heart: regular rhythm and S1, S2 normal  Abdomen: soft, non-tender, no masses, no organomegaly  Extremities: Normal exam of the extremities. No swelling or pain.  Psych: no hurried speech, no flight of ideas, normal affect     IMPRESSION:  Antionette Felix is a 38 y.o. female with a bmi of Body mass index is 38.99 kg/m². with the following diagnoses and co-morbidities: knee weakness.  All can be helped with weight loss.  She is a great candidate for Weight loss surgery, also has stress inctinence and fatigue.  She is eager for a sleeve and has already quit wmoking marijuana 4 motnhs ago.    Notes not as hungry since quit smoking marijuana.  She notes this has been a good change and has lost some weight " since she and I spoke the last time.  She is making better food choices.   We discussed the sleeve  at length and all questions were answered. risks and benefits were discussed  The patient understands the risks and benefits of the procedure and how the procedure is performed. The patient understands the risks include but are not limited to bleeding, infection, DVT, PE, pneumonia, myocardial infarction, leak along the staple lines, and weight regain. We discussed lifestyle changes necessary to be successful.         This patient does meet the criteria for a surgical weight loss procedure according to NIH guidelines.  The risks of sleeve gastrectomy, Jennifer-en-Y gastric bypass, and duodenal switch surgery including bleeding, leak, wound infection, dehydration, ulcers, internal hernia, DVT/PE, prolonged nausea/vomiting, incomplete resolution of associated medical conditions, reflux, weight regain, vitamin/mineral deficiencies, and death have been explained to the patient and Antionette Felix has expressed understanding and acceptance of them.      The increased risk of substance and alcohol abuse following bariatric surgery was discussed with the patient, along with the negative consequences of substance/alcohol use after surgery including addiction, worsening of mental health disorders, and injury to the stomach. The risk of smoking and vaping (tobacco or any other substance) after bariatric surgery was explained to the patient. This includes risk of anastamotic ulcers, gastritis, bleeding, perforation, stricture, and PO intolerance.  The patient expressed understanding and acceptance of these risks.     The benefits of the above surgeries including weight loss, improvement/resolution of associated medical and mental health conditions, improved mobility, and decreased mortality have been explained the the patient and Antionette Felix has expressed understanding and acceptance of them.        Assessment/Plan  PLAN:  The plan of  treatment for Antionette Felix is to continue with the consultations and tests ordered today in hopes of qualifying for pre-operative clearance for bariatric surgery. This includes:     Consult Nutrition for education and MSWL  Consult Psychology  Consult Physical Therapy for limited mobility  Consult cardiology  Consult pulmonology  Labs/CXR/EKG ordered  EGD  PCP for medical optimization  Consult sleep medicine - concern for CELY  Meal prep  The following are some lifestyle changes you should begin to prepare you for your sleeve surgery.   Eliminate soda and other carbonated beverages from your diet. Carbonation will not be well tolerated after surgery. Try Propel, Vitamin Water Zero, Sobe Lifewater, Crystal Light or water.    Increase fluid consumption to 64 oz daily. Do not drink within 30 minutes of eating as this will liquefy your food and make you hungry more quickly.    Exercise for 30-60 minutes daily. Brisk walking, bike riding and swimming are all examples of healthy exercise. If you are unable to exercise we recommend seated exercise.    Do not skip meals.    Take a multivitamin daily.    Lose weight. In preparation for your surgery it is important that you begin making healthier food choices now. Our dietitian will meet with you to help you select foods lower in calories and higher in nutrition. We would like you to lose at least 15-20  lbs prior to surgery.     Increase your protein intake to 60 grams per day.    Alcohol is empty calories. Please eliminate while preparing for surgery.    Plan your meals.       General Instruction: 1) Use the information we gave you today to work through your insurance requirements and medical clearances.   2) These documents need to get faxed to the program navigators so they can submit them for approval from your insurance company.   3) Obtain labs today at a  facility. We will call you with any abnormalities and corrections you need to make.   4) Continue to work with  your primary care doctor and other specialist so your other health problems are well controlled prior to your surgery.   5) Adopt the recommendations of the program dietician so you develop healthy eating patterns.   6) Work with the sleep team to get your sleep apnea treated to prevent other health problems .   7) Consider attending a support group to learn from other who have been through the process.   8) Come to the MSWL sessions.   45 minutes were spent with patient including history, physical exam, and education.               Electronically signed by Machelle Contreras MD MPH at 2024 12:26 PM         Office Visit on 2024            Revision History          Note shared with patient  PMH:   Patient Active Problem List   Diagnosis    Psychological factors affecting morbid obesity (Multi)    BMI 40.0-44.9, adult (Multi)    Anemia    Eczema    Snoring    Stress incontinence of urine    Encounter for annual physical exam    CELY (obstructive sleep apnea)    Obesity, Class II, BMI 35-39.9    Morbid obesity (Multi)       PSH:   Past Surgical History:   Procedure Laterality Date     SECTION, CLASSIC      OTHER SURGICAL HISTORY  2020     section       Social hx:   Social History     Socioeconomic History    Marital status: Single     Spouse name: Not on file    Number of children: Not on file    Years of education: Not on file    Highest education level: Not on file   Occupational History    Not on file   Tobacco Use    Smoking status: Never    Smokeless tobacco: Never   Substance and Sexual Activity    Alcohol use: Not Currently    Drug use: Not Currently     Types: Marijuana     Comment: Smoked regularly from age [age]; last used 3/2024    Sexual activity: Not on file   Other Topics Concern    Not on file   Social History Narrative    Not on file     Social Determinants of Health     Financial Resource Strain: Not on File (11/10/2023)    Received from [facility]    Financial  Resource Strain     Financial Resource Strain: 0   Food Insecurity: Not on File (2024)    Received from [facility]    Food Insecurity     Food: 0   Transportation Needs: Not on File (11/10/2023)    [facility] from [facility]    Transportation Needs     Transportation: 0   Physical Activity: Not on File (11/10/2023)    [facility]    Physical Activity     Physical Activity: 0   Stress: Not on File (11/10/2023)    [facility]    Stress     Stress: 0   Social Connections: Not on File (2024)    Received from [facility]    Social Connections     Connectedness: 0   Intimate Partner Violence: Not on file   Housing Stability: Not on File (11/10/2023)    [facility] from [facility]    Housing Stability     Housin       Initial weight: 280  Current weight: 261.8 lbs    Preop Clearances:  Cardiac: 7.18.24 You do not require additional cardiac testing ahead of your planned surgery.   Pulmonary: 6.4.24 -Asymptomatic from a pulmonary standpoint   Psych: 4.29.24 Psychological contraindications to surgery: A comprehensive review of psychological symptoms reveals no contraindications to surgery   History of Clotting Disorder: NA  Anticoagulation plan: NA  Sleep Study: 6.14.24 Based on the AASM recommended definition, the sleep study is consistent with   a diagnosis of moderate obstructive sleep apnea.   -: 83%    EGD:   Esophagogastroduodenoscopy (EGD)  Order# [id number]  Reading physicians: Pavan Hicks MD; Machelle Contreras MD MPH Ordering provider: Machelle Contreras MD MPH Study date: 24     Result Information    Status: Final result (Resulted: 2024 09:50) Provider Status: Open     Result Text    Result Text   Impression  The middle third of the esophagus, lower third of the esophagus, GE junction and Z-line appeared normal.  2 cm type I hiatal hernia  The cardia, fundus of the stomach and body of the stomach appeared normal.  Abnormal mucosa  Performed forceps biopsies in the 2nd  part of the duodenum  Polyp in the 2nd part of the duodenum        Findings  The middle third of the esophagus, lower third of the esophagus, GE junction and Z-line appeared normal.  2 cm sliding hiatal hernia (type I hiatal hernia) without Po lesions present - GE junction 36 cm from the incisors, diaphragmatic impression 38 cm from the incisors, confirmed by retroflexion. Hill grade I hiatal hernia  The cardia, fundus of the stomach and body of the stomach appeared normal.  Mild, patchy edematous mucosa in the antrum;  Abnormal mucosa in the 2nd part of the duodenum. Duodenal polyp;  Performed forceps biopsies in the 2nd part of the duodenum. Duodenal polyp  Polyp in the 2nd part of the duodenum        Recommendation    Follow up with me in clinic     Cont to lose weight   Fu pathology   Cont to fu with program requirements   Started on PPI             Indication  Bariatric surgery status, BMI 40.0-44.9, adult (Multi)     Staff  Staff Role   Machelle Contreras MD MPH Proceduralist         Medications  No administrations occurring from 0810 to 0850 on 05/23/24         Preprocedure  A history and physical has been performed, and patient medication allergies have been reviewed. The patient's tolerance of previous anesthesia has been reviewed. The risks and benefits of the procedure and the sedation options and risks were discussed with the patient. All questions were answered and informed consent obtained.     Details of the Procedure  The patient underwent moderate sedation, which was administered by the procedural nurse. The patient's blood pressure, ECG, ETCO2, heart rate, level of consciousness, oxygen and respirations were monitored throughout the procedure. The scope was introduced through the mouth and advanced to the second part of the duodenum. Retroflexion was performed in the cardia. The patient experienced no blood loss. The procedure was not difficult. The patient tolerated the procedure well. There were  no apparent adverse events.      Events      Procedure Events   Event Event Time   ENDO SCOPE IN TIME 5/23/2024  8:31 AM   ENDO SCOPE OUT TIME 5/23/2024  8:41 AM         Specimens  ID Type Source Tests Collected by Time   1 : STOMACH ANTRUM BIOPSY, RULE OUT H. PYLORI Tissue STOMACH ANTRUM BIOPSY SURGICAL PATHOLOGY EXAM Machelle Contreras MD MPH 5/23/2024 0845   2 : DUODENAL BIOPSY Tissue DUODENUM SECOND PART BIOPSY SURGICAL PATHOLOGY EXAM Machelle Contreras MD MPH 5/23/2024 0843         Procedure Location  [facility] [place]  [address] [place] [address]  [phone]     Referring Provider  Machelle Contreras MD MPH  [address]  [place] [address]     Procedure Provider  Machelle Contreras MD MPH        Result History    Esophagogastroduodenoscopy (EGD) (Order #[id number]) on 5/23/2024 - Order Result History Report    Procedure Images      Middle Third of Esophagus       Anatomical Diagram      KIRAN Video     Show Video  External Procedure Report    Open External Result Report     Intraprocedure (10/14/2024 09:25:26 to 10/14/2024 09:25:26)    5/23/2024 5/23/2024 Event Details User   07:02:14 In Preprocedure Status: Preprocedure --   07:18:37 Timeout: Preprocedure Checklist Verified by Serafin Aranda RN at 05/23/2024 0718 Serafin Aranda RN   07:38:46 Preprocedure Complete  --   08:11:16 Staff Arrived Terry Burleson RN [Circulator] Terry Burleson RN   08:11:23 Staff Arrived Soni Leavitt [Scrub Person] Terry Burleson RN   08:11:40 Staff Arrived LUMA CRISTINA [Other - Observing Patient Procedure] Terry Burleson RN   08:23 Anesthesia Start  --   08:26:09 In Room Status: Procedure --   08:28 Timeout: Huddle Verified by Terry Burleson RN at 05/23/2024 0852 Terry Burleson RN   08:28 Timeout: Preprocedure Verified by Terry Burleson RN at 05/23/2024 0853 Terry Burleson RN   08:28 Timeout: Fire Safety Verified by Terry Burleson RN at 05/23/2024 0853 Terry Burleson RN   08:28:46  Staff Arrived JASPREET MCINTYRE [Physician] Terry Burleson RN   08:29 Anesthesia Ready  --   08:39:38 Staff Arrived Machelle Kelly MD MPH [Proceduralist] Terry Burleson, RN   08:42 Timeout: Debrief Verified by Terry Burleson, RN at 05/23/2024 0853 Terry Burleson, RN   08:48 Staff Departed Machelle Kelly MD MPH [Proceduralist] Terry Burleson, RN   08:48:21 Staff Departed Terry Burleson RN [Circulator]; Soni Leavitt [Scrub Person]; LUMA CRISTINA [Other - Observing Patient Procedure]; JASPREET MCINTYRE [Physician] Terry Burleson RN   08:48:21 Out of Room  --   08:49 In Phase II Status: Phase II --   08:53 Anesthesia Stop  --   09:27 Phase II Care Complete  --   09:30:44 Out of Phase II  --   09:30:45 Procedural Charting Complete Status: Complete --       Esophagogastroduodenoscopy (EGD): Patient Communication     Add Comments   Seen     Related Specimens    Surgical Pathology Exam (Order #[id number]) - Result Report        Order-Level Documents    Endoscopy Image - Scan on 7/24/24 11:14 AM by ROSMERY KELLYNA   Endoscopy Image - Scan on 7/24/24 11:14 AM by ROBIN MACHELLE   Endoscopy Image - Scan on 7/24/24 11:14 AM by ROBIN, MACHELLE   Endoscopy Image - Scan on 7/24/24 11:14 AM by ROBIN, MACHELLE   Endoscopy Image - Scan on 7/24/24 11:14 AM by ROBIN, MACHELLE   Endoscopy Image - Scan on 7/24/24 11:14 AM by ROBIN, MACHELLE   Endoscopy Image - Scan on 7/24/24 11:14 AM by ROBIN, MACHELLE   Endoscopy Image - Scan on 7/24/24 11:14 AM by ROBIN, MACHELLE   Endoscopy Image - Scan on 7/24/24 10:59 AM by ROBIN, MACHELLE   Endoscopy Image - Scan on 7/24/24 10:57 AM by ROBIN, MACHELLE   Endoscopy Image - Scan on 7/24/24 10:57 AM by ROBIN, MACHELLE   Endoscopy Image - Scan on 7/24/24 10:57 AM by ROBIN MACHELLE   Endoscopy Image - Scan on 7/24/24 10:57 AM by ROBIN, MACHELLE   Endoscopy Image - Scan on 7/24/24 10:57 AM by MACHELLE KELLY   Endoscopy Image - Scan on 7/24/24 10:55 AM by ROBIN, MACHELLE   Endoscopy Anatomical Diagram - Scan on 5/23/24 9:50 AM by  RONALD KELLY of Endoscopy Anatomical Diagram     MEDICATIONS:  Prior to Admission Medications:    Current Outpatient Medications:     ascorbic acid/collagen hydr (COLLAGEN SKIN RENEWAL ORAL), Take by mouth. 1 Gummy daily, Disp: , Rfl:     cetirizine (ZyrTEC) 10 mg tablet, Take 1 tablet (10 mg) by mouth once daily., Disp: , Rfl:     multivitamin tablet, Take 1 tablet by mouth once daily., Disp: , Rfl:     omega 3-dha-epa-fish oil (Fish OiL) 1,000 mg (120 mg-180 mg) capsule, Take 1 capsule (1,000 mg) by mouth once daily., Disp: , Rfl:     omeprazole (PriLOSEC) 40 mg DR capsule, Take 1 capsule (40 mg) by mouth once daily in the morning. Take before meals. Please remove capsule and use granules and mix with sugar free pudding or jello, Disp: 30 capsule, Rfl: 2    omeprazole (PriLOSEC) 40 mg DR capsule, Take 1 capsule (40 mg) by mouth 2 times a day before meals for 14 days. Please remove capsule and use granules and mix with sugar free pudding or jello, Disp: 30 capsule, Rfl: 1    ALLERGIES:  No Known Allergies    REVIEW OF SYSTEMS:  GENERAL: Obese. Negative for malaise, significant weight loss and fever  NECK: Negative for lumps, goiter, pain and significant neck swelling  RESPIRATORY: Negative for cough, wheezing or shortness of breath.  CARDIOVASCULAR: Negative for chest pain, leg swelling or palpitations.  GI: Negative for abdominal discomfort, blood in stools or black stools or change in bowel habits  : No history of dysuria, frequency or incontinence  MUSCULOSKELETAL: Negative for joint pain or swelling, back pain or muscle pain.  SKIN: Negative for lesions, rash, and itching.  PSYCH: Negative for sleep disturbance, mood disorder and recent psychosocial stressors.  ENDOCRINE: Negative for cold or heat intolerance, polyuria, polydipsia and goiter.    PHYSICAL EXAM:  Visit Vitals  /85   Pulse 71       General appearance: obese  Skin: warm, no erythema or rashes  Lungs: clear to percussion and  auscultation  Heart: regular rhythm and S1, S2 normal  Abdomen: soft, non-tender, no masses, no organomegaly  Extremities: Normal exam of the extremities. No swelling or pain.    No results found for this or any previous visit (from the past 24 hour(s)).    IMPRESSION:  Antionette Felix is a 38 y.o. y.o. female with a BMI of Body mass index is 38.66 kg/m².. Antionette Felix is a 38 y.o. female with a bmi of Body mass index is 38.99 kg/m². with the following diagnoses and co-morbidities: knee weakness.  All can be helped with weight loss.  She is a great candidate for Weight loss surgery, also has stress inctinence and fatigue.  She is eager for a sleeve and has already quit wmoking marijuana 4 motnhs ago.    Notes not as hungry since quit smoking marijuana.  She notes this has been a good change and has lost some weight since she and I spoke the last time.  She is making better food choices.     With her lifestyle changes, she has lost almost 20 lbs.  She is walking more with a new dog and is more active.   She is feeling great.  She is eager to proceed.  She is a great candidate for sleeve gastrectomy.  We discussed the risks and benefits of the surgery at length and all questions were answered.   She notes the process helped her change life a lot.     They have been preoperatively evaluated and deemed to be an appropriate candidate for bariatric surgery.  Surgery Type: Laparoscopic sleeve gastrectomy  67301    All testing reviewed.  All clearances contained    The risks of Laparoscopic sleeve gastrectomy  38462 surgery including bleeding, leak, wound infection, dehydration, ulcers, internal hernia, DVT/PE, prolonged nausea/vomiting, incomplete resolution of associated medical conditions, reflux, weight regain, vitamin/mineral deficiencies, and death have been explained to the patient and Antionette Felix has expressed understanding and acceptance of them.    The benefits of the above surgery including weight loss,  improvement/resolution of associated medical and mental health conditions, improved mobility, and decreased mortality have been explained the the patient and Antionette Felix has expressed understanding and acceptance of them.    Operative and blood transfusion consent forms were signed by the patient and witnessed today.  PLAN:  You are scheduled for a sleeve  on 10/24    You will receive a phone call the day prior to surgery with your OR arrival time.    Be sure to read over your Pre-Op book for final preparation for surgery.    Make a shopping list and  your supplements prior to surgery.    You have been provided with a pain script today- TAKE THIS TODAY to your pharmacy as it may need prior authorization.    Electronic prescriptions were sent to your pharmacy. Prescriptions for Omeprazole (anacid), Enoxaparin (DVT Prophylaxis) and Ondansetron (anti-nausea) have been sent to your retail pharmacy. You will likely get these medications in the hospital.  If not you can Pick these up after surgery.    Be sure to take the omeprazole, open the capsule and sprinkle over SF applesauce and take every day for 6 months after surgery. DO NOT MISS A DOSE. This will help you to heal.    Call with any questions! [phone] giuliano Haro    1) Reread your pre op manual to be familiar with pre op and post op expectations    2) Follow the pre op diet exactly so your liver shrinks down prior to your surgery:     -DAY BEFORE SURGERY: NO SOLID FOODS! You may have up to 3 protein shakes BEFORE 3pm. After 3pm, you may only have CLEAR, non-caloric liquids w/ NO red dye.   -DAY OF SURGERY: Do not eat or drink anything unless specified by the doctor.    3) Obtain the over the counter items you need in the home prior to your surgery    4) Be certain you have an appointment with your medical doctors who will need to tweak your prescription medications after surgery as you drop weight rapidly.      5) Make sure you have follow up  appointments 2 weeks after surgery and at the 6 week emma    6) --Family Medical Leave paperwork is available through your human resources department. Please call your human resources department to obtain the paperwork. You must allow up to two weeks for paperwork to be completed. You will need to fill out a portion of the paperwork before faxing it to our office for completion. In addition, indicate what kind of leave you are requesting (intermittent or continuous) and how long you are requesting the leave extend. Please fax the paperwork to [phone]   Prescriptions for all required post-operative home medications were sent to the [facility] pharmacy today and will be delivered to the patient's bedside on the day of discharge.    Further education was provided on day of surgery instructions and what to expect from the inpatient admission after surgery.

## 2024-10-14 NOTE — PROGRESS NOTES
"BARIATRIC SURGERY PREOPERATIVE VISIT    Date: 10/14/24  Time: [unfilled]    Name: Antionette Felix    MRN: [id number]    This is a 38 y.o. y.o. female with morbid obesity (Body mass index is 38.66 kg/m².) who plans to undergo Laparoscopic sleeve gastrectomy  63367 surgery. They have completed a rigorous preoperative medical work-up and bariatric surgery educational program.   Notes she likes cucumbers.  She has changed her eating and cut out fast food.  She has avoided \"cheating\" notes she does not really like the bad food anymore  Initial Weight 280  Current Weight 261.8    5/13/24   Machelle Contreras MD MPH   Physician  General Surgery     Progress Notes     Signed     Encounter Date: 5/13/2024     Signed       Expand All Collapse All     Subjective[]Expand by Default  Date: 5/13/2024 Time: 11:34 AM  Name: Antionette Felix  MRN: [id number]     -Spoke with patient 5/7/2024-     This is a 38 y.o. female with morbid obesity BMI=~40  who presents to clinic for consideration of bariatric surgery. she has attempted and failed multiple diet and exercise regimens for weight loss. Initial Onset of obesity was in childhood and after pregnancy 4 years ago .  Their goal for surgery is to  be healthier  and lose weight. The patient has tried multiple diets to lose weight including James, Exercise, Low Calorie, and Vegan, calorie counting . The patient was most successful with the  exercise and mindful eating . The most pounds lost on this diet were 50 lbs. The patient considers their dietary weakness to be  fast food, carb, portion size, emotional eater  The patient reports a  highest weight ever of 280 pounds and lowest weight ever of 220 pounds Distribution of Obesity: is general. Current diet:  liquid diet with small solid foods . Compliance: Strict Adherence Diet Problems: Insufficient Vegetables and Low in Fiber } Dietary Details Include:Dietary Details: 3 Servings of Fruit/Day, 2-3 Servings of Vegetables/Day, 2-3 Servings of Meat/Day, 0 " Pt states obtains medication from [facility]. Needs Combivent 100 mcg regular inhaler, not combivent Respimat.   CUSTOM script given to MD for review and signature.     Pt will  when ready     Servings of Whole Grains/Day, 1 Servings of Simple Carbs/Day, 64 Ounces of Water/Day , 2 Servings of Dairy/Day, 0 Cups of Coffee/Day, 1 Cups of Tea/Day, 0 Cans of Regular Soda/Day, 0 Cans of Diet Soda/Day, and mindful eating  The patient exercises 3 times /week  3 Hours/Week Types of Exercise : treadmill, weight training     She is trying a liquid diet and follows a bariatric group on FB  She started the process last year but had stopped because of family members passing away and social stress so now that she feels better she's back on track. She is very excited and enthusiastic about the process this time.  No EGD or UGI.   No reflux/heartburn.      Procedure and Risk Discussed:   Gastric Sleeve: We discussed the risks and benefits of the gastric sleeve at length. The patient understands the risks and benefits of the procedure and how the procedure is performed. The patient understands the risks include but are not limited to bleeding, infection, DVT, PE, pneumonia, myocardial infarction, leak along the staple lines, and weight regain.      36 yo female with bmi of 41 and comorbidities of fatigue and stress incontinence. All can be helped with weight loss.. We discussed she should not smoke anything for the rest of her life. We discussed lifestyle changes at length. We discussed going with the sleeve. She is agreeable to stopping her marijuana use. Counseled on exercise. All Qeustions were answered.    Last smoke was 3-4 months ago. She only ever used tobacco, always marijuana.  No medical card.         Comorbidities: weak knees (no NSAIDs)        SLEEVE Gastric Surgery For Morbid Obesity Laparoscopic Longitudinal Gastrectomy         Off PPI for never taking (how long) Denies GERD     How bad is the heartburn? 0 = No symptoms  Heartburn when lying down? 0 = No symptoms  Heartburn when standing up? 0 = No symptoms  Heartburn after meals? 0 = No symptoms  Does heartburn change your diet? 0 = No symptoms  Does  heartburn wake you from sleep? 0 = No symptoms  Do you have difficulty swallowing? 0 = No symptoms  Do you have pain with swallowing? 0 = No symptoms  If you take medication, does this affect your daily life? 0 = No symptoms  How bad is the regurgitation? 0 = No symptoms  Regurgitation when lying down? 0 = No symptoms  Regurgitation when standing up? 0 = No symptoms  Regurgitation after meals? 0 = No symptoms  Does regurgitation change your diet? 0 = No symptoms  Does regurgitation wake you from sleep? 0 = No symptoms  How satisfied are you with your present condition? Satisfied     PMH:   Medical History        Past Medical History:   Diagnosis Date    Bariatric surgery status       Bariatric surgery status    Personal history of diseases of the skin and subcutaneous tissue 2021     History of eczema            PSH:   Surgical History         Past Surgical History:   Procedure Laterality Date     SECTION, CLASSIC        OTHER SURGICAL HISTORY   2020      section               FAMILY HISTORY:  Family History          Family History   Problem Relation Name Age of Onset    Diabetes Mother                SOCIAL HISTORY:  Social History   Social History            Tobacco Use    Smoking status: Former       Types: Cigarettes    Smokeless tobacco: Never   Substance Use Topics    Alcohol use: Not Currently            MEDICATIONS:  Prior to Admission Medications:  Medication Documentation Review Audit         Reviewed by Rachel Virk MD (Physician) on 24 at 1134       Medication Order Taking? Sig Documenting Provider Last Dose Status   cetirizine (ZyrTEC) 10 mg tablet [id number] Yes Take 1 tablet (10 mg) by mouth once daily. Historical Provider, MD   Active   multivitamin tablet [id number] Yes Take 1 tablet by mouth once daily. Historical Provider, MD   Active                     She also is on some other vitamin gummies     ALLERGIES:  Allergies   No Known Allergies        REVIEW OF  "SYSTEMS:  GENERAL: Negative for malaise, significant weight loss and fever  HEAD: Negative for headache, swelling.  NECK: Negative for lumps, goiter, pain and significant neck swelling  RESPIRATORY: Negative for cough, wheezing or shortness of breath.  CARDIOVASCULAR: Negative for chest pain, leg swelling or palpitations.  GI: Negative for abdominal discomfort, blood in stools or black stools or change in bowel habits  : No history of dysuria, frequency or incontinence  MUSCULOSKELETAL: Negative for joint pain or swelling, back pain or muscle pain.  SKIN: Negative for lesions, rash, and itching.  PSYCH: Negative for sleep disturbance, mood disorder and recent psychosocial stressors.  ENDOCRINE: Negative for cold or heat intolerance, polyuria, polydipsia and goiter.           Objective  PHYSICAL EXAM:  Visit Vitals  /78   Pulse 76   Ht 1.753 m (5' 9\")   Wt 120 kg (264 lb)   BMI 38.99 kg/m²   Smoking Status Former   BSA 2.42 m²      General appearance: obese, NAD  Neuro: AOx3  Head: EOMI; no swelling or lesions of scalp or face  ENT:  no lumps or lymphadenopathy, thyroid normal to palpation; oropharynx clear, no swelling or erythema  Skin: warm, no erythema or rashes  Lungs: clear to percussion and auscultation  Heart: regular rhythm and S1, S2 normal  Abdomen: soft, non-tender, no masses, no organomegaly  Extremities: Normal exam of the extremities. No swelling or pain.  Psych: no hurried speech, no flight of ideas, normal affect     IMPRESSION:  Antionette Felix is a 38 y.o. female with a bmi of Body mass index is 38.99 kg/m². with the following diagnoses and co-morbidities: knee weakness.  All can be helped with weight loss.  She is a great candidate for Weight loss surgery, also has stress inctinence and fatigue.  She is eager for a sleeve and has already quit wmoking marijuana 4 motnhs ago.    Notes not as hungry since quit smoking marijuana.  She notes this has been a good change and has lost some weight " since she and I spoke the last time.  She is making better food choices.   We discussed the sleeve  at length and all questions were answered. risks and benefits were discussed  The patient understands the risks and benefits of the procedure and how the procedure is performed. The patient understands the risks include but are not limited to bleeding, infection, DVT, PE, pneumonia, myocardial infarction, leak along the staple lines, and weight regain. We discussed lifestyle changes necessary to be successful.         This patient does meet the criteria for a surgical weight loss procedure according to NIH guidelines.  The risks of sleeve gastrectomy, Jennifer-en-Y gastric bypass, and duodenal switch surgery including bleeding, leak, wound infection, dehydration, ulcers, internal hernia, DVT/PE, prolonged nausea/vomiting, incomplete resolution of associated medical conditions, reflux, weight regain, vitamin/mineral deficiencies, and death have been explained to the patient and Antionette Felix has expressed understanding and acceptance of them.      The increased risk of substance and alcohol abuse following bariatric surgery was discussed with the patient, along with the negative consequences of substance/alcohol use after surgery including addiction, worsening of mental health disorders, and injury to the stomach. The risk of smoking and vaping (tobacco or any other substance) after bariatric surgery was explained to the patient. This includes risk of anastamotic ulcers, gastritis, bleeding, perforation, stricture, and PO intolerance.  The patient expressed understanding and acceptance of these risks.     The benefits of the above surgeries including weight loss, improvement/resolution of associated medical and mental health conditions, improved mobility, and decreased mortality have been explained the the patient and Antionette Felix has expressed understanding and acceptance of them.        Assessment/Plan  PLAN:  The plan of  treatment for Antionette Felix is to continue with the consultations and tests ordered today in hopes of qualifying for pre-operative clearance for bariatric surgery. This includes:     Consult Nutrition for education and MSWL  Consult Psychology  Consult Physical Therapy for limited mobility  Consult cardiology  Consult pulmonology  Labs/CXR/EKG ordered  EGD  PCP for medical optimization  Consult sleep medicine - concern for CELY  Meal prep  The following are some lifestyle changes you should begin to prepare you for your sleeve surgery.   Eliminate soda and other carbonated beverages from your diet. Carbonation will not be well tolerated after surgery. Try Propel, Vitamin Water Zero, Sobe Lifewater, Crystal Light or water.    Increase fluid consumption to 64 oz daily. Do not drink within 30 minutes of eating as this will liquefy your food and make you hungry more quickly.    Exercise for 30-60 minutes daily. Brisk walking, bike riding and swimming are all examples of healthy exercise. If you are unable to exercise we recommend seated exercise.    Do not skip meals.    Take a multivitamin daily.    Lose weight. In preparation for your surgery it is important that you begin making healthier food choices now. Our dietitian will meet with you to help you select foods lower in calories and higher in nutrition. We would like you to lose at least 15-20  lbs prior to surgery.     Increase your protein intake to 60 grams per day.    Alcohol is empty calories. Please eliminate while preparing for surgery.    Plan your meals.       General Instruction: 1) Use the information we gave you today to work through your insurance requirements and medical clearances.   2) These documents need to get faxed to the program navigators so they can submit them for approval from your insurance company.   3) Obtain labs today at a  facility. We will call you with any abnormalities and corrections you need to make.   4) Continue to work with  your primary care doctor and other specialist so your other health problems are well controlled prior to your surgery.   5) Adopt the recommendations of the program dietician so you develop healthy eating patterns.   6) Work with the sleep team to get your sleep apnea treated to prevent other health problems .   7) Consider attending a support group to learn from other who have been through the process.   8) Come to the MSWL sessions.   45 minutes were spent with patient including history, physical exam, and education.               Electronically signed by Machelle Contreras MD MPH at 2024 12:26 PM         Office Visit on 2024            Revision History          Note shared with patient  PMH:   Patient Active Problem List   Diagnosis    Psychological factors affecting morbid obesity (Multi)    BMI 40.0-44.9, adult (Multi)    Anemia    Eczema    Snoring    Stress incontinence of urine    Encounter for annual physical exam    CELY (obstructive sleep apnea)    Obesity, Class II, BMI 35-39.9    Morbid obesity (Multi)       PSH:   Past Surgical History:   Procedure Laterality Date     SECTION, CLASSIC      OTHER SURGICAL HISTORY  2020     section       Social hx:   Social History     Socioeconomic History    Marital status: Single     Spouse name: Not on file    Number of children: Not on file    Years of education: Not on file    Highest education level: Not on file   Occupational History    Not on file   Tobacco Use    Smoking status: Never    Smokeless tobacco: Never   Substance and Sexual Activity    Alcohol use: Not Currently    Drug use: Not Currently     Types: Marijuana     Comment: Smoked regularly from age [age]; last used 3/2024    Sexual activity: Not on file   Other Topics Concern    Not on file   Social History Narrative    Not on file     Social Determinants of Health     Financial Resource Strain: Not on File (11/10/2023)    Received from [facility]    Financial  Resource Strain     Financial Resource Strain: 0   Food Insecurity: Not on File (2024)    Received from [facility]    Food Insecurity     Food: 0   Transportation Needs: Not on File (11/10/2023)    Received from [facility]    Transportation Needs     Transportation: 0   Physical Activity: Not on File (11/10/2023)    Received from [facility]    Physical Activity     Physical Activity: 0   Stress: Not on File (11/10/2023)    Received from [facility]    Stress     Stress: 0   Social Connections: Not on File (2024)    Received from [facility]    Social Connections     Connectedness: 0   Intimate Partner Violence: Not on file   Housing Stability: Not on File (11/10/2023)    Received from [facility]    Housing Stability     Housin       Initial weight: 280  Current weight: 261.8 lbs    Preop Clearances:  Cardiac: 7.18.24 You do not require additional cardiac testing ahead of your planned surgery.   Pulmonary: 6.4.24 -Asymptomatic from a pulmonary standpoint   Psych: 4.29.24 Psychological contraindications to surgery: A comprehensive review of psychological symptoms reveals no contraindications to surgery   History of Clotting Disorder: NA  Anticoagulation plan: NA  Sleep Study: 6.14.24 Based on the AASM recommended definition, the sleep study is consistent with   a diagnosis of moderate obstructive sleep apnea.   -: 83%    EGD:   Esophagogastroduodenoscopy (EGD)  Order# [id number]  Reading physicians: Pavan Hicks MD; Machelle Contreras MD MPH Ordering provider: Machelle Contreras MD MPH Study date: 24     Result Information    Status: Final result (Resulted: 2024 09:50) Provider Status: Open     Result Text    Result Text   Impression  The middle third of the esophagus, lower third of the esophagus, GE junction and Z-line appeared normal.  2 cm type I hiatal hernia  The cardia, fundus of the stomach and body of the stomach appeared normal.  Abnormal mucosa  Performed forceps biopsies in the 2nd  part of the duodenum  Polyp in the 2nd part of the duodenum        Findings  The middle third of the esophagus, lower third of the esophagus, GE junction and Z-line appeared normal.  2 cm sliding hiatal hernia (type I hiatal hernia) without Po lesions present - GE junction 36 cm from the incisors, diaphragmatic impression 38 cm from the incisors, confirmed by retroflexion. Hill grade I hiatal hernia  The cardia, fundus of the stomach and body of the stomach appeared normal.  Mild, patchy edematous mucosa in the antrum;  Abnormal mucosa in the 2nd part of the duodenum. Duodenal polyp;  Performed forceps biopsies in the 2nd part of the duodenum. Duodenal polyp  Polyp in the 2nd part of the duodenum        Recommendation    Follow up with me in clinic     Cont to lose weight   Fu pathology   Cont to fu with program requirements   Started on PPI             Indication  Bariatric surgery status, BMI 40.0-44.9, adult (Multi)     Staff  Staff Role   Machelle Contreras MD MPH Proceduralist         Medications  No administrations occurring from 0810 to 0850 on 05/23/24         Preprocedure  A history and physical has been performed, and patient medication allergies have been reviewed. The patient's tolerance of previous anesthesia has been reviewed. The risks and benefits of the procedure and the sedation options and risks were discussed with the patient. All questions were answered and informed consent obtained.     Details of the Procedure  The patient underwent moderate sedation, which was administered by the procedural nurse. The patient's blood pressure, ECG, ETCO2, heart rate, level of consciousness, oxygen and respirations were monitored throughout the procedure. The scope was introduced through the mouth and advanced to the second part of the duodenum. Retroflexion was performed in the cardia. The patient experienced no blood loss. The procedure was not difficult. The patient tolerated the procedure well. There were  no apparent adverse events.      Events      Procedure Events   Event Event Time   ENDO SCOPE IN TIME 5/23/2024  8:31 AM   ENDO SCOPE OUT TIME 5/23/2024  8:41 AM         Specimens  ID Type Source Tests Collected by Time   1 : STOMACH ANTRUM BIOPSY, RULE OUT H. PYLORI Tissue STOMACH ANTRUM BIOPSY SURGICAL PATHOLOGY EXAM Machelle Contreras MD MPH 5/23/2024 0845   2 : DUODENAL BIOPSY Tissue DUODENUM SECOND PART BIOPSY SURGICAL PATHOLOGY EXAM Machelle Contreras MD MPH 5/23/2024 0843         Procedure Location  [facility] [place]  [address] [place] [address]  [phone]     Referring Provider  Machelle Contreras MD MPH  [address]  [place] [address]     Procedure Provider  Machelle Contreras MD MPH        Result History    Esophagogastroduodenoscopy (EGD) (Order #[id number]) on 5/23/2024 - Order Result History Report    Procedure Images      Middle Third of Esophagus       Anatomical Diagram      KIRAN Video     Show Video  External Procedure Report    Open External Result Report     Intraprocedure (10/14/2024 09:25:26 to 10/14/2024 09:25:26)    5/23/2024 5/23/2024 Event Details User   07:02:14 In Preprocedure Status: Preprocedure --   07:18:37 Timeout: Preprocedure Checklist Verified by Serafin Aranda RN at 05/23/2024 0718 Serafin Aranda RN   07:38:46 Preprocedure Complete  --   08:11:16 Staff Arrived Terry Burleson RN [Circulator] Terry Burleson RN   08:11:23 Staff Arrived Soni Leavitt [Scrub Person] Terry Burleson RN   08:11:40 Staff Arrived LUMA CRISTINA [Other - Observing Patient Procedure] Terry Burleson RN   08:23 Anesthesia Start  --   08:26:09 In Room Status: Procedure --   08:28 Timeout: Huddle Verified by Terry Burleson RN at 05/23/2024 0852 Terry Burleson RN   08:28 Timeout: Preprocedure Verified by Terry Burleson RN at 05/23/2024 0853 Terry Burleson RN   08:28 Timeout: Fire Safety Verified by Terry Burleson RN at 05/23/2024 0853 Terry Burleson RN   08:28:46  Staff Arrived JASPREET MCINTYRE [Physician] Terry Burleson RN   08:29 Anesthesia Ready  --   08:39:38 Staff Arrived Machelle Kelly MD MPH [Proceduralist] Terry Burleson, RN   08:42 Timeout: Debrief Verified by Terry Burleson, RN at 05/23/2024 0853 Terry Burleson, RN   08:48 Staff Departed Machelle Kelly MD MPH [Proceduralist] Terry Burleson, RN   08:48:21 Staff Departed Terry Burleson RN [Circulator]; Soni Leavitt [Scrub Person]; LUMA CRISTINA [Other - Observing Patient Procedure]; JASPREET MCINTYRE [Physician] Terry Burleson RN   08:48:21 Out of Room  --   08:49 In Phase II Status: Phase II --   08:53 Anesthesia Stop  --   09:27 Phase II Care Complete  --   09:30:44 Out of Phase II  --   09:30:45 Procedural Charting Complete Status: Complete --       Esophagogastroduodenoscopy (EGD): Patient Communication     Add Comments   Seen     Related Specimens    Surgical Pathology Exam (Order #[id number]) - Result Report        Order-Level Documents    Endoscopy Image - Scan on 7/24/24 11:14 AM by ROSMERY KELLYNA   Endoscopy Image - Scan on 7/24/24 11:14 AM by ROBIN MACHELLE   Endoscopy Image - Scan on 7/24/24 11:14 AM by ROBIN, MACHELLE   Endoscopy Image - Scan on 7/24/24 11:14 AM by ROBIN, MACHELLE   Endoscopy Image - Scan on 7/24/24 11:14 AM by ROBIN, MACHELLE   Endoscopy Image - Scan on 7/24/24 11:14 AM by ROBIN, MACHELLE   Endoscopy Image - Scan on 7/24/24 11:14 AM by ROBIN, MACHELLE   Endoscopy Image - Scan on 7/24/24 11:14 AM by ROBIN, MACHELLE   Endoscopy Image - Scan on 7/24/24 10:59 AM by ROBIN, MACHELLE   Endoscopy Image - Scan on 7/24/24 10:57 AM by ROBIN, MACHELLE   Endoscopy Image - Scan on 7/24/24 10:57 AM by ROBIN, MACHELLE   Endoscopy Image - Scan on 7/24/24 10:57 AM by ROBIN MACHELLE   Endoscopy Image - Scan on 7/24/24 10:57 AM by ROBIN, MACHELLE   Endoscopy Image - Scan on 7/24/24 10:57 AM by MACHELLE KELLY   Endoscopy Image - Scan on 7/24/24 10:55 AM by ROBIN, MACHELLE   Endoscopy Anatomical Diagram - Scan on 5/23/24 9:50 AM by  RONALD KELLY of Endoscopy Anatomical Diagram     MEDICATIONS:  Prior to Admission Medications:    Current Outpatient Medications:     ascorbic acid/collagen hydr (COLLAGEN SKIN RENEWAL ORAL), Take by mouth. 1 Gummy daily, Disp: , Rfl:     cetirizine (ZyrTEC) 10 mg tablet, Take 1 tablet (10 mg) by mouth once daily., Disp: , Rfl:     multivitamin tablet, Take 1 tablet by mouth once daily., Disp: , Rfl:     omega 3-dha-epa-fish oil (Fish OiL) 1,000 mg (120 mg-180 mg) capsule, Take 1 capsule (1,000 mg) by mouth once daily., Disp: , Rfl:     omeprazole (PriLOSEC) 40 mg DR capsule, Take 1 capsule (40 mg) by mouth once daily in the morning. Take before meals. Please remove capsule and use granules and mix with sugar free pudding or jello, Disp: 30 capsule, Rfl: 2    omeprazole (PriLOSEC) 40 mg DR capsule, Take 1 capsule (40 mg) by mouth 2 times a day before meals for 14 days. Please remove capsule and use granules and mix with sugar free pudding or jello, Disp: 30 capsule, Rfl: 1    ALLERGIES:  No Known Allergies    REVIEW OF SYSTEMS:  GENERAL: Obese. Negative for malaise, significant weight loss and fever  NECK: Negative for lumps, goiter, pain and significant neck swelling  RESPIRATORY: Negative for cough, wheezing or shortness of breath.  CARDIOVASCULAR: Negative for chest pain, leg swelling or palpitations.  GI: Negative for abdominal discomfort, blood in stools or black stools or change in bowel habits  : No history of dysuria, frequency or incontinence  MUSCULOSKELETAL: Negative for joint pain or swelling, back pain or muscle pain.  SKIN: Negative for lesions, rash, and itching.  PSYCH: Negative for sleep disturbance, mood disorder and recent psychosocial stressors.  ENDOCRINE: Negative for cold or heat intolerance, polyuria, polydipsia and goiter.    PHYSICAL EXAM:  Visit Vitals  /85   Pulse 71       General appearance: obese  Skin: warm, no erythema or rashes  Lungs: clear to percussion and  auscultation  Heart: regular rhythm and S1, S2 normal  Abdomen: soft, non-tender, no masses, no organomegaly  Extremities: Normal exam of the extremities. No swelling or pain.    No results found for this or any previous visit (from the past 24 hour(s)).    IMPRESSION:  Antionette Felix is a 38 y.o. y.o. female with a BMI of Body mass index is 38.66 kg/m².. Antionette Felix is a 38 y.o. female with a bmi of Body mass index is 38.99 kg/m². with the following diagnoses and co-morbidities: knee weakness.  All can be helped with weight loss.  She is a great candidate for Weight loss surgery, also has stress inctinence and fatigue.  She is eager for a sleeve and has already quit wmoking marijuana 4 motnhs ago.    Notes not as hungry since quit smoking marijuana.  She notes this has been a good change and has lost some weight since she and I spoke the last time.  She is making better food choices.     With her lifestyle changes, she has lost almost 20 lbs.  She is walking more with a new dog and is more active.   She is feeling great.  She is eager to proceed.  She is a great candidate for sleeve gastrectomy.  We discussed the risks and benefits of the surgery at length and all questions were answered.   She notes the process helped her change life a lot.     They have been preoperatively evaluated and deemed to be an appropriate candidate for bariatric surgery.  Surgery Type: Laparoscopic sleeve gastrectomy  97913    All testing reviewed.  All clearances contained    The risks of Laparoscopic sleeve gastrectomy  34767 surgery including bleeding, leak, wound infection, dehydration, ulcers, internal hernia, DVT/PE, prolonged nausea/vomiting, incomplete resolution of associated medical conditions, reflux, weight regain, vitamin/mineral deficiencies, and death have been explained to the patient and Antionette Felix has expressed understanding and acceptance of them.    The benefits of the above surgery including weight loss,  improvement/resolution of associated medical and mental health conditions, improved mobility, and decreased mortality have been explained the the patient and Antionette Felix has expressed understanding and acceptance of them.    Operative and blood transfusion consent forms were signed by the patient and witnessed today.  PLAN:  You are scheduled for a sleeve  on 10/24    You will receive a phone call the day prior to surgery with your OR arrival time.    Be sure to read over your Pre-Op book for final preparation for surgery.    Make a shopping list and  your supplements prior to surgery.    You have been provided with a pain script today- TAKE THIS TODAY to your pharmacy as it may need prior authorization.    Electronic prescriptions were sent to your pharmacy. Prescriptions for Omeprazole (anacid), Enoxaparin (DVT Prophylaxis) and Ondansetron (anti-nausea) have been sent to your retail pharmacy. You will likely get these medications in the hospital.  If not you can Pick these up after surgery.    Be sure to take the omeprazole, open the capsule and sprinkle over SF applesauce and take every day for 6 months after surgery. DO NOT MISS A DOSE. This will help you to heal.    Call with any questions! [phone] giuliano Haro    1) Reread your pre op manual to be familiar with pre op and post op expectations    2) Follow the pre op diet exactly so your liver shrinks down prior to your surgery:     -DAY BEFORE SURGERY: NO SOLID FOODS! You may have up to 3 protein shakes BEFORE 3pm. After 3pm, you may only have CLEAR, non-caloric liquids w/ NO red dye.   -DAY OF SURGERY: Do not eat or drink anything unless specified by the doctor.    3) Obtain the over the counter items you need in the home prior to your surgery    4) Be certain you have an appointment with your medical doctors who will need to tweak your prescription medications after surgery as you drop weight rapidly.      5) Make sure you have follow up  appointments 2 weeks after surgery and at the 6 week emma    6) --Family Medical Leave paperwork is available through your human resources department. Please call your human resources department to obtain the paperwork. You must allow up to two weeks for paperwork to be completed. You will need to fill out a portion of the paperwork before faxing it to our office for completion. In addition, indicate what kind of leave you are requesting (intermittent or continuous) and how long you are requesting the leave extend. Please fax the paperwork to [phone]   Prescriptions for all required post-operative home medications were sent to the [facility] pharmacy today and will be delivered to the patient's bedside on the day of discharge.    Further education was provided on day of surgery instructions and what to expect from the inpatient admission after surgery.

## 2024-10-14 NOTE — PATIENT INSTRUCTIONS
PLAN:  You are scheduled for a sleeve  on 10/24    You will receive a phone call the day prior to surgery with your OR arrival time.    Be sure to read over your Pre-Op book for final preparation for surgery.    Make a shopping list and  your supplements prior to surgery.    You have been provided with a pain script today- TAKE THIS TODAY to your pharmacy as it may need prior authorization.    Electronic prescriptions were sent to your pharmacy. Prescriptions for Omeprazole (anacid), Enoxaparin (DVT Prophylaxis) and Ondansetron (anti-nausea) have been sent to your retail pharmacy. You will likely get these medications in the hospital.  If not you can Pick these up after surgery.    Be sure to take the omeprazole, open the capsule and sprinkle over SF applesauce and take every day for 6 months after surgery. DO NOT MISS A DOSE. This will help you to heal.    Call with any questions! [phone] giuliano Haro    1) Reread your pre op manual to be familiar with pre op and post op expectations    2) Follow the pre op diet exactly so your liver shrinks down prior to your surgery:     -DAY BEFORE SURGERY: NO SOLID FOODS! You may have up to 3 protein shakes BEFORE 3pm. After 3pm, you may only have CLEAR, non-caloric liquids w/ NO red dye.   -DAY OF SURGERY: Do not eat or drink anything unless specified by the doctor.    3) Obtain the over the counter items you need in the home prior to your surgery    4) Be certain you have an appointment with your medical doctors who will need to tweak your prescription medications after surgery as you drop weight rapidly.      5) Make sure you have follow up appointments 2 weeks after surgery and at the 6 week emma    6) --Family Medical Leave paperwork is available through your human resources department. Please call your human resources department to obtain the paperwork. You must allow up to two weeks for paperwork to be completed. You will need to fill out a portion of the  paperwork before faxing it to our office for completion. In addition, indicate what kind of leave you are requesting (intermittent or continuous) and how long you are requesting the leave extend. Please fax the paperwork to [phone]   Prescriptions for all required post-operative home medications were sent to the [facility] pharmacy today and will be delivered to the patient's bedside on the day of discharge.    Further education was provided on day of surgery instructions and what to expect from the inpatient admission after surgery.

## 2024-10-16 ENCOUNTER — PRE-ADMISSION TESTING (OUTPATIENT)
Dept: PREADMISSION TESTING | Facility: HOSPITAL | Age: 39
End: 2024-10-16
Payer: COMMERCIAL

## 2024-10-16 VITALS
BODY MASS INDEX: 38.2 KG/M2 | DIASTOLIC BLOOD PRESSURE: 81 MMHG | HEIGHT: 69 IN | HEART RATE: 58 BPM | SYSTOLIC BLOOD PRESSURE: 120 MMHG | WEIGHT: 257.94 LBS | RESPIRATION RATE: 18 BRPM | OXYGEN SATURATION: 99 % | TEMPERATURE: 97.3 F

## 2024-10-16 DIAGNOSIS — Z98.84 BARIATRIC SURGERY STATUS: ICD-10-CM

## 2024-10-16 DIAGNOSIS — Z01.818 PREOPERATIVE CLEARANCE: ICD-10-CM

## 2024-10-16 LAB
ABO GROUP (TYPE) IN BLOOD: NORMAL
ALBUMIN SERPL BCP-MCNC: 4.2 G/DL (ref 3.4–5)
ALP SERPL-CCNC: 46 U/L (ref 33–110)
ALT SERPL W P-5'-P-CCNC: 13 U/L (ref 7–45)
ANION GAP SERPL CALC-SCNC: 11 MMOL/L (ref 10–20)
ANTIBODY SCREEN: NORMAL
APPEARANCE UR: CLEAR
AST SERPL W P-5'-P-CCNC: 15 U/L (ref 9–39)
BASOPHILS # BLD AUTO: 0.05 X10*3/UL (ref 0–0.1)
BASOPHILS NFR BLD AUTO: 0.7 %
BILIRUB SERPL-MCNC: 0.3 MG/DL (ref 0–1.2)
BILIRUB UR STRIP.AUTO-MCNC: NEGATIVE MG/DL
BUN SERPL-MCNC: 10 MG/DL (ref 6–23)
CALCIUM SERPL-MCNC: 9.6 MG/DL (ref 8.6–10.3)
CHLORIDE SERPL-SCNC: 102 MMOL/L (ref 98–107)
CO2 SERPL-SCNC: 28 MMOL/L (ref 21–32)
COLOR UR: YELLOW
CREAT SERPL-MCNC: 0.82 MG/DL (ref 0.5–1.05)
EGFRCR SERPLBLD CKD-EPI 2021: >90 ML/MIN/1.73M*2
EOSINOPHIL # BLD AUTO: 0.06 X10*3/UL (ref 0–0.7)
EOSINOPHIL NFR BLD AUTO: 0.8 %
ERYTHROCYTE [DISTWIDTH] IN BLOOD BY AUTOMATED COUNT: 12.2 % (ref 11.5–14.5)
GLUCOSE SERPL-MCNC: 85 MG/DL (ref 74–99)
GLUCOSE UR STRIP.AUTO-MCNC: NORMAL MG/DL
HCT VFR BLD AUTO: 40.2 % (ref 36–46)
HGB BLD-MCNC: 13.2 G/DL (ref 12–16)
HOLD SPECIMEN: NORMAL
IMM GRANULOCYTES # BLD AUTO: 0.01 X10*3/UL (ref 0–0.7)
IMM GRANULOCYTES NFR BLD AUTO: 0.1 % (ref 0–0.9)
INR PPP: 1.1 (ref 0.9–1.1)
KETONES UR STRIP.AUTO-MCNC: NEGATIVE MG/DL
LEUKOCYTE ESTERASE UR QL STRIP.AUTO: NEGATIVE
LYMPHOCYTES # BLD AUTO: 4.18 X10*3/UL (ref 1.2–4.8)
LYMPHOCYTES NFR BLD AUTO: 56 %
MCH RBC QN AUTO: 29.9 PG (ref 26–34)
MCHC RBC AUTO-ENTMCNC: 32.8 G/DL (ref 32–36)
MCV RBC AUTO: 91 FL (ref 80–100)
MONOCYTES # BLD AUTO: 0.46 X10*3/UL (ref 0.1–1)
MONOCYTES NFR BLD AUTO: 6.2 %
MUCOUS THREADS #/AREA URNS AUTO: ABNORMAL /LPF
NEUTROPHILS # BLD AUTO: 2.71 X10*3/UL (ref 1.2–7.7)
NEUTROPHILS NFR BLD AUTO: 36.2 %
NITRITE UR QL STRIP.AUTO: NEGATIVE
NRBC BLD-RTO: 0 /100 WBCS (ref 0–0)
PH UR STRIP.AUTO: 6 [PH]
PLATELET # BLD AUTO: 356 X10*3/UL (ref 150–450)
POTASSIUM SERPL-SCNC: 4.3 MMOL/L (ref 3.5–5.3)
PROT SERPL-MCNC: 7.3 G/DL (ref 6.4–8.2)
PROT UR STRIP.AUTO-MCNC: NORMAL MG/DL
PROTHROMBIN TIME: 11.9 SECONDS (ref 9.8–12.8)
RBC # BLD AUTO: 4.42 X10*6/UL (ref 4–5.2)
RBC # UR STRIP.AUTO: NEGATIVE /UL
RBC #/AREA URNS AUTO: ABNORMAL /HPF
RH FACTOR (ANTIGEN D): NORMAL
SODIUM SERPL-SCNC: 137 MMOL/L (ref 136–145)
SP GR UR STRIP.AUTO: 1.03
UROBILINOGEN UR STRIP.AUTO-MCNC: NORMAL MG/DL
WBC # BLD AUTO: 7.5 X10*3/UL (ref 4.4–11.3)
WBC #/AREA URNS AUTO: ABNORMAL /HPF
WBC CLUMPS #/AREA URNS AUTO: ABNORMAL /HPF

## 2024-10-16 PROCEDURE — 81001 URINALYSIS AUTO W/SCOPE: CPT

## 2024-10-16 PROCEDURE — 99244 OFF/OP CNSLTJ NEW/EST MOD 40: CPT | Performed by: NURSE PRACTITIONER

## 2024-10-16 PROCEDURE — 85025 COMPLETE CBC W/AUTO DIFF WBC: CPT

## 2024-10-16 PROCEDURE — 36415 COLL VENOUS BLD VENIPUNCTURE: CPT

## 2024-10-16 PROCEDURE — 85610 PROTHROMBIN TIME: CPT

## 2024-10-16 PROCEDURE — 86900 BLOOD TYPING SEROLOGIC ABO: CPT

## 2024-10-16 PROCEDURE — 80323 ALKALOIDS NOS: CPT

## 2024-10-16 PROCEDURE — 80053 COMPREHEN METABOLIC PANEL: CPT

## 2024-10-16 ASSESSMENT — DUKE ACTIVITY SCORE INDEX (DASI)
CAN YOU WALK INDOORS, SUCH AS AROUND YOUR HOUSE: YES
CAN YOU DO YARD WORK LIKE RAKING LEAVES, WEEDING OR PUSHING A MOWER: YES
DASI METS SCORE: 8.2
CAN YOU PARTICIPATE IN STRENOUS SPORTS LIKE SWIMMING, SINGLES TENNIS, FOOTBALL, BASKETBALL, OR SKIING: NO
CAN YOU WALK A BLOCK OR TWO ON LEVEL GROUND: YES
CAN YOU PARTICIPATE IN MODERATE RECREATIONAL ACTIVITIES LIKE GOLF, BOWLING, DANCING, DOUBLES TENNIS OR THROWING A BASEBALL OR FOOTBALL: NO
TOTAL_SCORE: 44.7
CAN YOU RUN A SHORT DISTANCE: YES
CAN YOU DO LIGHT WORK AROUND THE HOUSE LIKE DUSTING OR WASHING DISHES: YES
CAN YOU TAKE CARE OF YOURSELF (EAT, DRESS, BATHE, OR USE TOILET): YES
CAN YOU DO MODERATE WORK AROUND THE HOUSE LIKE VACUUMING, SWEEPING FLOORS OR CARRYING GROCERIES: YES
CAN YOU DO HEAVY WORK AROUND THE HOUSE LIKE SCRUBBING FLOORS OR LIFTING AND MOVING HEAVY FURNITURE: YES
CAN YOU CLIMB A FLIGHT OF STAIRS OR WALK UP A HILL: YES
CAN YOU HAVE SEXUAL RELATIONS: YES

## 2024-10-16 ASSESSMENT — ENCOUNTER SYMPTOMS
CONSTITUTIONAL NEGATIVE: 1
NEUROLOGICAL NEGATIVE: 1
MUSCULOSKELETAL NEGATIVE: 1
NECK NEGATIVE: 1
GASTROINTESTINAL NEGATIVE: 1
CARDIOVASCULAR NEGATIVE: 1
RESPIRATORY NEGATIVE: 1

## 2024-10-16 ASSESSMENT — ACTIVITIES OF DAILY LIVING (ADL): ADL_SCORE: 0

## 2024-10-16 ASSESSMENT — LIFESTYLE VARIABLES: SMOKING_STATUS: NONSMOKER

## 2024-10-16 NOTE — PREPROCEDURE INSTRUCTIONS
Thank you for visiting [facility] ([facility]) today for your pre-procedure evaluation, you were seen by     Kimberlee Shaver CNP  Pre Admission Testing  [facility]  [phone]    This summary includes instructions and information to aid you during your perioperative period.  Please read carefully. If you have any questions about your visit today, please call the number listed above.  If you become ill or have any changes to your health before your surgery, please contact your primary care provider and alert your surgeon.    Preparing for your Surgery       Exercises  Preoperative Deep Breathing Exercises  Why it is important to do deep breathing exercises before my surgery?  Deep breathing exercises strengthen your breathing muscles.  This helps you to recover after your surgery and decreases the chance of breathing complications.  How are the deep breathing exercises done?  Sit straight with your back supported.  Breathe in deeply and slowly through your nose. Your lower rib cage should expand and your abdomen may move forward.  Hold that breath for 3 to 5 seconds.  Breathe out through pursed lips, slowly and completely.  Rest and repeat 10 times every hour while awake.  Rest longer if you become dizzy or lightheaded.       Preoperative Brain Exercises    What are brain exercises?  A brain exercise is any activity that engages your thinking (cognitive) skills.    What types of activities are considered brain exercises?  Jigsaw puzzles, crossword puzzles, word jumble, memory games, word search, and many more.  Many can be found free online or on your phone via a mobile sunday.    Why should I do brain exercises before my surgery?  More recent research has shown brain exercise before surgery can lower the risk of postoperative delirium (confusion) which can be especially important for older adults.  Patients who did brain exercises for 5 to 10 hours the days before surgery, cut their risk  of postoperative delirium in half up to 1 week after surgery.    Sit-to-Stand Exercise    What is the sit-to-stand exercise?  The sit-to-stand exercise strengthens the muscles of your lower body and muscles in the center of your body (core muscles for stability) helping to maintain and improve your strength and mobility.  How do I do the sit-to-stand exercise?  The goal is to do this exercise without using your arms or hands.  If this is too difficult, use your arms and hands or a chair with armrests to help slowly push yourself to the standing position and lower yourself back to the sitting position. As the movement becomes easier use your arms and hands less.    Steps to the sit-to-stand exercise  Sit up tall in a sturdy chair, knees bent, feet flat on the floor shoulder-width apart.  Shift your hips/pelvis forward in the chair to correctly position yourself for the next movement.  Lean forward at your hips.  Stand up straight putting equal weight on both feet.  Check to be sure you are properly aligned with the chair, in a slow controlled movement sit back down.  Repeat this exercise 10-15 times.  If needed you can do it fewer times until your strength improves.  Rest for 1 minute.  Do another 10-15 sit-to-stand exercises.  Try to do this in the morning and evening.        Instructions    Preoperative Fasting Guidelines    Why must I stop eating and drinking near surgery time?  With sedation, food or liquid in your stomach can enter your lungs causing serious complications  Food can increase nausea and vomiting  When do I need to stop eating and drinking before my surgery?      Do not eat any food after midnight the night before your surgery/procedure. You may have up to 13.5 ounces of clear liquid until TWO hours before your instructed arrival time to the hospital.  This includes water, black tea/coffee, (no milk or cream) apple juice, and electrolyte drinks (Gatorade). You may chew gum until TWO hours before  your surgery/procedure        Simple things you can do to help prevent blood clots     Blood clots are blockages that can form in the body's veins. When a blood clot forms in your deep veins, it may be called a deep vein thrombosis, or DVT for short. Blood clots can happen in any part of the body where blood flows, but they are most common in the arms and legs. If a piece of a blood clot breaks free and travels to the lungs, it is called a pulmonary embolus (PE). A PE can be a very serious problem.         Being in the hospital or having surgery can raise your chances of getting a blood clot because you may not be well enough to move around as much as you normally do.         Ways you can help prevent blood clots in the hospital       Wearing SCDs  SCDs stands for Sequential Compression Devices.   SCDs are special sleeves that wrap around your legs. They attach to a pump that fills them with air to gently squeeze your legs every few minutes.  This helps return the blood in your legs to your heart.   SCDs should only be taken off when walking or bathing. SCDs may not be comfortable, but they can help save your life.              Pump SCD leg sleeves  Wearing compression stockings - if your doctor orders them. These special snug-fitting stockings gently squeeze your legs to help blood flow.       Walking. Walking helps move the blood in your legs.   If your doctor says it is ok, try walking the halls at least   5 times a day. Ask us to help you get up, so you don't fall.      Taking any blood-thinning medicines your doctor orders.              Ways you can help prevent blood clots at home         Wearing compression stockings - if your doctor orders them.   Walking - to help move the blood in your legs.    Taking any blood-thinning medicines your doctor orders.      Signs of a blood clot or PE    Tell your doctor or nurse right away if you have any of the problems listed below.         If you are at home, seek medical  care right away. Call 911 for chest pain or problems breathing.            Signs of a blood clot (DVT) - such as pain, swelling, redness, or warmth in your arm or legs.  Signs of a pulmonary embolism (PE) - such as chest pain or feeling short of breath      Tobacco and Alcohol;  Do not drink alcohol or smoke within 24 hours of surgery.  It is best to quit smoking for as long as possible before any surgery or procedure.        The Week before Surgery        Seven days before Surgery  Check your PAT medication instructions  Do the exercises provided to you by PAT  Arrange for a responsible, adult licensed  to take you home after surgery and stay with you for 24 hours.  You will not be permitted to drive yourself home if you have received any anesthetic/sedation  Six days before surgery  Check your PAT medication instructions  Do the exercises provided to you by PAT  Start using Chlorhexidene (CHG) body wash if prescribed  Five days before surgery  Check your PAT medication instructions  Do the exercises provided to you by PAT  Continue to use CHG body wash if prescribed  Three days before surgery  Check your PAT medication instructions  Do the exercises provided to you by PAT  Continue to use CHG body wash if prescribed  Two days before surgery  Check your PAT medication instructions  Do the exercises provided to you by PAT  Continue to use CHG body wash if prescribed    The Day before Surgery       Check your PAT medication and all other PAT instructions including when to stop eating and drinking  You will be called with your arrival time for surgery in the late afternoon.  If you do not receive a call please reach out to [facility]. [phone]  Do not smoke or drink 24 hours before surgery  Prepare items to bring with you to the hospital  Shower with your chlorhexidine wash if prescribed  Brush your teeth and use your chlorhexidine dental rinse if prescribed    The Day of Surgery       Check your PAT  medication instructions  Ensure you follow the instructions for when to stop eating and drinking  Shower, if prescribed use CHG.  Do not apply any lotions, creams, moisturizers, perfume or deodorant  Brush your teeth and use your CHG dental rinse if prescribed  Wear loose comfortable clothing  Avoid make-up  Remove  jewelry and piercings, consider professional piercing removal with a plastic spacer if needed  Bring photo ID and Insurance card  Bring an accurate medication list that includes medication dose, frequency and allergies  Bring a copy of your advanced directives (will, health care power of )  Bring any devices and controllers as well as medical devices you have been provided with for surgery (CPAP, slings, braces, etc.)  Dentures, eyeglasses, and contacts will be removed before surgery, please bring cases for contacts or glasses

## 2024-10-16 NOTE — CPM/PAT H&P
CPM/PAT Evaluation       Name: Antionette Felix (Antionette Felix)  /Age: 1985/38 y.o.     Visit Type:   In-Person       Chief Complaint: Morbid obesity    HPI 37 y/o female scheduled for laparoscopic gastric sleeve on 10/24/2024 with  Dr. Contreras secondary to morbid obesity.  PMHX includes morbid obesity.  PAT is consulted today for perioperative risk stratification and optimization.      Past Medical History:   Diagnosis Date    Bariatric surgery status     Bariatric surgery status    Class 2 obesity with body mass index (BMI) of 38.0 to 38.9 in adult 2024    Encounter for pre-operative cardiovascular clearance 2023    Gayle Rouse, APRN-CNP for Bariatric Procedure    Marijuana use 2024    Personal history of diseases of the skin and subcutaneous tissue 2021    History of eczema    PONV (postoperative nausea and vomiting)     Stress incontinence     Suspected sleep apnea     Snoring       Past Surgical History:   Procedure Laterality Date     SECTION, CLASSIC      OTHER SURGICAL HISTORY  2020     section       Patient  has no history on file for sexual activity.    Family History   Problem Relation Name Age of Onset    Diabetes Mother         No Known Allergies    Prior to Admission medications    Medication Sig Start Date End Date Taking? Authorizing Provider   ascorbic acid/collagen hydr (COLLAGEN SKIN RENEWAL ORAL) Take by mouth. 1 Gummy daily    Historical Provider, MD   cetirizine (ZyrTEC) 10 mg tablet Take 1 tablet (10 mg) by mouth once daily.    Historical Provider, MD   multivitamin tablet Take 1 tablet by mouth once daily.    Historical Provider, MD   omega 3-dha-epa-fish oil (Fish OiL) 1,000 mg (120 mg-180 mg) capsule Take 1 capsule (1,000 mg) by mouth once daily.    Historical Provider, MD   omeprazole (PriLOSEC) 40 mg DR capsule Take 1 capsule (40 mg) by mouth once daily in the morning. Take before meals. Please remove capsule and use granules and mix  with sugar free pudding or jello 5/23/24 8/21/24  Pavan Hicks MD   omeprazole (PriLOSEC) 40 mg DR capsule Take 1 capsule (40 mg) by mouth 2 times a day before meals for 14 days. Please remove capsule and use granules and mix with sugar free pudding or jello 8/23/24 9/6/24  Kevyn Buckley MD        PAT ROS:   Constitutional:   neg    Neuro/Psych:   neg    Eyes:    use of corrective lenses  Ears:   neg    Nose:   Mouth:   Throat:   neg    Neck:   neg    Cardio:   neg    Respiratory:   neg    Endocrine:   GI:   neg    :   neg    Musculoskeletal:   neg    Hematologic:   neg    Skin:      Physical Exam  Constitutional:       Appearance: Normal appearance.   HENT:      Head: Normocephalic and atraumatic.      Mouth/Throat:      Mouth: Mucous membranes are moist.      Pharynx: Oropharynx is clear.   Eyes:      Extraocular Movements: Extraocular movements intact.      Pupils: Pupils are equal, round, and reactive to light.   Cardiovascular:      Rate and Rhythm: Normal rate and regular rhythm.   Pulmonary:      Effort: Pulmonary effort is normal.      Breath sounds: Normal breath sounds.   Abdominal:      General: Abdomen is flat.      Palpations: Abdomen is soft.   Musculoskeletal:         General: Normal range of motion.      Cervical back: Normal range of motion and neck supple.   Skin:     General: Skin is warm and dry.   Neurological:      General: No focal deficit present.      Mental Status: She is alert and oriented to person, place, and time.   Psychiatric:         Mood and Affect: Mood normal.         Behavior: Behavior normal.          PAT AIRWAY:   Airway:     Neck ROM::  Full  normal        Visit Vitals  /81   Pulse 58   Temp 36.3 °C (97.3 °F) (Temporal)   Resp 18       DASI Risk Score      Flowsheet Row Pre-Admission Testing from 10/16/2024 in [facility]   Can you take care of yourself (eat, dress, bathe, or use toilet)?  2.75 filed at 10/16/2024 0950   Can you walk indoors, such as  around your house? 1.75 filed at 10/16/2024 0950   Can you walk a block or two on level ground?  2.75 filed at 10/16/2024 0950   Can you climb a flight of stairs or walk up a hill? 5.5 filed at 10/16/2024 0950   Can you run a short distance? 8 filed at 10/16/2024 0950   Can you do light work around the house like dusting or washing dishes? 2.7 filed at 10/16/2024 0950   Can you do moderate work around the house like vacuuming, sweeping floors or carrying groceries? 3.5 filed at 10/16/2024 0950   Can you do heavy work around the house like scrubbing floors or lifting and moving heavy furniture?  8 filed at 10/16/2024 0950   Can you do yard work like raking leaves, weeding or pushing a mower? 4.5 filed at 10/16/2024 0950   Can you have sexual relations? 5.25 filed at 10/16/2024 0950   Can you participate in moderate recreational activities like golf, bowling, dancing, doubles tennis or throwing a baseball or football? 0 filed at 10/16/2024 0950   Can you participate in strenous sports like swimming, singles tennis, football, basketball, or skiing? 0 filed at 10/16/2024 0950   DASI SCORE 44.7 filed at 10/16/2024 0950   METS Score (Will be calculated only when all the questions are answered) 8.2 filed at 10/16/2024 0950          Caprini DVT Assessment      Flowsheet Row Pre-Admission Testing from 10/16/2024 in [facility]   DVT Score 2 filed at 10/16/2024 1005   Surgical Factors Major surgery planned, including arthroscopic and laproscopic (1-2 hours) filed at 10/16/2024 1005          Modified Frailty Index    No data to display       CHADS2 Stroke Risk  Current as of 49 minutes ago        N/A 3 to 100%: High Risk   2 to < 3%: Medium Risk   0 to < 2%: Low Risk     Last Change: N/A          This score determines the patient's risk of having a stroke if the patient has atrial fibrillation.        This score is not applicable to this patient. Components are not calculated.          Revised Cardiac Risk Index       Flowsheet Row Pre-Admission Testing from 10/16/2024 in [facility]   High-Risk Surgery (Intraperitoneal, Intrathoracic,Suprainguinal vascular) 0 filed at 10/16/2024 0853   History of ischemic heart disease (History of MI, History of positive exercuse test, Current chest paint considered due to myocardial ischemia, Use of nitrate therapy, ECG with pathological Q Waves) 0 filed at 10/16/2024 0853   History of congestive heart failure (pulmonary edemia, bilateral rales or S3 gallop, Paroxysmal nocturnal dyspnea, CXR showing pulmonary vascular redistribution) 0 filed at 10/16/2024 0853   History of cerebrovascular disease (Prior TIA or stroke) 0 filed at 10/16/2024 0853   Pre-operative insulin treatment 0 filed at 10/16/2024 0853   Pre-operative creatinine>2 mg/dl 0 filed at 10/16/2024 0853   Revised Cardiac Risk Calculator 0 filed at 10/16/2024 0853          Apfel Simplified Score      Flowsheet Row Pre-Admission Testing from 10/16/2024 in [facility]   Smoking status 1 filed at 10/16/2024 0853   History of motion sickness or PONV  1 filed at 10/16/2024 0853   Use of postoperative opioids 1 filed at 10/16/2024 0853   Gender - Female 1=Yes filed at 10/16/2024 0853   Apfel Simplified Score Calculator 4 filed at 10/16/2024 0853          Risk Analysis Index Results This Encounter         10/16/2024  0951             Do you live in a place other than your own home?: 0    Any kidney failure, kidney not working well, or seeing a kidney doctor (nephrologist)? If yes, was this for kidney stones or another problem?: 0 No    Any history of chronic (long-term) congestive heart failure (CHF)?: 0 No    Any shortness of breath when resting?: 0 No    In the past five years, have you been diagnosed with or treated for cancer?: No    During the last 3 months has it become difficult for you to remember things or organize your thoughts?: 0 No    Have you lost weight of 10 pounds or more in the past 3 months  without trying?: 0 No    Do you have any loss of appetitie?: 0 No    Getting Around (Mobility): 0 Can get around without help    Eatin Can plan and prepare own meals    Toiletin Can use toilet without any help    Personal Hygiene (Bathing, Hand Washing, Changing Clothes): 0 Can shower or bathe without any help    CARREON Cancer History: Patient does not indicate history of cancer    Total Risk Analysis Index Score Without Cancer: 8    Total Risk Analysis Index Score: 8          Stop Bang Score      Flowsheet Row Pre-Admission Testing from 10/16/2024 in [facility]   Do you snore loudly? 1 filed at 10/16/2024 0950   Do you often feel tired or fatigued after your sleep? 0 filed at 10/16/2024 0950   Has anyone ever observed you stop breathing in your sleep? 0 filed at 10/16/2024 0950   Do you have or are you being treated for high blood pressure? 0 filed at 10/16/2024 0950   Recent BMI (Calculated) 39.6 filed at 10/16/2024 0950   Is BMI greater than 35 kg/m2? 1=Yes filed at 10/16/2024 0950   Age older than 50 years old? 0=No filed at 10/16/2024 0950   Is your neck circumference greater than 17 inches (Male) or 16 inches (Female)? 0 filed at 10/16/2024 0950   Gender - Male 0=No filed at 10/16/2024 0950   STOP-BANG Total Score 2 filed at 10/16/2024 0950                  Assessment and Plan:     HPI 39 y/o female scheduled for laparoscopic gastric sleeve on 10/24/2024 with  Dr. Contreras secondary to morbid obesity.  PMHX includes morbid obesity, CELY.  PAT is consulted today for perioperative risk stratification and optimization.        Neuro:  No neurologic diagnosis or significant findings on chart review, clinical presentation and evaluation.  No grossly apparent neurologic perioperative risk.    Patient is not at increased risk for perioperative CVA      HEENT:  No HEENT diagnosis or significant findings on chart review or clinical presentation and evaluation. No further preoperative  testing/intervention indicated at this time.    Cardiovascular:  Was seen by Cardiology, Dr. Leong for pre op 7/18/2024  No additional testing needed   METS: 8.2  RCRI: 0 points, 3.9%  risk for postoperative MACE   RAMIRO: 0.0% risk for 30 day postoperative MACE  EKG - as above    Pulmonary:  Was seen by Sleep Medicine, Dr. Sprague on 9/5/2024 for CELY  Compliant with CPAP   Knows to bring machine on day of procedure   Stop Bang score is 2 placing patient at low risk for ECLY  ARISCAT: <26 points, 1.6% risk of in-hospital postoperative pulmonary complication  PRODIGY: Low risk for opioid induced respiratory depression      Renal:   No renal diagnosis, however patient is at increase risk for perioperative renal complications secondary to BMI equal to or greater than 30      Endocrine:  No endocrine diagnosis or significant findings on chart review or clinical presentation and evaluation. No further testing or intervention is indicated at this time.    Hematologic:  No hematologic diagnosis, however patient is at an increased risk for DVT  Caprini Score 2, patient at Low risk for perioperative DVT.  Patient provided with VTE education/handout.    Gastrointestinal:   Follows with General Surgery, Dr. Contreras. Last seen 10/14/2024 for pre op visit  Plan for Gastric sleeve   Apfel 4    Infectious disease:   No infectious diagnosis or significant findings on chart review or clinical presentation and evaluation.         Musculoskeletal:   No diagnosis or significant findings on chart review or clinical presentation and evaluation.     Anesthesia/Airway:  PONV      Medication instructions and NPO guidelines reviewed with the patient.  All questions or concerns discussed and addressed.      Labs and EKG ordered per surgeon

## 2024-10-20 LAB
ANABASINE UR-MCNC: <5 NG/ML
COTININE UR-MCNC: <15 NG/ML
NICOTINE UR-MCNC: <15 NG/ML
TRANS-3-OH-COTININE UR-MCNC: <50 NG/ML

## 2024-10-22 ENCOUNTER — ANESTHESIA EVENT (OUTPATIENT)
Dept: OPERATING ROOM | Facility: HOSPITAL | Age: 39
End: 2024-10-22
Payer: COMMERCIAL

## 2024-10-24 ENCOUNTER — HOSPITAL ENCOUNTER (INPATIENT)
Facility: HOSPITAL | Age: 39
LOS: 1 days | Discharge: HOME | End: 2024-10-25
Attending: SURGERY | Admitting: SURGERY
Payer: COMMERCIAL

## 2024-10-24 ENCOUNTER — ANESTHESIA (OUTPATIENT)
Dept: OPERATING ROOM | Facility: HOSPITAL | Age: 39
End: 2024-10-24
Payer: COMMERCIAL

## 2024-10-24 DIAGNOSIS — E66.01 MORBID OBESITY (MULTI): Primary | ICD-10-CM

## 2024-10-24 DIAGNOSIS — R60.0 LOCALIZED EDEMA: ICD-10-CM

## 2024-10-24 LAB
ABO GROUP (TYPE) IN BLOOD: NORMAL
ANTIBODY SCREEN: NORMAL
PREGNANCY TEST URINE, POC: NEGATIVE
PREGNANCY TEST URINE, POC: NEGATIVE
RH FACTOR (ANTIGEN D): NORMAL

## 2024-10-24 PROCEDURE — 3700000001 HC GENERAL ANESTHESIA TIME - INITIAL BASE CHARGE: Performed by: SURGERY

## 2024-10-24 PROCEDURE — 81025 URINE PREGNANCY TEST: CPT | Performed by: SURGERY

## 2024-10-24 PROCEDURE — 9420000001 HC RT PATIENT EDUCATION 5 MIN

## 2024-10-24 PROCEDURE — 2500000004 HC RX 250 GENERAL PHARMACY W/ HCPCS (ALT 636 FOR OP/ED): Performed by: SURGERY

## 2024-10-24 PROCEDURE — 43775 LAP SLEEVE GASTRECTOMY: CPT | Performed by: SURGERY

## 2024-10-24 PROCEDURE — 2780000003 HC OR 278 NO HCPCS: Performed by: SURGERY

## 2024-10-24 PROCEDURE — 2720000007 HC OR 272 NO HCPCS: Performed by: SURGERY

## 2024-10-24 PROCEDURE — 7100000001 HC RECOVERY ROOM TIME - INITIAL BASE CHARGE: Performed by: SURGERY

## 2024-10-24 PROCEDURE — 2500000005 HC RX 250 GENERAL PHARMACY W/O HCPCS: Performed by: SURGERY

## 2024-10-24 PROCEDURE — 3600000004 HC OR TIME - INITIAL BASE CHARGE - PROCEDURE LEVEL FOUR: Performed by: SURGERY

## 2024-10-24 PROCEDURE — 2500000004 HC RX 250 GENERAL PHARMACY W/ HCPCS (ALT 636 FOR OP/ED): Performed by: STUDENT IN AN ORGANIZED HEALTH CARE EDUCATION/TRAINING PROGRAM

## 2024-10-24 PROCEDURE — 7100000002 HC RECOVERY ROOM TIME - EACH INCREMENTAL 1 MINUTE: Performed by: SURGERY

## 2024-10-24 PROCEDURE — 3700000002 HC GENERAL ANESTHESIA TIME - EACH INCREMENTAL 1 MINUTE: Performed by: SURGERY

## 2024-10-24 PROCEDURE — 81025 URINE PREGNANCY TEST: CPT | Performed by: ANESTHESIOLOGY

## 2024-10-24 PROCEDURE — 86901 BLOOD TYPING SEROLOGIC RH(D): CPT | Performed by: SURGERY

## 2024-10-24 PROCEDURE — 96372 THER/PROPH/DIAG INJ SC/IM: CPT | Performed by: SURGERY

## 2024-10-24 PROCEDURE — 0DJ08ZZ INSPECTION OF UPPER INTESTINAL TRACT, VIA NATURAL OR ARTIFICIAL OPENING ENDOSCOPIC: ICD-10-PCS | Performed by: SURGERY

## 2024-10-24 PROCEDURE — 2500000004 HC RX 250 GENERAL PHARMACY W/ HCPCS (ALT 636 FOR OP/ED): Performed by: ANESTHESIOLOGY

## 2024-10-24 PROCEDURE — C1889 IMPLANT/INSERT DEVICE, NOC: HCPCS | Performed by: SURGERY

## 2024-10-24 PROCEDURE — 3600000009 HC OR TIME - EACH INCREMENTAL 1 MINUTE - PROCEDURE LEVEL FOUR: Performed by: SURGERY

## 2024-10-24 PROCEDURE — 36415 COLL VENOUS BLD VENIPUNCTURE: CPT | Performed by: SURGERY

## 2024-10-24 PROCEDURE — 2500000001 HC RX 250 WO HCPCS SELF ADMINISTERED DRUGS (ALT 637 FOR MEDICARE OP): Performed by: SURGERY

## 2024-10-24 PROCEDURE — 2500000004 HC RX 250 GENERAL PHARMACY W/ HCPCS (ALT 636 FOR OP/ED): Performed by: NURSE ANESTHETIST, CERTIFIED REGISTERED

## 2024-10-24 PROCEDURE — 0DB64Z3 EXCISION OF STOMACH, PERCUTANEOUS ENDOSCOPIC APPROACH, VERTICAL: ICD-10-PCS | Performed by: SURGERY

## 2024-10-24 PROCEDURE — 1100000001 HC PRIVATE ROOM DAILY

## 2024-10-24 RX ORDER — HYDROMORPHONE HYDROCHLORIDE 2 MG/ML
INJECTION, SOLUTION INTRAMUSCULAR; INTRAVENOUS; SUBCUTANEOUS AS NEEDED
Status: DISCONTINUED | OUTPATIENT
Start: 2024-10-24 | End: 2024-10-24

## 2024-10-24 RX ORDER — DIPHENHYDRAMINE HYDROCHLORIDE 50 MG/ML
12.5 INJECTION INTRAMUSCULAR; INTRAVENOUS ONCE AS NEEDED
Status: DISCONTINUED | OUTPATIENT
Start: 2024-10-24 | End: 2024-10-24 | Stop reason: HOSPADM

## 2024-10-24 RX ORDER — GABAPENTIN 100 MG/1
100 CAPSULE ORAL 2 TIMES DAILY
Status: DISCONTINUED | OUTPATIENT
Start: 2024-10-24 | End: 2024-10-25 | Stop reason: HOSPADM

## 2024-10-24 RX ORDER — HEPARIN SODIUM 5000 [USP'U]/ML
5000 INJECTION, SOLUTION INTRAVENOUS; SUBCUTANEOUS ONCE
Status: COMPLETED | OUTPATIENT
Start: 2024-10-24 | End: 2024-10-24

## 2024-10-24 RX ORDER — SIMETHICONE 80 MG
80 TABLET,CHEWABLE ORAL EVERY 4 HOURS PRN
Status: DISCONTINUED | OUTPATIENT
Start: 2024-10-24 | End: 2024-10-25 | Stop reason: HOSPADM

## 2024-10-24 RX ORDER — SODIUM CHLORIDE, SODIUM LACTATE, POTASSIUM CHLORIDE, CALCIUM CHLORIDE 600; 310; 30; 20 MG/100ML; MG/100ML; MG/100ML; MG/100ML
20 INJECTION, SOLUTION INTRAVENOUS CONTINUOUS
Status: ACTIVE | OUTPATIENT
Start: 2024-10-24 | End: 2024-10-25

## 2024-10-24 RX ORDER — CETIRIZINE HYDROCHLORIDE 10 MG/1
10 TABLET ORAL DAILY
Status: CANCELLED | OUTPATIENT
Start: 2024-10-24

## 2024-10-24 RX ORDER — NALOXONE HYDROCHLORIDE 0.4 MG/ML
0.2 INJECTION, SOLUTION INTRAMUSCULAR; INTRAVENOUS; SUBCUTANEOUS EVERY 5 MIN PRN
Status: DISCONTINUED | OUTPATIENT
Start: 2024-10-24 | End: 2024-10-25 | Stop reason: HOSPADM

## 2024-10-24 RX ORDER — KETOROLAC TROMETHAMINE 15 MG/ML
15 INJECTION, SOLUTION INTRAMUSCULAR; INTRAVENOUS EVERY 6 HOURS SCHEDULED
Status: DISCONTINUED | OUTPATIENT
Start: 2024-10-24 | End: 2024-10-25 | Stop reason: HOSPADM

## 2024-10-24 RX ORDER — ONDANSETRON HYDROCHLORIDE 2 MG/ML
4 INJECTION, SOLUTION INTRAVENOUS ONCE AS NEEDED
Status: DISCONTINUED | OUTPATIENT
Start: 2024-10-24 | End: 2024-10-24 | Stop reason: HOSPADM

## 2024-10-24 RX ORDER — CEFAZOLIN SODIUM 2 G/100ML
2 INJECTION, SOLUTION INTRAVENOUS ONCE
Status: DISCONTINUED | OUTPATIENT
Start: 2024-10-24 | End: 2024-10-24 | Stop reason: HOSPADM

## 2024-10-24 RX ORDER — ONDANSETRON 4 MG/1
4 TABLET, ORALLY DISINTEGRATING ORAL EVERY 4 HOURS PRN
Status: DISCONTINUED | OUTPATIENT
Start: 2024-10-24 | End: 2024-10-25

## 2024-10-24 RX ORDER — OXYCODONE HYDROCHLORIDE 5 MG/1
5 TABLET ORAL EVERY 4 HOURS PRN
Status: DISCONTINUED | OUTPATIENT
Start: 2024-10-24 | End: 2024-10-24 | Stop reason: HOSPADM

## 2024-10-24 RX ORDER — OXYCODONE HCL 5 MG/5 ML
5 SOLUTION, ORAL ORAL EVERY 4 HOURS PRN
Status: DISCONTINUED | OUTPATIENT
Start: 2024-10-24 | End: 2024-10-25 | Stop reason: HOSPADM

## 2024-10-24 RX ORDER — METHOCARBAMOL 100 MG/ML
1000 INJECTION, SOLUTION INTRAMUSCULAR; INTRAVENOUS ONCE
Status: COMPLETED | OUTPATIENT
Start: 2024-10-24 | End: 2024-10-24

## 2024-10-24 RX ORDER — HYDRALAZINE HYDROCHLORIDE 20 MG/ML
5 INJECTION INTRAMUSCULAR; INTRAVENOUS EVERY 4 HOURS PRN
Status: DISCONTINUED | OUTPATIENT
Start: 2024-10-24 | End: 2024-10-25 | Stop reason: HOSPADM

## 2024-10-24 RX ORDER — CEFAZOLIN 1 G/1
INJECTION, POWDER, FOR SOLUTION INTRAVENOUS AS NEEDED
Status: DISCONTINUED | OUTPATIENT
Start: 2024-10-24 | End: 2024-10-24

## 2024-10-24 RX ORDER — PHENYLEPHRINE HCL IN 0.9% NACL 0.4MG/10ML
SYRINGE (ML) INTRAVENOUS AS NEEDED
Status: DISCONTINUED | OUTPATIENT
Start: 2024-10-24 | End: 2024-10-24

## 2024-10-24 RX ORDER — LIDOCAINE 560 MG/1
1 PATCH PERCUTANEOUS; TOPICAL; TRANSDERMAL EVERY 24 HOURS
Status: DISCONTINUED | OUTPATIENT
Start: 2024-10-24 | End: 2024-10-25 | Stop reason: HOSPADM

## 2024-10-24 RX ORDER — PROPOFOL 10 MG/ML
INJECTION, EMULSION INTRAVENOUS AS NEEDED
Status: DISCONTINUED | OUTPATIENT
Start: 2024-10-24 | End: 2024-10-24

## 2024-10-24 RX ORDER — METOCLOPRAMIDE HYDROCHLORIDE 5 MG/ML
10 INJECTION INTRAMUSCULAR; INTRAVENOUS EVERY 6 HOURS PRN
Status: DISCONTINUED | OUTPATIENT
Start: 2024-10-24 | End: 2024-10-25

## 2024-10-24 RX ORDER — FENTANYL CITRATE 50 UG/ML
INJECTION, SOLUTION INTRAMUSCULAR; INTRAVENOUS AS NEEDED
Status: DISCONTINUED | OUTPATIENT
Start: 2024-10-24 | End: 2024-10-24

## 2024-10-24 RX ORDER — LIDOCAINE HYDROCHLORIDE 20 MG/ML
INJECTION, SOLUTION INFILTRATION; PERINEURAL AS NEEDED
Status: DISCONTINUED | OUTPATIENT
Start: 2024-10-24 | End: 2024-10-24

## 2024-10-24 RX ORDER — ONDANSETRON HYDROCHLORIDE 2 MG/ML
4 INJECTION, SOLUTION INTRAVENOUS EVERY 8 HOURS PRN
Status: DISCONTINUED | OUTPATIENT
Start: 2024-10-24 | End: 2024-10-24

## 2024-10-24 RX ORDER — ACETAMINOPHEN 10 MG/ML
1000 INJECTION, SOLUTION INTRAVENOUS EVERY 6 HOURS SCHEDULED
Status: DISCONTINUED | OUTPATIENT
Start: 2024-10-24 | End: 2024-10-25 | Stop reason: HOSPADM

## 2024-10-24 RX ORDER — PANTOPRAZOLE SODIUM 40 MG/1
40 TABLET, DELAYED RELEASE ORAL
Status: DISCONTINUED | OUTPATIENT
Start: 2024-10-25 | End: 2024-10-25 | Stop reason: HOSPADM

## 2024-10-24 RX ORDER — CEFAZOLIN SODIUM 2 G/100ML
2 INJECTION, SOLUTION INTRAVENOUS EVERY 8 HOURS
Status: COMPLETED | OUTPATIENT
Start: 2024-10-24 | End: 2024-10-25

## 2024-10-24 RX ORDER — ONDANSETRON HYDROCHLORIDE 2 MG/ML
INJECTION, SOLUTION INTRAVENOUS AS NEEDED
Status: DISCONTINUED | OUTPATIENT
Start: 2024-10-24 | End: 2024-10-24

## 2024-10-24 RX ORDER — BISACODYL 5 MG
10 TABLET, DELAYED RELEASE (ENTERIC COATED) ORAL DAILY PRN
Status: DISCONTINUED | OUTPATIENT
Start: 2024-10-24 | End: 2024-10-25 | Stop reason: HOSPADM

## 2024-10-24 RX ORDER — SCOLOPAMINE TRANSDERMAL SYSTEM 1 MG/1
1 PATCH, EXTENDED RELEASE TRANSDERMAL ONCE
Status: DISCONTINUED | OUTPATIENT
Start: 2024-10-24 | End: 2024-10-25 | Stop reason: HOSPADM

## 2024-10-24 RX ORDER — ESMOLOL HYDROCHLORIDE 10 MG/ML
INJECTION, SOLUTION INTRAVENOUS CONTINUOUS PRN
Status: DISCONTINUED | OUTPATIENT
Start: 2024-10-24 | End: 2024-10-24

## 2024-10-24 RX ORDER — METOCLOPRAMIDE 5 MG/1
10 TABLET ORAL EVERY 6 HOURS PRN
Status: DISCONTINUED | OUTPATIENT
Start: 2024-10-24 | End: 2024-10-25

## 2024-10-24 RX ORDER — ALBUTEROL SULFATE 0.83 MG/ML
2.5 SOLUTION RESPIRATORY (INHALATION) ONCE AS NEEDED
Status: DISCONTINUED | OUTPATIENT
Start: 2024-10-24 | End: 2024-10-24 | Stop reason: HOSPADM

## 2024-10-24 RX ORDER — DROPERIDOL 2.5 MG/ML
0.62 INJECTION, SOLUTION INTRAMUSCULAR; INTRAVENOUS ONCE AS NEEDED
Status: DISCONTINUED | OUTPATIENT
Start: 2024-10-24 | End: 2024-10-24 | Stop reason: HOSPADM

## 2024-10-24 RX ORDER — OXYCODONE HYDROCHLORIDE 10 MG/1
10 TABLET ORAL EVERY 4 HOURS PRN
Status: DISCONTINUED | OUTPATIENT
Start: 2024-10-24 | End: 2024-10-25 | Stop reason: HOSPADM

## 2024-10-24 RX ORDER — ROCURONIUM BROMIDE 10 MG/ML
INJECTION, SOLUTION INTRAVENOUS AS NEEDED
Status: DISCONTINUED | OUTPATIENT
Start: 2024-10-24 | End: 2024-10-24

## 2024-10-24 RX ORDER — ESOMEPRAZOLE MAGNESIUM 40 MG/1
40 GRANULE, DELAYED RELEASE ORAL
Status: DISCONTINUED | OUTPATIENT
Start: 2024-10-25 | End: 2024-10-25 | Stop reason: HOSPADM

## 2024-10-24 RX ORDER — SODIUM CHLORIDE, SODIUM LACTATE, POTASSIUM CHLORIDE, CALCIUM CHLORIDE 600; 310; 30; 20 MG/100ML; MG/100ML; MG/100ML; MG/100ML
150 INJECTION, SOLUTION INTRAVENOUS CONTINUOUS
Status: DISCONTINUED | OUTPATIENT
Start: 2024-10-24 | End: 2024-10-25 | Stop reason: HOSPADM

## 2024-10-24 RX ORDER — MEPERIDINE HYDROCHLORIDE 25 MG/ML
12.5 INJECTION INTRAMUSCULAR; INTRAVENOUS; SUBCUTANEOUS EVERY 10 MIN PRN
Status: DISCONTINUED | OUTPATIENT
Start: 2024-10-24 | End: 2024-10-24 | Stop reason: HOSPADM

## 2024-10-24 RX ORDER — SODIUM CHLORIDE 0.9 G/100ML
IRRIGANT IRRIGATION AS NEEDED
Status: DISCONTINUED | OUTPATIENT
Start: 2024-10-24 | End: 2024-10-24 | Stop reason: HOSPADM

## 2024-10-24 RX ORDER — ONDANSETRON HYDROCHLORIDE 2 MG/ML
4 INJECTION, SOLUTION INTRAVENOUS EVERY 4 HOURS PRN
Status: DISCONTINUED | OUTPATIENT
Start: 2024-10-24 | End: 2024-10-25

## 2024-10-24 RX ORDER — ONDANSETRON 4 MG/1
4 TABLET, ORALLY DISINTEGRATING ORAL EVERY 8 HOURS PRN
Status: DISCONTINUED | OUTPATIENT
Start: 2024-10-24 | End: 2024-10-24

## 2024-10-24 RX ORDER — SODIUM CHLORIDE, SODIUM LACTATE, POTASSIUM CHLORIDE, CALCIUM CHLORIDE 600; 310; 30; 20 MG/100ML; MG/100ML; MG/100ML; MG/100ML
100 INJECTION, SOLUTION INTRAVENOUS CONTINUOUS
Status: ACTIVE | OUTPATIENT
Start: 2024-10-24 | End: 2024-10-25

## 2024-10-24 RX ORDER — HEPARIN SODIUM 5000 [USP'U]/ML
5000 INJECTION, SOLUTION INTRAVENOUS; SUBCUTANEOUS EVERY 8 HOURS
Status: DISCONTINUED | OUTPATIENT
Start: 2024-10-24 | End: 2024-10-25 | Stop reason: HOSPADM

## 2024-10-24 RX ORDER — GABAPENTIN 300 MG/1
600 CAPSULE ORAL ONCE
Status: COMPLETED | OUTPATIENT
Start: 2024-10-24 | End: 2024-10-24

## 2024-10-24 RX ORDER — PANTOPRAZOLE SODIUM 40 MG/10ML
40 INJECTION, POWDER, LYOPHILIZED, FOR SOLUTION INTRAVENOUS
Status: DISCONTINUED | OUTPATIENT
Start: 2024-10-25 | End: 2024-10-25 | Stop reason: HOSPADM

## 2024-10-24 RX ORDER — MIDAZOLAM HYDROCHLORIDE 1 MG/ML
INJECTION INTRAMUSCULAR; INTRAVENOUS AS NEEDED
Status: DISCONTINUED | OUTPATIENT
Start: 2024-10-24 | End: 2024-10-24

## 2024-10-24 RX ORDER — METOPROLOL TARTRATE 1 MG/ML
INJECTION, SOLUTION INTRAVENOUS AS NEEDED
Status: DISCONTINUED | OUTPATIENT
Start: 2024-10-24 | End: 2024-10-24

## 2024-10-24 SDOH — ECONOMIC STABILITY: FOOD INSECURITY: WITHIN THE PAST 12 MONTHS, THE FOOD YOU BOUGHT JUST DIDN'T LAST AND YOU DIDN'T HAVE MONEY TO GET MORE.: NEVER TRUE

## 2024-10-24 SDOH — ECONOMIC STABILITY: FOOD INSECURITY: WITHIN THE PAST 12 MONTHS, YOU WORRIED THAT YOUR FOOD WOULD RUN OUT BEFORE YOU GOT THE MONEY TO BUY MORE.: NEVER TRUE

## 2024-10-24 SDOH — ECONOMIC STABILITY: TRANSPORTATION INSECURITY: IN THE PAST 12 MONTHS, HAS LACK OF TRANSPORTATION KEPT YOU FROM MEDICAL APPOINTMENTS OR FROM GETTING MEDICATIONS?: NO

## 2024-10-24 SDOH — SOCIAL STABILITY: SOCIAL INSECURITY: DO YOU FEEL UNSAFE GOING BACK TO THE PLACE WHERE YOU ARE LIVING?: NO

## 2024-10-24 SDOH — ECONOMIC STABILITY: INCOME INSECURITY: IN THE PAST 12 MONTHS HAS THE ELECTRIC, GAS, OIL, OR WATER COMPANY THREATENED TO SHUT OFF SERVICES IN YOUR HOME?: NO

## 2024-10-24 SDOH — SOCIAL STABILITY: SOCIAL INSECURITY: WITHIN THE LAST YEAR, HAVE YOU BEEN HUMILIATED OR EMOTIONALLY ABUSED IN OTHER WAYS BY YOUR PARTNER OR EX-PARTNER?: NO

## 2024-10-24 SDOH — SOCIAL STABILITY: SOCIAL INSECURITY: HAVE YOU HAD THOUGHTS OF HARMING ANYONE ELSE?: NO

## 2024-10-24 SDOH — SOCIAL STABILITY: SOCIAL INSECURITY: WITHIN THE LAST YEAR, HAVE YOU BEEN AFRAID OF YOUR PARTNER OR EX-PARTNER?: NO

## 2024-10-24 SDOH — SOCIAL STABILITY: SOCIAL INSECURITY: WERE YOU ABLE TO COMPLETE ALL THE BEHAVIORAL HEALTH SCREENINGS?: YES

## 2024-10-24 SDOH — ECONOMIC STABILITY: HOUSING INSECURITY: IN THE PAST 12 MONTHS, HOW MANY TIMES HAVE YOU MOVED WHERE YOU WERE LIVING?: 0

## 2024-10-24 SDOH — SOCIAL STABILITY: SOCIAL INSECURITY: ARE THERE ANY APPARENT SIGNS OF INJURIES/BEHAVIORS THAT COULD BE RELATED TO ABUSE/NEGLECT?: NO

## 2024-10-24 SDOH — SOCIAL STABILITY: SOCIAL INSECURITY
WITHIN THE LAST YEAR, HAVE YOU BEEN KICKED, HIT, SLAPPED, OR OTHERWISE PHYSICALLY HURT BY YOUR PARTNER OR EX-PARTNER?: NO

## 2024-10-24 SDOH — SOCIAL STABILITY: SOCIAL INSECURITY
WITHIN THE LAST YEAR, HAVE YOU BEEN RAPED OR FORCED TO HAVE ANY KIND OF SEXUAL ACTIVITY BY YOUR PARTNER OR EX-PARTNER?: NO

## 2024-10-24 SDOH — ECONOMIC STABILITY: HOUSING INSECURITY: IN THE LAST 12 MONTHS, WAS THERE A TIME WHEN YOU WERE NOT ABLE TO PAY THE MORTGAGE OR RENT ON TIME?: NO

## 2024-10-24 SDOH — ECONOMIC STABILITY: HOUSING INSECURITY: AT ANY TIME IN THE PAST 12 MONTHS, WERE YOU HOMELESS OR LIVING IN A SHELTER (INCLUDING NOW)?: NO

## 2024-10-24 SDOH — SOCIAL STABILITY: SOCIAL INSECURITY: ARE YOU OR HAVE YOU BEEN THREATENED OR ABUSED PHYSICALLY, EMOTIONALLY, OR SEXUALLY BY ANYONE?: NO

## 2024-10-24 SDOH — HEALTH STABILITY: MENTAL HEALTH: CURRENT SMOKER: 0

## 2024-10-24 SDOH — SOCIAL STABILITY: SOCIAL INSECURITY: DOES ANYONE TRY TO KEEP YOU FROM HAVING/CONTACTING OTHER FRIENDS OR DOING THINGS OUTSIDE YOUR HOME?: NO

## 2024-10-24 SDOH — ECONOMIC STABILITY: FOOD INSECURITY: HOW HARD IS IT FOR YOU TO PAY FOR THE VERY BASICS LIKE FOOD, HOUSING, MEDICAL CARE, AND HEATING?: NOT HARD AT ALL

## 2024-10-24 SDOH — SOCIAL STABILITY: SOCIAL INSECURITY: HAS ANYONE EVER THREATENED TO HURT YOUR FAMILY OR YOUR PETS?: NO

## 2024-10-24 SDOH — SOCIAL STABILITY: SOCIAL INSECURITY: DO YOU FEEL ANYONE HAS EXPLOITED OR TAKEN ADVANTAGE OF YOU FINANCIALLY OR OF YOUR PERSONAL PROPERTY?: NO

## 2024-10-24 SDOH — SOCIAL STABILITY: SOCIAL INSECURITY: ABUSE: ADULT

## 2024-10-24 ASSESSMENT — ACTIVITIES OF DAILY LIVING (ADL)
HEARING - RIGHT EAR: FUNCTIONAL
HEARING - RIGHT EAR: FUNCTIONAL
WALKS IN HOME: INDEPENDENT
JUDGMENT_ADEQUATE_SAFELY_COMPLETE_DAILY_ACTIVITIES: YES
JUDGMENT_ADEQUATE_SAFELY_COMPLETE_DAILY_ACTIVITIES: YES
BATHING: INDEPENDENT
PATIENT'S MEMORY ADEQUATE TO SAFELY COMPLETE DAILY ACTIVITIES?: YES
HEARING - LEFT EAR: FUNCTIONAL
PATIENT'S MEMORY ADEQUATE TO SAFELY COMPLETE DAILY ACTIVITIES?: YES
ADEQUATE_TO_COMPLETE_ADL: YES
BATHING: INDEPENDENT
GROOMING: INDEPENDENT
LACK_OF_TRANSPORTATION: NO
FEEDING YOURSELF: INDEPENDENT
DRESSING YOURSELF: INDEPENDENT
WALKS IN HOME: INDEPENDENT
TOILETING: NEEDS ASSISTANCE
HEARING - LEFT EAR: FUNCTIONAL
DRESSING YOURSELF: INDEPENDENT
FEEDING YOURSELF: INDEPENDENT
TOILETING: NEEDS ASSISTANCE
ADEQUATE_TO_COMPLETE_ADL: YES

## 2024-10-24 ASSESSMENT — COGNITIVE AND FUNCTIONAL STATUS - GENERAL
PATIENT BASELINE BEDBOUND: NO
DAILY ACTIVITIY SCORE: 24
MOVING FROM LYING ON BACK TO SITTING ON SIDE OF FLAT BED WITH BEDRAILS: A LITTLE
DAILY ACTIVITIY SCORE: 23
TURNING FROM BACK TO SIDE WHILE IN FLAT BAD: A LITTLE
MOBILITY SCORE: 19
TOILETING: A LITTLE
CLIMB 3 TO 5 STEPS WITH RAILING: A LITTLE
WALKING IN HOSPITAL ROOM: A LITTLE
MOVING TO AND FROM BED TO CHAIR: A LITTLE
MOBILITY SCORE: 24

## 2024-10-24 ASSESSMENT — LIFESTYLE VARIABLES
HOW OFTEN DO YOU HAVE A DRINK CONTAINING ALCOHOL: NEVER
HOW OFTEN DO YOU HAVE 6 OR MORE DRINKS ON ONE OCCASION: NEVER
AUDIT-C TOTAL SCORE: 0
AUDIT-C TOTAL SCORE: 0
SKIP TO QUESTIONS 9-10: 1
HOW MANY STANDARD DRINKS CONTAINING ALCOHOL DO YOU HAVE ON A TYPICAL DAY: PATIENT DOES NOT DRINK

## 2024-10-24 ASSESSMENT — PAIN SCALES - GENERAL
PAINLEVEL_OUTOF10: 8
PAIN_LEVEL: 5
PAINLEVEL_OUTOF10: 2
PAINLEVEL_OUTOF10: 5 - MODERATE PAIN
PAINLEVEL_OUTOF10: 0 - NO PAIN
PAINLEVEL_OUTOF10: 7
PAINLEVEL_OUTOF10: 8

## 2024-10-24 ASSESSMENT — COLUMBIA-SUICIDE SEVERITY RATING SCALE - C-SSRS
2. HAVE YOU ACTUALLY HAD ANY THOUGHTS OF KILLING YOURSELF?: NO
1. IN THE PAST MONTH, HAVE YOU WISHED YOU WERE DEAD OR WISHED YOU COULD GO TO SLEEP AND NOT WAKE UP?: NO
6. HAVE YOU EVER DONE ANYTHING, STARTED TO DO ANYTHING, OR PREPARED TO DO ANYTHING TO END YOUR LIFE?: NO

## 2024-10-24 ASSESSMENT — PAIN - FUNCTIONAL ASSESSMENT
PAIN_FUNCTIONAL_ASSESSMENT: 0-10

## 2024-10-24 ASSESSMENT — PATIENT HEALTH QUESTIONNAIRE - PHQ9
2. FEELING DOWN, DEPRESSED OR HOPELESS: NOT AT ALL
1. LITTLE INTEREST OR PLEASURE IN DOING THINGS: NOT AT ALL
SUM OF ALL RESPONSES TO PHQ9 QUESTIONS 1 & 2: 0

## 2024-10-24 ASSESSMENT — PAIN DESCRIPTION - DESCRIPTORS: DESCRIPTORS: SHARP

## 2024-10-24 NOTE — BRIEF OP NOTE
Date: 10/24/2024  OR Location: [facility] OR    Name: Antionette Felix : 1985, Age: 38 y.o., MRN: [id number], Sex: female    Diagnosis  Pre-op Diagnosis      * Morbid obesity (Multi) [E66.01] Post-op Diagnosis     * Morbid obesity (Multi) [E66.01]     Procedures  GASTRIC SLEEVE LAPAROSCOPIC  58303 - NV LAPS GSTRC RSTRICTIV PX LONGITUDINAL GASTRECTOMY    GASTRIC SLEEVE LAPAROSCOPIC  96994 - NV LAPS GSTRC RSTRICTIV PX LONGITUDINAL GASTRECTOMY    NV EGD TRANSORAL BIOPSY SINGLE/MULTIPLE [21743]  Surgeons      * Machelle Contreras - Primary    Resident/Fellow/Other Assistant:  Surgeons and Role:  * No surgeons found with a matching role *    Procedure Summary  Anesthesia: General  ASA: III  Anesthesia Staff: Anesthesiologist: Lazarus Perera MD  CRNA: MARYANN Griffith-CRNA  Estimated Blood Loss: 10mL  Intra-op Medications:   Administrations occurring from 0705 to 0920 on 10/24/24:   Medication Name Total Dose   sodium chloride 0.9 % irrigation solution 500 mL   BUPivacaine-EPINEPHrine (Marcaine w/EPI) 30 mL in sodium chloride 0.9% 30 mL syringe 60 mL   ceFAZolin (Ancef) vial 1 g 2 g   dexAMETHasone (Decadron) injection 4 mg/mL 8 mg   esmolol (Brevibloc) 2500 mg in sodium chloride 250 mL (10 mg/mL) infusion (premix) 315.06 mg   fentaNYL (Sublimaze) injection 50 mcg/mL 100 mcg   lactated Ringer's infusion 41.99 mL   lidocaine (Xylocaine) injection 2 % 100 mg   metoprolol tartrate (Lopressor) injection 5 mg   midazolam PF (Versed) injection 1 mg/mL 2 mg   ondansetron (Zofran) 2 mg/mL injection 4 mg   phenylephrine 40 mcg/mL syringe 10 mL 80 mcg   propofol (Diprivan) injection 10 mg/mL 200 mg   rocuronium (ZeMuron) 50 mg/5 mL injection 90 mg   scopolamine (Transderm-Scop) patch 1 patch 1 patch   sugammadex (Bridion) 200 mg/2 mL injection 200 mg   heparin (porcine) injection 5,000 Units 5,000 Units              Anesthesia Record               Intraprocedure I/O Totals          Intake    Esmolol Drip 0.00 mL    The total shown  is the total volume documented since Anesthesia Start was filed.    lactated Ringer's infusion 800.00 mL    Total Intake 800 mL       Output    Est. Blood Loss 10 mL    Total Output 10 mL       Net    Net Volume 790 mL          Specimen:   ID Type Source Tests Collected by Time   1 : GASTRIC REMNANT Tissue STOMACH SLEEVE RESECTION SURGICAL PATHOLOGY EXAM Machelle Contreras MD MPH 10/24/2024 0852        Staff:   Scrub Person: Prieto  Scrub Person: Tiffanie  RNFA: Sanya  Circulator: Crys Blankenship Circulator: Samia          Findings: Adhesions at the posterior stomach     Complications:  None; patient tolerated the procedure well.     Disposition: PACU - hemodynamically stable.  Condition: stable  Specimens Collected:   ID Type Source Tests Collected by Time   1 : GASTRIC REMNANT Tissue STOMACH SLEEVE RESECTION SURGICAL PATHOLOGY EXAM Machelle Contreras MD MPH 10/24/2024 0806     Attending Attestation: I was present and scrubbed for the entire procedure.    Machelle Contreras  Phone Number: [phone]

## 2024-10-24 NOTE — ANESTHESIA POSTPROCEDURE EVALUATION
Patient: Antionette Felix    Procedure Summary       Date: 10/24/24 Room / Location: [facility] OR 04 / [facility] OR    Anesthesia Start: 0738 Anesthesia Stop: 0925    Procedure: GASTRIC SLEEVE LAPAROSCOPIC Diagnosis:       Morbid obesity (Multi)      (Morbid obesity (Multi) [E66.01])    Surgeons: Machelle Contreras MD MPH Responsible Provider: Lazarus Perera MD    Anesthesia Type: general ASA Status: 3            Anesthesia Type: general    Vitals Value Taken Time   /89 10/24/24 1016   Temp 36 °C (96.8 °F) 10/24/24 0920   Pulse 87 10/24/24 1017   Resp 20 10/24/24 1005   SpO2 98 % 10/24/24 1017   Vitals shown include unfiled device data.    Anesthesia Post Evaluation    Patient location during evaluation: PACU  Patient participation: complete - patient participated  Level of consciousness: awake and alert  Pain score: 5  Pain management: adequate  Multimodal analgesia pain management approach  Airway patency: patent  Cardiovascular status: acceptable  Respiratory status: acceptable  Hydration status: acceptable  Postoperative Nausea and Vomiting: none        No notable events documented.

## 2024-10-24 NOTE — ANESTHESIA PREPROCEDURE EVALUATION
Patient: Antionette Felix    Procedure Information       Date/Time: 10/24/24 0705    Procedure: GASTRIC SLEEVE LAPAROSCOPIC    Location: [facility] OR 04 / [facility] OR    Surgeons: Machelle Contreras MD MPH            Relevant Problems   Pulmonary   (+) CELY (obstructive sleep apnea)      Endocrine   (+) Morbid obesity (Multi)      Hematology   (+) Anemia      Skin   (+) Eczema       Clinical information reviewed:   Tobacco  Allergies  Meds   Med Hx  Surg Hx  OB Status  Fam Hx  Soc   Hx        NPO Detail:  NPO/Void Status  Date of Last Liquid: 10/24/24  Time of Last Liquid: 0300  Date of Last Solid: 10/22/24  Last Intake Type: Clear fluids         Physical Exam    Airway  Mallampati: II  TM distance: >3 FB  Neck ROM: full     Cardiovascular    Dental    Pulmonary    Abdominal            Anesthesia Plan    History of general anesthesia?: yes  History of complications of general anesthesia?: no    ASA 3     general     The patient is not a current smoker.    Anesthetic plan and risks discussed with patient.  Use of blood products discussed with patient who consented to blood products.

## 2024-10-24 NOTE — PROGRESS NOTES
10/24/24 1231   Discharge Planning   Living Arrangements Children;Spouse/significant other   Support Systems Spouse/significant other;Family members;Friends/neighbors;Parent   Assistance Needed Patient is A&O X3, on room air at baseline but uses a CPAP at HS, is independent with ADLs and uses no assistive devices at home. Patient is able to transport self to appointments. Mother or significant other will transport patient home at time of DC. Patient is requesting an abdominal binder prior to DC- bedside nurse is aware of request for abdominal binder.   Type of Residence Private residence   Number of Stairs to Enter Residence 3  (into her home)   Number of Stairs Within Residence 13  (to bedroom but will stay in living room in recliner chair to avoid stairs after surgery)   Do you have animals or pets at home? Yes   Type of Animals or Pets Dogs   Who is requesting discharge planning? Provider   Home or Post Acute Services None   Expected Discharge Disposition Home   Does the patient need discharge transport arranged? No

## 2024-10-24 NOTE — ANESTHESIA PROCEDURE NOTES
Airway  Date/Time: 10/24/2024 7:45 AM  Urgency: elective    Airway not difficult    Staffing  Performed: CRNA   Authorized by: Lazarus Perera MD    Performed by: MARYANN Griffith-ANDREAS  Patient location during procedure: OR    Indications and Patient Condition  Indications for airway management: anesthesia  Spontaneous Ventilation: absent  Sedation level: deep  Preoxygenated: yes  Patient position: sniffing  Mask difficulty assessment: 2 - vent by mask + OA or adjuvant +/- NMBA    Final Airway Details  Final airway type: endotracheal airway      Successful airway: ETT  Cuffed: yes   Successful intubation technique: video laryngoscopy  Facilitating devices/methods: intubating stylet  Blade: Elie  Blade size: #4  ETT size (mm): 7.0  Cormack-Lehane Classification: grade I - full view of glottis  Placement verified by: chest auscultation and capnometry   Measured from: lips  ETT to lips (cm): 22  Number of attempts at approach: 1  Number of other approaches attempted: 0

## 2024-10-25 ENCOUNTER — APPOINTMENT (OUTPATIENT)
Dept: VASCULAR MEDICINE | Facility: HOSPITAL | Age: 39
End: 2024-10-25
Payer: COMMERCIAL

## 2024-10-25 ENCOUNTER — APPOINTMENT (OUTPATIENT)
Dept: RADIOLOGY | Facility: HOSPITAL | Age: 39
End: 2024-10-25
Payer: COMMERCIAL

## 2024-10-25 ENCOUNTER — PHARMACY VISIT (OUTPATIENT)
Dept: PHARMACY | Facility: CLINIC | Age: 39
End: 2024-10-25
Payer: MEDICAID

## 2024-10-25 VITALS
BODY MASS INDEX: 39.38 KG/M2 | DIASTOLIC BLOOD PRESSURE: 75 MMHG | HEART RATE: 63 BPM | RESPIRATION RATE: 16 BRPM | OXYGEN SATURATION: 100 % | WEIGHT: 265.88 LBS | HEIGHT: 69 IN | TEMPERATURE: 97.5 F | SYSTOLIC BLOOD PRESSURE: 121 MMHG

## 2024-10-25 LAB
ALBUMIN SERPL BCP-MCNC: 4 G/DL (ref 3.4–5)
ALP SERPL-CCNC: 42 U/L (ref 33–110)
ALT SERPL W P-5'-P-CCNC: 29 U/L (ref 7–45)
ANION GAP SERPL CALC-SCNC: 14 MMOL/L (ref 10–20)
AST SERPL W P-5'-P-CCNC: 27 U/L (ref 9–39)
BASOPHILS # BLD AUTO: 0.03 X10*3/UL (ref 0–0.1)
BASOPHILS NFR BLD AUTO: 0.2 %
BILIRUB SERPL-MCNC: 0.3 MG/DL (ref 0–1.2)
BUN SERPL-MCNC: 10 MG/DL (ref 6–23)
CALCIUM SERPL-MCNC: 9 MG/DL (ref 8.6–10.3)
CHLORIDE SERPL-SCNC: 101 MMOL/L (ref 98–107)
CO2 SERPL-SCNC: 25 MMOL/L (ref 21–32)
CREAT SERPL-MCNC: 0.77 MG/DL (ref 0.5–1.05)
EGFRCR SERPLBLD CKD-EPI 2021: >90 ML/MIN/1.73M*2
EOSINOPHIL # BLD AUTO: 0 X10*3/UL (ref 0–0.7)
EOSINOPHIL NFR BLD AUTO: 0 %
ERYTHROCYTE [DISTWIDTH] IN BLOOD BY AUTOMATED COUNT: 12.4 % (ref 11.5–14.5)
GLUCOSE SERPL-MCNC: 90 MG/DL (ref 74–99)
HCT VFR BLD AUTO: 37.2 % (ref 36–46)
HGB BLD-MCNC: 12.2 G/DL (ref 12–16)
IMM GRANULOCYTES # BLD AUTO: 0.04 X10*3/UL (ref 0–0.7)
IMM GRANULOCYTES NFR BLD AUTO: 0.3 % (ref 0–0.9)
LYMPHOCYTES # BLD AUTO: 3.38 X10*3/UL (ref 1.2–4.8)
LYMPHOCYTES NFR BLD AUTO: 25.4 %
MCH RBC QN AUTO: 29.8 PG (ref 26–34)
MCHC RBC AUTO-ENTMCNC: 32.8 G/DL (ref 32–36)
MCV RBC AUTO: 91 FL (ref 80–100)
MONOCYTES # BLD AUTO: 1.03 X10*3/UL (ref 0.1–1)
MONOCYTES NFR BLD AUTO: 7.7 %
NEUTROPHILS # BLD AUTO: 8.83 X10*3/UL (ref 1.2–7.7)
NEUTROPHILS NFR BLD AUTO: 66.4 %
NRBC BLD-RTO: 0 /100 WBCS (ref 0–0)
PLATELET # BLD AUTO: 334 X10*3/UL (ref 150–450)
POTASSIUM SERPL-SCNC: 4 MMOL/L (ref 3.5–5.3)
PROT SERPL-MCNC: 6.9 G/DL (ref 6.4–8.2)
RBC # BLD AUTO: 4.09 X10*6/UL (ref 4–5.2)
SODIUM SERPL-SCNC: 136 MMOL/L (ref 136–145)
WBC # BLD AUTO: 13.3 X10*3/UL (ref 4.4–11.3)

## 2024-10-25 PROCEDURE — 2550000001 HC RX 255 CONTRASTS: Performed by: SURGERY

## 2024-10-25 PROCEDURE — 85025 COMPLETE CBC W/AUTO DIFF WBC: CPT | Performed by: SURGERY

## 2024-10-25 PROCEDURE — 93970 EXTREMITY STUDY: CPT | Performed by: INTERNAL MEDICINE

## 2024-10-25 PROCEDURE — 43775 LAP SLEEVE GASTRECTOMY: CPT | Performed by: SURGERY

## 2024-10-25 PROCEDURE — 80053 COMPREHEN METABOLIC PANEL: CPT | Performed by: SURGERY

## 2024-10-25 PROCEDURE — RXMED WILLOW AMBULATORY MEDICATION CHARGE

## 2024-10-25 PROCEDURE — 93970 EXTREMITY STUDY: CPT

## 2024-10-25 PROCEDURE — 2500000001 HC RX 250 WO HCPCS SELF ADMINISTERED DRUGS (ALT 637 FOR MEDICARE OP): Performed by: SURGERY

## 2024-10-25 PROCEDURE — 74220 X-RAY XM ESOPHAGUS 1CNTRST: CPT

## 2024-10-25 PROCEDURE — 2500000004 HC RX 250 GENERAL PHARMACY W/ HCPCS (ALT 636 FOR OP/ED): Performed by: SURGERY

## 2024-10-25 PROCEDURE — 74220 X-RAY XM ESOPHAGUS 1CNTRST: CPT | Performed by: RADIOLOGY

## 2024-10-25 PROCEDURE — 36415 COLL VENOUS BLD VENIPUNCTURE: CPT | Performed by: SURGERY

## 2024-10-25 PROCEDURE — 94760 N-INVAS EAR/PLS OXIMETRY 1: CPT

## 2024-10-25 RX ORDER — METOCLOPRAMIDE 5 MG/1
10 TABLET ORAL EVERY 6 HOURS PRN
Status: DISCONTINUED | OUTPATIENT
Start: 2024-10-25 | End: 2024-10-25 | Stop reason: HOSPADM

## 2024-10-25 RX ORDER — ACETAMINOPHEN 160 MG/5ML
650 LIQUID ORAL EVERY 4 HOURS PRN
Qty: 120 ML | Refills: 0 | Status: SHIPPED | OUTPATIENT
Start: 2024-10-25

## 2024-10-25 RX ORDER — OXYCODONE HCL 5 MG/5 ML
5 SOLUTION, ORAL ORAL EVERY 6 HOURS PRN
Qty: 140 ML | Refills: 0 | Status: SHIPPED | OUTPATIENT
Start: 2024-10-25

## 2024-10-25 RX ORDER — METOCLOPRAMIDE HYDROCHLORIDE 5 MG/ML
10 INJECTION INTRAMUSCULAR; INTRAVENOUS EVERY 6 HOURS PRN
Status: DISCONTINUED | OUTPATIENT
Start: 2024-10-25 | End: 2024-10-25 | Stop reason: HOSPADM

## 2024-10-25 RX ORDER — ONDANSETRON 4 MG/1
4 TABLET, ORALLY DISINTEGRATING ORAL EVERY 6 HOURS
Status: DISCONTINUED | OUTPATIENT
Start: 2024-10-25 | End: 2024-10-25 | Stop reason: HOSPADM

## 2024-10-25 RX ORDER — DIATRIZOATE MEGLUMINE AND DIATRIZOATE SODIUM 660; 100 MG/ML; MG/ML
30 SOLUTION ORAL; RECTAL ONCE
Status: COMPLETED | OUTPATIENT
Start: 2024-10-25 | End: 2024-10-25

## 2024-10-25 RX ORDER — ONDANSETRON HYDROCHLORIDE 2 MG/ML
4 INJECTION, SOLUTION INTRAVENOUS EVERY 6 HOURS
Status: DISCONTINUED | OUTPATIENT
Start: 2024-10-25 | End: 2024-10-25 | Stop reason: HOSPADM

## 2024-10-25 RX ORDER — ONDANSETRON 4 MG/1
4 TABLET, ORALLY DISINTEGRATING ORAL EVERY 8 HOURS PRN
Qty: 30 TABLET | Refills: 0 | Status: SHIPPED | OUTPATIENT
Start: 2024-10-25

## 2024-10-25 RX ORDER — OMEPRAZOLE 40 MG/1
40 CAPSULE, DELAYED RELEASE ORAL
Qty: 30 CAPSULE | Refills: 1 | Status: SHIPPED | OUTPATIENT
Start: 2024-10-25

## 2024-10-25 ASSESSMENT — COGNITIVE AND FUNCTIONAL STATUS - GENERAL
MOBILITY SCORE: 24
DAILY ACTIVITIY SCORE: 24

## 2024-10-25 ASSESSMENT — PAIN SCALES - GENERAL
PAINLEVEL_OUTOF10: 3
PAINLEVEL_OUTOF10: 5 - MODERATE PAIN

## 2024-10-25 ASSESSMENT — PAIN - FUNCTIONAL ASSESSMENT
PAIN_FUNCTIONAL_ASSESSMENT: 0-10
PAIN_FUNCTIONAL_ASSESSMENT: 0-10

## 2024-10-25 NOTE — CARE PLAN
Problem: Pain - Adult  Goal: Verbalizes/displays adequate comfort level or baseline comfort level  Outcome: Progressing     Problem: Safety - Adult  Goal: Free from fall injury  Outcome: Progressing     Problem: Discharge Planning  Goal: Discharge to home or other facility with appropriate resources  Outcome: Progressing     Problem: Chronic Conditions and Co-morbidities  Goal: Patient's chronic conditions and co-morbidity symptoms are monitored and maintained or improved  Outcome: Progressing   The patient's goals for the shift include      The clinical goals for the shift include pain and nausea control and ambulation

## 2024-10-25 NOTE — PROGRESS NOTES
10/25/24 1202   Discharge Planning   Living Arrangements Children;Spouse/significant other   Support Systems Spouse/significant other;Family members;Friends/neighbors;Parent   Assistance Needed Patient is A&O X3, on room air at baseline but uses a CPAP at HS, is independent with ADLs and uses no assistive devices at home. Patient is able to transport self to appointments. Mother or significant other will transport patient home at time of DC. Patient is requesting an abdominal binder prior to DC- bedside nurse is aware of request for abdominal binder.   Type of Residence Private residence   Number of Stairs to Enter Residence 3  (into her home)   Number of Stairs Within Residence 13  (to bedroom but will stay in living room in recliner chair to avoid stairs after surgery)   Do you have animals or pets at home? Yes   Type of Animals or Pets Dogs   Who is requesting discharge planning? Provider   Home or Post Acute Services None   Expected Discharge Disposition Home   Does the patient need discharge transport arranged? No

## 2024-10-25 NOTE — DISCHARGE INSTRUCTIONS
PLEASE NOTE THE FOLLOWING CHANGES IN YOUR MEDICATION REGIMEN:    If you are diabetic:  - please check your blood sugar daily  - If your blood glucose is repeatedly over 200, start taking Metformin 500mg twice a day and contact your PCP to help guide your future management of your blood glucose  - If your blood glucose is less than 60 or you have symptoms of hypoglycemia (light headed, weak), take glucose or drink a cup of juice. If this happens repeatedly, stop all diabetes medication and contact your PCP or endocrinologist.    Medications:  - Medications should be crushed and mixed with full liquids. Capsules may be opened and mixed with full liquids.  - Extended release medications should not be taken. Please contact your primary care physician to convert extended release medications to immediate release form.   - Take 650mg Tylenol with the tylenol you can take tablets (plain white tablets and cut into smaller pieces or crush in applesauce) or Tylenol powder (new product, 500mg per packet, dissolves in the mouth and does not need water every 6 hours for pain. Take between doses of your opioid pain medication. Try to limit the use of your opioid pain medication and only take as needed.  - Slowly add your vitamins to your daily medication regimen as tolerated. You do not have to take them within the first few days after surgery if they are causing you nausea or other problems.   - If you have medications that you still cannot tolerate by your first visit with your surgeon or dietitian, please discuss this.   - You should be receiving or already have received the following medications for your postoperative course: a proton pump inhibitor for acid suppression (omeprazole, esomeprazole, pantoprazole), antinausea medication (ondansetron, metoclopramide), and pain medication (oxycodone, morphine, hydromorphone, hydrocodone). If you are missing any of these, please call the office immediately so we can prescribe them  for you.  - OPEN UP OMEPRAZOLE CAPSULES AND SPRINKLE THEM IN WATER PRIOR TO TAKING. IF YOU HAD BARIATRIC SURGERY, YOU WILL CONTINUE THIS MEDICATION FOR AT LEAST 6 MONTHS.      Constipation  - Take fiber (benefiber or metamucil) twice daily. Please also take colace (docusate) twice a day while taking oxycodone or other opiates. If you remain constipated despite these medications, please take milk of magnesia and call the office to notify us.      Activity instructions:   - No lifting, pulling, or pushing objects greater than 15 pounds for 4 weeks.  - Activity otherwise as tolerated.   - You may shower. Soap and water may run over your incisions. Pat dry. No submerging in baths or  swimming (activities that keep your incisions underwater) for at least two weeks.    - You may not drive while taking narcotics.     Diet:   - You will go home on a full liquid diet (similar to the diet you were on your final day in the hospital)  - You will stay on this full liquid diet for two weeks. Acceptable full liquids include protein shakes, sugar free jello, sugar free pudding, and creamy soups (no chunks). You will continue to drink clear liquids including water and crystal light. You should aim for 64 ounces of fluid per day, with about half of this being full liquids and half of this being clear liquids. Do not exceed 8 oz per hour.  - After two weeks, you should have a discussion with the dietician about progressing to a combination of full liquid and pureed foods.   - Follow a healthy bariatric diet. Target 5 meals per day (3 mid size meals and two small meals). Avoid concentrated sweets (candy, soda) and limit carbohydrate intake with a preference for healthy proteins (lean meats, beans  - For further information, follow the diet instructions listed in your bariatric surgery instruction packet.     Call Provider If:  - Breathing faster or harder than normal.   - Fever of 100.4 F (38 C) or higher or feeling of chills.   -  Feeling very sleepy and difficult to awaken.   - Inability to drink or significant decrease in ability to drink since discharge.   - Vomiting (throwing up) and not able to eat or drink for 12 hours.   - Urinating much less than your normal.  - More than 4 loose, watery bowel movements in 24 hours (diarrhea).   - Any new concerning symptoms.

## 2024-10-25 NOTE — CONSULTS
Reason For Consult: Bariatric Full Liquid Diet Education    Education Documentation  Bariatric Full Liquid Diet Education After Weight Loss Surgery, taught by Qian Schmitt RDN, LD at 10/25/2024 12:43 PM.  Learner: Significant Other, Patient  Readiness: Acceptance  Method: Explanation, Handout  Response: Verbalizes Understanding    Nutrition Care Manual, taught by Qian Schmitt RDN, LD at 10/25/2024 12:43 PM.  Learner: Significant Other, Patient  Readiness: Acceptance  Method: Explanation, Handout  Response: Verbalizes Understanding

## 2024-10-25 NOTE — PROGRESS NOTES
"Antionette Felix is a 38 y.o. female on day 1 of admission presenting with Morbid obesity (Multi).    Subjective   Patient with some heartburn overnight.  Afebrile with stable hemodynamics.  Tolerated clears yesterday.  Pain controlled on current regimen.       Objective     Physical Exam  Physical Exam:   Constitutional: Well developed, awake/alert/oriented x3, no distress, alert and cooperative  Eyes: PERRL, EOMI, clear sclera  Head/Neck: Neck supple, no apparent injury, No JVD, trachea midline  Respiratory/Thorax: good chest expansion, thorax symmetric  Cardiovascular: Regular, rate and rhythm,  2+ equal pulses of the extremities  Gastrointestinal: Nondistended, soft, minimally tender, no rebound tenderness or guarding, no masses palpable, incisions c/d/i  Musculoskeletal: ROM intact, no joint swelling, normal strength  Extremities: normal extremities, no cyanosis edema, contusions or wounds, no clubbing  Neurological: alert and oriented x3, intact senses,  Psychological: Appropriate mood and behavior  Skin: Warm and dry, no lesions, no rashes     Last Recorded Vitals  Blood pressure 133/88, pulse 67, temperature 36.5 °C (97.7 °F), temperature source Temporal, resp. rate 18, height 1.753 m (5' 9\"), weight 121 kg (265 lb 14 oz), SpO2 100%.  Intake/Output last 3 Shifts:  I/O last 3 completed shifts:  In: 4890 (40.5 mL/kg) [P.O.:210; I.V.:4080 (33.8 mL/kg); IV Piggyback:600]  Out: 2010 (16.7 mL/kg) [Urine:2000 (0.5 mL/kg/hr); Blood:10]  Weight: 120.6 kg     Relevant Results                              Assessment/Plan   Assessment & Plan  Morbid obesity (Multi)    38-year-old female status post uncomplicated laparoscopic sleeve gastrectomy on 10/20/2024.  Doing well postoperatively.  - Upper GI contrast study today to assess for leak or obstruction  - Advance diet to include full liquids if esophagram does not show leak or obstruction  - Zofran every 6 hours for nausea  - Protonix 40mg IV daily  - Tylenol and oxycodone " for pain. Dilaudid as needed for breakthrough pain  - IV fluid support with lactated ringers  - Encourage IS/Ambulation  - Begin discharge planning        I spent 30 minutes in the professional and overall care of this patient.      Kevyn Buckley MD

## 2024-10-25 NOTE — CARE PLAN
The patient's goals for the shift include  tolerate diet, manage pain and nausea, work towards discharge    The clinical goals for the shift include walk, tolerate diet, manage pain and nausea      Problem: Pain - Adult  Goal: Verbalizes/displays adequate comfort level or baseline comfort level  Outcome: Progressing     Problem: Safety - Adult  Goal: Free from fall injury  Outcome: Progressing     Problem: Discharge Planning  Goal: Discharge to home or other facility with appropriate resources  Outcome: Progressing     Problem: Chronic Conditions and Co-morbidities  Goal: Patient's chronic conditions and co-morbidity symptoms are monitored and maintained or improved  Outcome: Progressing

## 2024-10-25 NOTE — DISCHARGE SUMMARY
Discharge Diagnosis  Morbid obesity (Multi)    Issues Requiring Follow-Up  Post op status    Test Results Pending At Discharge  Pending Labs       Order Current Status    Surgical Pathology Exam In process            Hospital Course   Patient presented to the hospital for scheduled sleeve gastrectomy. The procedure was carried out without complications. No issues POD#0. Post operative esophogram was negative for leak or obstruction. Diet was advanced POD#1 without issue. Nausea was adequately controlled. Pain was adequately controlled. Patient did well post operatively overall. Patient discharged home on POD#1 once tolerating 5-8 oz of liquid diet per hour.     Pertinent Physical Exam At Time of Discharge  Physical Exam  Physical Exam:   Constitutional: Well developed, awake/alert/oriented x3, no distress, alert and cooperative  Eyes: PERRL, EOMI, clear sclera  Head/Neck: Neck supple, no apparent injury, No JVD, trachea midline  Respiratory/Thorax: good chest expansion, thorax symmetric  Cardiovascular: Regular, rate and rhythm,  2+ equal pulses of the extremities  Gastrointestinal: Nondistended, soft, minimally tender, no rebound tenderness or guarding, no masses palpable, incisions c/d/i  Musculoskeletal: ROM intact, no joint swelling, normal strength  Extremities: normal extremities, no cyanosis edema, contusions or wounds, no clubbing  Neurological: alert and oriented x3, intact senses,  Psychological: Appropriate mood and behavior  Skin: Warm and dry, no lesions, no rashes     Home Medications     Medication List      START taking these medications     Children's acetaminophen 160 mg/5 mL liquid; Generic drug:   acetaminophen; Take 20.3 mL (650 mg) by mouth every 4 hours if needed for   mild pain (1 - 3).   ondansetron ODT 4 mg disintegrating tablet; Commonly known as:   Zofran-ODT; Take 1 tablet (4 mg) by mouth every 8 hours if needed for   nausea or vomiting.   oxyCODONE 5 mg/5 mL solution; Commonly known as:  Roxicodone; Take 5 mL   (5 mg) by mouth every 6 hours if needed for severe pain (7 - 10).     CHANGE how you take these medications     * omeprazole 40 mg DR capsule; Commonly known as: PriLOSEC; Take 1   capsule (40 mg) by mouth once daily in the morning. Take before meals.   Please remove capsule and use granules and mix with sugar free pudding or   jello; What changed: Another medication with the same name was added. Make   sure you understand how and when to take each.   * omeprazole 40 mg DR capsule; Commonly known as: PriLOSEC; Take 1   capsule (40 mg) by mouth 2 times a day before meals for 14 days. Please   remove capsule and use granules and mix with sugar free pudding or jello;   What changed: Another medication with the same name was added. Make sure   you understand how and when to take each.   * omeprazole 40 mg DR capsule; Commonly known as: PriLOSEC; Take 1   capsule (40 mg) by mouth once daily in the morning. Take before meals.   Please remove capsule and use granules and mix with sugar free pudding or   jello; What changed: You were already taking a medication with the same   name, and this prescription was added. Make sure you understand how and   when to take each.  * This list has 3 medication(s) that are the same as other medications   prescribed for you. Read the directions carefully, and ask your doctor or   other care provider to review them with you.     CONTINUE taking these medications     cetirizine 10 mg tablet; Commonly known as: ZyrTEC   COLLAGEN SKIN RENEWAL ORAL   Fish OiL 1,000 (120-180) mg capsule; Generic drug: omega 3-dha-epa-fish   oil   multivitamin tablet       Outpatient Follow-Up  Future Appointments   Date Time Provider Department [place]   11/1/2024  8:30 AM Aicha Jarvis RD, ROSANNE [id number] [facility]   11/4/2024  1:30 PM Machelle Contreras MD MPH [id number] [facility]   12/6/2024  1:00 PM Aicha Jarvis RD, ROSANNE [id number] [facility]   12/9/2024  1:15 PM Machelle Contreras MD MPH  [id number] [facility]   1/9/2025  2:00 PM MARYANN Galvin-CNP [id number] [facility]   1/14/2025 10:00 AM Aicha Jarvis RD, ROSANNE [id number] [facility]       Kevyn Buckley MD

## 2024-10-28 NOTE — SIGNIFICANT EVENT
Follow Up Phone Call    Incoming phone call    Spoke to: Antionette Felix Relationship:self   Phone number: [phone]      Outcome: contacted patient/ family   No chief complaint on file.         Diagnosis:Not applicable    Reviewed the importance of meeting protein and fluid goals, patient states she is meeting goals. Bowels are moving. Pain is managed. She is ambulating. Denies fever,chills, nausea or vomiting. No further questions or concerns.

## 2024-10-29 NOTE — OP NOTE
GASTRIC SLEEVE LAPAROSCOPIC Operative Note     Date: 10/24/2024  OR Location: [facility] OR    Name: Antionette Felix : 1985, Age: 38 y.o., MRN: [id number], Sex: female    Diagnosis  Pre-op Diagnosis      * Morbid obesity (Multi) [E66.01] Post-op Diagnosis     * Morbid obesity (Multi) [E66.01]     Procedures  GASTRIC SLEEVE LAPAROSCOPIC  56886 - NJ LAPS GSTRC RSTRICTIV PX LONGITUDINAL GASTRECTOMY    GASTRIC SLEEVE LAPAROSCOPIC  84819 - NJ LAPS GSTRC RSTRICTIV PX LONGITUDINAL GASTRECTOMY    N[place] EGD TRANSORAL BIOPSY SINGLE/MULTIPLE [75870]  Surgeons      * Machelle Contreras - Primary    Resident/Fellow/Other Assistant:  Surgeons and Role:  * No surgeons found with a matching role *    Procedure Summary  Anesthesia: General  ASA: III  Anesthesia Staff: Anesthesiologist: Lazarus Perera MD  CRNA: MARYANN Griffith-CRNA  Estimated Blood Loss: 10mL  Intra-op Medications:   Administrations occurring from 0705 to 0920 on 10/24/24:   Medication Name Total Dose   sodium chloride 0.9 % irrigation solution 500 mL   BUPivacaine-EPINEPHrine (Marcaine w/EPI) 30 mL in sodium chloride 0.9% 30 mL syringe 60 mL   heparin (porcine) injection 5,000 Units 5,000 Units   lactated Ringer's infusion 41.99 mL   ceFAZolin (Ancef) vial 1 g 2 g   dexAMETHasone (Decadron) injection 4 mg/mL 8 mg   esmolol (Brevibloc) 2500 mg in sodium chloride 250 mL (10 mg/mL) infusion (premix) 315.06 mg   fentaNYL (Sublimaze) injection 50 mcg/mL 100 mcg   HYDROmorphone (Dilaudid) injection 2 mg/mL 0.5 mg   lidocaine (Xylocaine) injection 2 % 100 mg   metoprolol tartrate (Lopressor) injection 5 mg   midazolam PF (Versed) injection 1 mg/mL 2 mg   ondansetron (Zofran) 2 mg/mL injection 4 mg   phenylephrine 40 mcg/mL syringe 10 mL 80 mcg   propofol (Diprivan) injection 10 mg/mL 200 mg   rocuronium (ZeMuron) 50 mg/5 mL injection 90 mg   scopolamine (Transderm-Scop) patch 1 patch 1 patch   sugammadex (Bridion) 200 mg/2 mL injection 200 mg              Anesthesia Record                Intraprocedure I/O Totals          Intake    Esmolol Drip 0.00 mL    The total shown is the total volume documented since Anesthesia Start was filed.    lactated Ringer's infusion 800.00 mL    Total Intake 800 mL       Output    Est. Blood Loss 10 mL    Total Output 10 mL       Net    Net Volume 790 mL          Specimen:   ID Type Source Tests Collected by Time   1 : GASTRIC REMNANT Tissue STOMACH SLEEVE RESECTION SURGICAL PATHOLOGY EXAM Machelle Contreras MD MPH 10/24/2024 0835        Staff:   Scrub Person: Prieto  Scrub Person: Tiffanie  RNFA: Sanya  Circulator: Crys Blankenship Circulator: Samia         Drains and/or Catheters: * None in log *    Tourniquet Times:         Implants:     Findings: normal anatomy    Indications: Antionette Felix is an 38 y.o. female who is having surgery for Morbid obesity (Multi) [E66.01]. Bmi is 40 and she has made multiple lifestyle changes.     The patient was seen in the preoperative area. The risks, benefits, complications, treatment options, non-operative alternatives, expected recovery and outcomes were discussed with the patient. The possibilities of reaction to medication, pulmonary aspiration, injury to surrounding structures, bleeding, recurrent infection, the need for additional procedures, failure to diagnose a condition, and creating a complication requiring transfusion or operation were discussed with the patient. The patient concurred with the proposed plan, giving informed consent.  The site of surgery was properly noted/marked if necessary per policy. The patient has been actively warmed in preoperative area. Preoperative antibiotics have been ordered and given within 1 hours of incision. Venous thrombosis prophylaxis have been ordered including bilateral sequential compression devices and chemical prophylaxis    Procedure Details: Date of Procedure: 10/24/2024      PREOPERATIVE DIAGNOSIS:   Morbid obesity.       POSTOPERATIVE DIAGNOSIS:   Morbid obesity.        OPERATION/PROCEDURE:   Laparoscopic sleeve gastrectomy, upper endoscopy, bilateral TAP   blocks.       SURGEON:   Machelle Contreras MD, MPH.       ASSISTANT(S):   First assistant is Kevyn Buckley.  No qualified resident was   available to assist with this case.  The fellow assisted with all   portions of the case including the critical portions.       ANESTHESIA:   General.       COMPLICATIONS:   None.       SPECIMEN:   Gastric remnant.       INDICATION FOR PROCEDURE:   The patient is a 38 y.o.  -year-old female   with longstanding history of   morbid obesity.  She has failed multiple weight loss attempts.  She   has a BMI of 40 and multiple comorbidities and now presents for weight loss surgery.       DESCRIPTION OF PROCEDURE:   The patient was brought to the OR, placed in supine position.   General anesthesia was induced.  She was prepped and draped in usual   sterile fashion.  Next, incision was made in left upper quadrant.  A   5 mm optical trocar was used to enter the peritoneal cavity under   direct endoscopic visualization.  The abdomen was inflated with CO2   to 15 mmHg.  A 5 mm 30-degree scope   was inserted into the abdomen inspected, and no gross abnormalities   were noted.  We placed a 15 mm trocar 26 cm from the xiphoid process   just right of midline, another 5 mm trocar in the left anterior   axillary line, another 5 mm trocar in the right semilunar line and   all were placed under visualization after anesthetizing the area with   0.5% Marcaine.  We then placed a 36-[place]zech ViSiGi down toward the   pylorus and this was left in place for the duration of the case.  We   entered the lesser sac using Sonicision device.  The short gastrics   were taken down all the way around such that the left buddy could   clearly be seen.  We mobilized the stomach distally down towards the   pylorus.  Then using the tube as our guide, we created our sleeve   gastrectomy.  The stomach was marked 1 instrument diameter from the  GE junction, 3 cm from the incisura, and 5 cm from the pylorus.  We then introduced the Titan stapler that was reinforced with SEAMGUARD into the peritoneal cavity and brought this into the left upper quadrant.  The stapler was opened and the stomach was brought through the jaws of the stapler lining up the marks that had been made before.  We had to do multiple maneuvers to ensure that the stomach was appropriately aligned within the jaws of the stapler and then the stapler was completely closed.  The stomach was flipped over to inspect posteriorly to make sure that we did not catch any tissues we did not want to.  Once we were assured that the stomach was in good position we went ahead and fired following the suggestions on the box.  Once the stapler was fired the jaws were opened and a beautiful sleeve was apparent. We did an intraoperative leak   test first with the ViSiGi.  No evidence of any air leaks was noted.   We then did an intraoperative endoscopy.  The scope was brought down   transorally into the stomach.  Esophagus was intubated easily.  The   patient had normal-appearing esophagus.  The sleeve was entered and   had normal morphology.  The staple line started 6 cm proximal to   pylorus and came up to, but not across the squamocolumnar junction.   No narrowing was seen at the incisura.  No air leaks were seen.  No   bleeding was seen.  The stomach was deflated.  The endoscope was   removed.  The remnant was removed through the 15 mm trocar site.  The   site was closed at the level of the fascia using 0 Vicryl suture,   placed in simple fashion using laparoscopic suture passer.  The   abdomen was deflated.  Trocars removed.  Skin was closed at all sites   using 4-0 Vicryl suture placed in a subcuticular fashion.  Prineo was   placed on the wounds.  The patient was awakened from anesthesia and   taken to Recovery in good condition.           Machelle Contreras MD, MPH    Complications:  None; patient  tolerated the procedure well.    Disposition: PACU - hemodynamically stable.  Condition: stable         Additional Details: none    Attending Attestation: I was present and scrubbed for the entire procedure.    Machelle Contreras  Phone Number: [phone]

## 2024-10-30 NOTE — PROGRESS NOTES
"BARIATRIC SURGERY CLINIC  2 WEEKS FOLLOW UP NOTE    CLINIC DATE:  11/4/24    NAME:  Antionette Felix 38 y.o.  MRN: [id number]    DATE OF SURGERY:  10/24/24   SURGICAL PROCEDURE:  Laparoscopic sleeve gastrectomy  09046    EXTRA PROCEDURES:  None    COMPLICATIONS DURING ADMISSION: None    INITIAL WEIGHT:  264 LBS  PRE-SURGICAL WEIGHT: 261 LBS  THIS VISIT WEIGHT / BMI: 251 LBS/ BMI: 37.07  Wt Readings from Last 1 Encounters:   11/04/24 114 kg (251 lb)      BMI Readings from Last 1 Encounters:   11/04/24 37.07 kg/m²     WEIGHT / BMI HX:    Wt Readings from Last 3 Encounters:   11/04/24 114 kg (251 lb)   11/01/24 115 kg (254 lb)   10/24/24 121 kg (265 lb 14 oz)      BMI Readings from Last 3 Encounters:   11/04/24 37.07 kg/m²   11/01/24 37.51 kg/m²   10/24/24 39.26 kg/m²        TOTAL WEIGHT LOSS:  3 LBS    SURGEON:  Dr. Machelle Contreras    PRESENTING TODAY FOR BARIATRIC POST-OP VISIT:  2 Weeks.  This LPN spoke with on 10/31/24.  Self verbalizes concerns:  None \"I'm already starting to like the way I look\".  Energy is great.  No need for pain meds.  She is active.   She is on puree  CURRENT SYMPTOMS:    Abdominal pain: No  Back Pain: No            Bloating / Hiccups: No  Burping:  Yes  Constipation: Yes  Will be taking MOM   Diarrhea: No  Experiencing hunger: No  Nausea/vomiting: No    Calf/Thigh pain or swelling: No  Cough / Wheezing: No  Decreased Urine Output: No  Fever/Chills: No  Incisions Drainage, Open Areas, Redness, Warmth:  No  Increased Heart Rate:  No  Shortness of Breath:  No    DIET INTAKE: Liquid with Pudding/Applesauce and Baby Food    Carbonated Beverages: No          Caffeinated Beverages: No    Time to eat meals: 5-10 minutes    Fluid intake: 50-60 oz/day      Protein Intake:  50 grams/day    Breakfast: SF Popsicles - Boiled Egg \"mushed yellow part with sweet potato baby food\"    Lunch: SF Popsicle & Pudding    Dinner: SF Popsicle & Pudding    Snacks: Denies    GERD - Health Related Quality of Life Questionnaire " (GERD- HRQL)  On PPI  Omeprazole     How bad is the heartburn? 0 = No symptoms  Heartburn when lying down? 0 = No symptoms  Heartburn when standing up? 0 = No symptoms  Heartburn after meals? 0 = No symptoms  Does heartburn change your diet? 0 = No symptoms  Does heartburn wake you from sleep? 0 = No symptoms    Do you have difficulty swallowing? 0 = No symptoms  Do you have pain with swallowing? 0 = No symptoms  If you take medication, does this affect your daily life? 1 = Symptoms noticeable but not bothersome    How bad is the regurgitation? 0 = No symptoms  Regurgitation when lying down? 0 = No symptoms  Regurgitation when standing up? 0 = No symptoms  Regurgitation after meals? 0 = No symptoms  Does regurgitation change your diet? 0 = No symptoms  Does regurgitation wake you from sleep? 0 = No symptoms    How satisfied are you with your present condition? Satisfied    Total score (calculated by summing the individual scores of questions 1-15): 1   Greatest possible score 75 (worst symptoms).   Lowest possible score 0 (no symptoms).    Heartburn score (calculated by summing the individual scores of questions 1-6): 0   Worst heartburn symptoms: 30.   No heartburn symptoms: 0.   Score less than or equal to 12 with each individual question not exceeding 2 indicate heartburn elimination.    Regurgitation score (calculated by summing the individual scores of questions 10-15): 0   Worst regurgitation symptoms: 30.   No regurgitation symptoms: 0.   Score less than or equal to 12 with each individual question not exceeding 2 indicate regurgitation elimination.     EXERCISE: Yes  Walking and moving, single Mom taking care of house and family.      PROVIDER LAST IMPRESSIONS VISIT NOTE 10/14/24:  Antionette Felix is a 38 y.o. y.o. female with a BMI of Body mass index is 38.66 kg/m².. Antionette Felix is a 38 y.o. female with a bmi of Body mass index is 38.99 kg/m². with the following diagnoses and co-morbidities: knee weakness.  All  can be helped with weight loss.  She is a great candidate for Weight loss surgery, also has stress inctinence and fatigue.  She is eager for a sleeve and has already quit wmoking marijuana 4 motnhs ago.    Notes not as hungry since quit smoking marijuana.  She notes this has been a good change and has lost some weight since she and I spoke the last time.  She is making better food choices.      With her lifestyle changes, she has lost almost 20 lbs.  She is walking more with a new dog and is more active.   She is feeling great.  She is eager to proceed.  She is a great candidate for sleeve gastrectomy.  We discussed the risks and benefits of the surgery at length and all questions were answered.   She notes the process helped her change life a lot.      They have been preoperatively evaluated and deemed to be an appropriate candidate for bariatric surgery.  Surgery Type: Laparoscopic sleeve gastrectomy  35069     All testing reviewed.  All clearances contained     The risks of Laparoscopic sleeve gastrectomy  70599 surgery including bleeding, leak, wound infection, dehydration, ulcers, internal hernia, DVT/PE, prolonged nausea/vomiting, incomplete resolution of associated medical conditions, reflux, weight regain, vitamin/mineral deficiencies, and death have been explained to the patient and Antionette Felix has expressed understanding and acceptance of them.     The benefits of the above surgery including weight loss, improvement/resolution of associated medical and mental health conditions, improved mobility, and decreased mortality have been explained the the patient and Antionette Felix has expressed understanding and acceptance of them.     Operative and blood transfusion consent forms were signed by the patient and witnessed today.  PLAN:  You are scheduled for a sleeve  on 10/24      CURRENT MEDICATIONS:  Current Outpatient Medications   Medication Sig Dispense Refill    omeprazole (PriLOSEC) 40 mg DR capsule Take 1  capsule (40 mg) by mouth once daily in the morning. Take before meals. Please remove capsule and use granules and mix with sugar free pudding or jello 30 capsule 1    ascorbic acid/collagen hydr (COLLAGEN SKIN RENEWAL ORAL) Take by mouth. 1 Gummy daily      cetirizine (ZyrTEC) 10 mg tablet Take 1 tablet (10 mg) by mouth once daily.      multivitamin tablet Take 1 tablet by mouth once daily.      omega 3-dha-epa-fish oil (Fish OiL) 1,000 mg (120 mg-180 mg) capsule Take 1 capsule (1,000 mg) by mouth once daily.      omeprazole (PriLOSEC) 40 mg DR capsule Take 1 capsule (40 mg) by mouth once daily in the morning. Take before meals. Please remove capsule and use granules and mix with sugar free pudding or jello (Patient not taking: Reported on 2024) 30 capsule 2    omeprazole (PriLOSEC) 40 mg DR capsule Take 1 capsule (40 mg) by mouth 2 times a day before meals for 14 days. Please remove capsule and use granules and mix with sugar free pudding or jello (Patient not taking: Reported on 2024) 30 capsule 1     No current facility-administered medications for this visit.       PAST MEDICAL HISTORY:    Patient Active Problem List   Diagnosis    Psychological factors affecting morbid obesity (Multi)    BMI 40.0-44.9, adult (Multi)    S/P laparoscopic sleeve gastrectomy    Anemia    Eczema    Snoring    Stress incontinence of urine    Encounter for annual physical exam    CELY (obstructive sleep apnea)    Obesity, Class II, BMI 35-39.9    Morbid obesity (Multi)       PAST SURGICAL HISTORY:  Past Surgical History:   Procedure Laterality Date     SECTION, CLASSIC  2019    ESOPHAGOGASTRODUODENOSCOPY  2024       FAMILY HISTORY:  Family History   Problem Relation Name Age of Onset    Diabetes Mother         PAST SOCIAL HX:  Social History     Socioeconomic History    Marital status: Single     Spouse name: Not on file    Number of children: Not on file    Years of education: Not on file    Highest  education level: Not on file   Occupational History    Not on file   Tobacco Use    Smoking status: Never    Smokeless tobacco: Never   Vaping Use    Vaping status: Not on file   Substance and Sexual Activity    Alcohol use: Not Currently    Drug use: Not Currently     Types: Marijuana     Comment: Smoked regularly from age [age]; last used 3/2024    Sexual activity: Not on file   Other Topics Concern    Not on file   Social History Narrative    Not on file     Social Drivers of Health     Financial Resource Strain: Low Risk  (10/24/2024)    Overall Financial Resource Strain (CARDIA)     Difficulty of Paying Living Expenses: Not hard at all   Food Insecurity: No Food Insecurity (10/24/2024)    Hunger Vital Sign     Worried About Running Out of Food in the Last Year: Never true     Ran Out of Food in the Last Year: Never true   Transportation Needs: No Transportation Needs (10/24/2024)    PRAPARE - Transportation     Lack of Transportation (Medical): No     Lack of Transportation (Non-Medical): No   Physical Activity: Not on File (11/10/2023)    Received from [facility]    Physical Activity     Physical Activity: 0   Stress: Not on File (11/10/2023)    Received from [facility]    Stress     Stress: 0   Social Connections: Not on File (9/12/2024)    Received from [facility]    Social Connections     Connectedness: 0   Intimate Partner Violence: Not At Risk (10/24/2024)    Humiliation, Afraid, Rape, and Kick questionnaire     Fear of Current or Ex-Partner: No     Emotionally Abused: No     Physically Abused: No     Sexually Abused: No   Housing Stability: Low Risk  (10/24/2024)    Housing Stability Vital Sign     Unable to Pay for Housing in the Last Year: No     Number of Times Moved in the Last Year: 0     Homeless in the Last Year: No       ALLERGIES:  No Known Allergies    DAILY SUPPLEMENTS:  Calcium: Calcium Citrate w/ vitamin D (1200 - 1500mg)  Multivitamin & Minerals: 2 per day  Iron Supplement: included in  multi-vitamin  Vitamin B12: 1000 mcg   Vitamin D3: 3000 units      REVIEW OF SYSTEMS:  CONSTITUTIONAL: Patient denies fevers, chills, sweats and weight changes.  EYES: Patient denies any visual symptoms.  EARS, NOSE, AND THROAT: No difficulties with hearing. No symptoms of rhinitis or sore throat.  CARDIOVASCULAR: Patient denies chest pains, palpitations, orthopnea and paroxysmal nocturnal dyspnea.  RESPIRATORY: No dyspnea on exertion, no wheezing or cough.  GI: No nausea, vomiting, diarrhea, constipation, abdominal pain, hematochezia or melena.  : No urinary hesitancy or dribbling. No nocturia or urinary frequency. No abnormal urethral discharge.  MUSCULOSKELETAL: No myalgias or arthralgias.  NEUROLOGIC: No chronic headaches, no seizures. Patient denies numbness, tingling or weakness.  PSYCHIATRIC: Patient denies problems with mood disturbance. No problems with anxiety.  ENDOCRINE: No excessive urination or excessive thirst.  DERMATOLOGIC: Patient denies any rashes or skin changes.    PHYSICAL EXAM:  Visit Vitals  /79   Pulse 69     GENERAL: Obese. No apparent distress. Pt is alert and oriented x3.  HEENT: Head is normocephalic and atraumatic. Extraocular muscles are intact. Pupils are equal, round, and reactive to light and accommodation. Nares appeared normal. Mouth is well hydrated and without lesions. Mucous membranes are moist. Posterior pharynx clear of any exudate or lesions.  NECK: Supple. No carotid bruits. No lymphadenopathy or thyromegaly.  LUNGS: Clear to auscultation.  HEART: Regular rate and rhythm without murmur.  ABDOMEN: Soft, nontender, and nondistended. Positive bowel sounds. No hepatosplenomegaly was noted. Dressings in place and healing well.   EXTREMITIES: Without any cyanosis, clubbing, rash, lesions or edema.  NEUROLOGIC: Cranial nerves II through XII are grossly intact.  PSYCHIATRIC: Flat affect, but denies suicidal or homicidal ideations.  SKIN: No ulceration or induration  present.      TODAY'S PROVIDER VISIT IMPRESSIONS: Antionette Felix is a 38 y.o. y.o. female with a BMI of Body mass index is 38.66 kg/m².. Antionette Felix is a 38 y.o. female with a bmi of Body mass index is 38.99 kg/m². with the following diagnoses and co-morbidities: knee weakness.  All can be helped with weight loss.  She is a great candidate for Weight loss surgery, also has stress inctinence and fatigue.  She is eager for a sleeve and has already quit wmoking marijuana 4 motnhs ago.    Notes not as hungry since quit smoking marijuana.  She notes this has been a good change and has lost some weight since she and I spoke the last time.  She is making better food choices.     She is 2 week spost sleeve doing great.  Tolerating diet and on to puree. Energy is great and moving bowels.  She is a model patient. Counseled on diet and exercise and will see her in a month      Plan:      No sign of infection, Doing well, and tolerating diet      Energy: Energy good      Weight loss: Steady      Recommendations: counseled on exercise, Fluid intake 60 oz/day, Protein 60 g/day, Work way off shakes, Do not skip meals, and Follow pouch rules         Activity: Increase activity as tolerated. No heavy lifting > 10 lbs until 4 weeks post-op.      Counseling: Start Vitamins and Omeprazole if you haven't already.       Diet: Advance diet per Handbook/Dietician Instructions, Education on Pureed Diet Phase (spreadable consistency NO CHUNKS) and appropriate diet progression provided.      Return to work: as tolerated       Follow up: 4 weeks      YOU ARE DOING GREAT, you are 2 weeks post-op.      Things will get easier over time.     Work your way off the shakes and get all nutrition through food.    You may transition to pureed foods once your dietician advances your diet. Note: Purees are spreadable consistency NO CHUNKS,  you then can move to soft foods (able to mash with fork).    Continue to aim for 60 grams of protein and 64 oz of water  daily.    Increase the duration and frequency of your exercise regimen as you are able.  Your goal is 1 hour/day .    Continue to take vitamin supplements as directed.    Remember to take your multivitamins 2 times a day, once in the morning and once in the evening.     Take you calcium 2-3 times a day, at least two hours apart from the multivitamin.    Come to support groups.    See the dietician as needed.    Schedule a follow-up visit in 4 weeks.       Dr. Machelle Contreras M.D., MPH  Director of Bariatric & Minimally Invasive Surgery  [place] [address]  T:  [phone]  F:  [phone]     Estrellita Wilkinson, RN Assistant Nurse Manager, Care Coordinator - Bariatric Surgery [facility] Nurse Patient Contact  T:  [phone]  F:  [phone]

## 2024-10-31 ENCOUNTER — TELEPHONE (OUTPATIENT)
Dept: SURGERY | Facility: CLINIC | Age: 39
End: 2024-10-31
Payer: COMMERCIAL

## 2024-11-01 ENCOUNTER — APPOINTMENT (OUTPATIENT)
Dept: SURGERY | Facility: CLINIC | Age: 39
End: 2024-11-01
Payer: COMMERCIAL

## 2024-11-01 VITALS — WEIGHT: 254 LBS | BODY MASS INDEX: 37.62 KG/M2 | HEIGHT: 69 IN

## 2024-11-01 DIAGNOSIS — Z98.84 S/P LAPAROSCOPIC SLEEVE GASTRECTOMY: Primary | ICD-10-CM

## 2024-11-01 LAB
LAB AP ASR DISCLAIMER: NORMAL
LABORATORY COMMENT REPORT: NORMAL
PATH REPORT.ADDENDUM SPEC: NORMAL
PATH REPORT.FINAL DX SPEC: NORMAL
PATH REPORT.GROSS SPEC: NORMAL
PATH REPORT.RELEVANT HX SPEC: NORMAL
PATH REPORT.TOTAL CANCER: NORMAL

## 2024-11-04 ENCOUNTER — APPOINTMENT (OUTPATIENT)
Dept: SURGERY | Facility: CLINIC | Age: 39
End: 2024-11-04
Payer: COMMERCIAL

## 2024-11-04 VITALS
DIASTOLIC BLOOD PRESSURE: 79 MMHG | WEIGHT: 251 LBS | BODY MASS INDEX: 37.07 KG/M2 | SYSTOLIC BLOOD PRESSURE: 122 MMHG | HEART RATE: 69 BPM

## 2024-11-04 DIAGNOSIS — Z98.84 S/P LAPAROSCOPIC SLEEVE GASTRECTOMY: Primary | ICD-10-CM

## 2024-11-04 PROCEDURE — 99024 POSTOP FOLLOW-UP VISIT: CPT | Performed by: SURGERY

## 2024-11-04 NOTE — PATIENT INSTRUCTIONS
Plan:      No sign of infection, Doing well, and tolerating diet      Energy: Energy good      Weight loss: Steady      Recommendations: counseled on exercise, Fluid intake 60 oz/day, Protein 60 g/day, Work way off shakes, Do not skip meals, and Follow pouch rules         Activity: Increase activity as tolerated. No heavy lifting > 10 lbs until 4 weeks post-op.      Counseling: Start Vitamins and Omeprazole if you haven't already.       Diet: Advance diet per Handbook/Dietician Instructions, Education on Pureed Diet Phase (spreadable consistency NO CHUNKS) and appropriate diet progression provided.      Return to work: as tolerated       Follow up: 4 weeks      YOU ARE DOING GREAT, you are 2 weeks post-op.      Things will get easier over time.     Work your way off the shakes and get all nutrition through food.    You may transition to pureed foods once your dietician advances your diet. Note: Purees are spreadable consistency NO CHUNKS,  you then can move to soft foods (able to mash with fork).    Continue to aim for 60 grams of protein and 64 oz of water daily.    Increase the duration and frequency of your exercise regimen as you are able.  Your goal is 1 hour/day .    Continue to take vitamin supplements as directed.    Remember to take your multivitamins 2 times a day, once in the morning and once in the evening.     Take you calcium 2-3 times a day, at least two hours apart from the multivitamin.    Come to support groups.    See the dietician as needed.    Schedule a follow-up visit in 4 weeks.       Dr. Machelle Contreras M.D., MPH  Director of Bariatric & Minimally Invasive Surgery  [place] [address]  T:  [phone]  F:  [phone]     Estrellita Wilkinson, RN Assistant Nurse Manager, Care Coordinator - Bariatric Surgery [facility] Nurse Patient Contact  T:  [phone]  F:  [phone]

## 2024-11-08 ENCOUNTER — TELEPHONE (OUTPATIENT)
Dept: SURGERY | Facility: CLINIC | Age: 39
End: 2024-11-08
Payer: COMMERCIAL

## 2024-11-08 NOTE — TELEPHONE ENCOUNTER
Ryan Adan per our conversation your Post-Op visit with Dr. Contreras on 12/9/24 has been moved to 8:30AM.  Thanks and will see you there!  [facility], [facility] Nurse Dr. Contreras [phone]

## 2024-11-19 ENCOUNTER — NURSE ONLY (OUTPATIENT)
Dept: SURGERY | Facility: CLINIC | Age: 39
End: 2024-11-19
Payer: COMMERCIAL

## 2024-11-19 NOTE — PROGRESS NOTES
"Follow Up Bariatric Nutrition Assessment    Name: Antionette Felix  MRN: [id number]  Date: 12/06/24     Surgery Date: 10/24/24  Surgeon: Diane  Procedure: sleeve gastrectomy     ASSESSMENT:  Current weight in pounds:     Vitals:    12/06/24 1308   Weight: 110 kg (242 lb)      Ht:  1.753 m (5' 9\")   BMI:  Body mass index is 35.74 kg/m².  Previous weight in pounds: 254.0  11/01/24  Initial start weight in pounds: 280.0  EBW in pounds: 111.0  Total weight change in pounds: 38.0  %EBW Lost: 34.2    PROGRESS:  Nutrition Interventions for last encounter   Continue to drink your protein shakes to meet your goal of 60-70 g of protein per day. Begin measuring how much protein you can eat on the soft diet so that you know when to start weaning off of your protein shakes.   Continue to drink 64 oz. of zero calorie beverages per day  Continue no drinking 30 min before, during the meal and for 30 minutes after the meal  Continue to exercise  Advance to the puree diet for 1 week, then soft food for 3 weeks  Remember to eat slowly and chew thoroughly  Try one new food at a time to test for any intolerances.   Continue to take all of your vitamins and minerals.     CHANGES IN TREATMENT:   Patient met goals: yes   24 hour food recall: 73 oz. fluid and 92 grams protein  Breakfast: boiled egg and later 1/2 Greek yogurt 95 grams protein  Snack:  Ensure Max shake  Lunch: 2 oz. salmon and 1/4 sweet potato without skin  Snack: no  Dinner: 2 oz. salmon and 1/4 sweet potato without skin  Snack: Ensure Max shake  Beverages: water, Ensure Max  Alcohol: no    Vitamins: 1 MVI pill crushed twice daily,  1000 mg calcium (Bariatric Advantage), and 500 mcg. B12, biotin   Medications:   Current Outpatient Medications:     ascorbic acid/collagen hydr (COLLAGEN SKIN RENEWAL ORAL), Take by mouth. 1 Gummy daily, Disp: , Rfl:     cetirizine (ZyrTEC) 10 mg tablet, Take 1 tablet (10 mg) by mouth once daily., Disp: , Rfl:     multivitamin tablet, Take 1 tablet " by mouth once daily., Disp: , Rfl:     omega 3-dha-epa-fish oil (Fish OiL) 1,000 mg (120 mg-180 mg) capsule, Take 1 capsule (1,000 mg) by mouth once daily., Disp: , Rfl:     omeprazole (PriLOSEC) 40 mg DR capsule, Take 1 capsule (40 mg) by mouth once daily in the morning. Take before meals. Please remove capsule and use granules and mix with sugar free pudding or jello (Patient not taking: Reported on 11/4/2024), Disp: 30 capsule, Rfl: 2    omeprazole (PriLOSEC) 40 mg DR capsule, Take 1 capsule (40 mg) by mouth 2 times a day before meals for 14 days. Please remove capsule and use granules and mix with sugar free pudding or jello (Patient not taking: Reported on 11/4/2024), Disp: 30 capsule, Rfl: 1    omeprazole (PriLOSEC) 40 mg DR capsule, Take 1 capsule (40 mg) by mouth once daily in the morning. Take before meals. Please remove capsule and use granules and mix with sugar free pudding or jello, Disp: 30 capsule, Rfl: 1    Physical Activity: walking 35 min. 4x/wk and light weights for 10 min. 3x/wk.    READINESS TO LEARN:  Motivation to learn:          Interested      Understanding of instruction:  Good      Family Support: Unable to assess-family not present      The pt is 6 weeks postop sleeve gastrectomy.   Patient has lost 38.0 pounds since initial assessment accounting for 34.2% loss excess body weight.  Tolerating soft diet without difficulty.  Protein intake is adequate for post-op individual. Fluid consumption is adequate. Patient is working on supplementing recommended vitamin/minerals.   Pt states concerns/difficulties with constipation with hemorrhoids and is taking MiraLAX daily..  Discussed the transition diet.    Reminded pt to eat slowly, chew thoroughly and to try on new food at a time.     Malnutrition Screening  Significant unintentional weight loss? n/a  Eating less than 75% of usual intake for more than 2 weeks? n/a    Nutrition Diagnosis:   Increased protein and nutrition needs related to  altered GI function as evidenced by pt. s/p sleeve gastrectomy.  Food- and nutrition-related knowledge deficit related to lack of prior exposure to surgical weight loss information as evidenced by diet recall.     Nutrition Interventions:   Modify type and amount of food and nutrients within meals and snacks.  Comprehensive Nutrition Education  -Nutrition education materials: none   Recommendations:    1.   Continue to eat 60-80 g of protein per day. Wean off at least 1 protein shake daily and try other sources of soft protein including baked cod, tilapia, tuna, seafood, ground turkey,eggs,  tofu and Greek yogurt.  Increase produce per tolerance to help increase fiber intake.  2.   Continue to drink at least 64 oz. of zero calorie beverages per day.  3.   Continue no drinking 30 min before, during the meal and for 30 minutes after the meal        4.   Increase intensity and duration of exercise.  5.   Advance to transition diet. Try raw veggies, fresh fruit and lean ground beef.   6.   Eat slowly, chew thoroughly.  7.   Try one new food at a time.         8.   Continue current vit/min regimen and increase calcium citrate chews to 2.5 to 3 daily.  Take 1-3 Colace daily as needed                And try Benefiber per tolerance.  9.   Attend support groups as needed.    Nutrition Monitoring and Evaluation:   1-2 pounds weight loss per week  Criteria: weight check, food recall  Need for Follow-up: 3 months post-op

## 2024-11-25 ENCOUNTER — APPOINTMENT (OUTPATIENT)
Dept: SURGERY | Facility: CLINIC | Age: 39
End: 2024-11-25
Payer: COMMERCIAL

## 2024-12-06 ENCOUNTER — APPOINTMENT (OUTPATIENT)
Dept: SURGERY | Facility: CLINIC | Age: 39
End: 2024-12-06
Payer: COMMERCIAL

## 2024-12-06 VITALS — BODY MASS INDEX: 35.84 KG/M2 | HEIGHT: 69 IN | WEIGHT: 242 LBS

## 2024-12-06 DIAGNOSIS — E66.812 OBESITY, CLASS II, BMI 35-39.9: ICD-10-CM

## 2024-12-06 DIAGNOSIS — Z98.84 S/P LAPAROSCOPIC SLEEVE GASTRECTOMY: Primary | ICD-10-CM

## 2024-12-09 ENCOUNTER — APPOINTMENT (OUTPATIENT)
Dept: SURGERY | Facility: CLINIC | Age: 39
End: 2024-12-09
Payer: COMMERCIAL

## 2024-12-10 NOTE — PROGRESS NOTES
WEIGHT LOSS SURGERY CLINIC  6 WEEKS FOLLOW UP NOTE    CLINIC DATE: 12/12/24    NAME:  Bessy Garay y.o.  MRN:  [id number]    DATE OF SURGERY:  10/24/24   SURGICAL PROCEDURE:  Laparoscopic sleeve gastrectomy  75537    EXTRA PROCEDURES:  None    COMPLICATIONS DURING ADMISSION: None    INITIAL WEIGHT:  264  LBS  PRE-SURGICAL WEIGHT:  261  LBS  THIS VISIT WEIGHT / BMI:   Wt Readings from Last 1 Encounters:   12/06/24 110 kg (242 lb)      BMI Readings from Last 1 Encounters:   12/06/24 35.74 kg/m²     WEIGHT / BMI HX:    Wt Readings from Last 3 Encounters:   12/06/24 110 kg (242 lb)   11/04/24 114 kg (251 lb)   11/01/24 115 kg (254 lb)      BMI Readings from Last 3 Encounters:   12/06/24 35.74 kg/m²   11/04/24 37.07 kg/m²   11/01/24 37.51 kg/m²        TOTAL WEIGHT LOSS:  22  LBS    SURGEON:  Dr. Machelle Contreras    PRESENTING TODAY FOR BARIATRIC POST-OP VISIT:  Visit:  6 weeks.      CURRENT SYMPTOMS:      Abdominal Pain:  No           Burping: Yes, associated with drinking water    Constipation: Yes    Diarrhea: No    Dumping Syndrome: No    Hiccups: Yes at times    Hunger: No    Food Intolerances: No    Nausea: No    Vomiting: No    EATING HABITS HISTORY:       Carbonated Beverages: No          Caffeinated Beverages: No    Time To Eat Meals 30 Minutes: No, it can take up to 3 hours    Fluid Intake 60-64 oz/day:  Yes      Protein Intake 60 grams/day: Yes    Breakfast: 1 HBE + oatmeal    Lunch: small salad + Ranch + chicken breast strips    Dinner: small salad + Ranch + chicken breast strips    Snacks: 000 yogurt      EXERCISE: Yes, treadmill X 45 minutes, walking dog daily, strength training, weights         PROVIDER LAST IMPRESSION VISIT NOTE:          CURRENT MEDICATIONS:  Current Outpatient Medications   Medication Sig Dispense Refill    ascorbic acid/collagen hydr (COLLAGEN SKIN RENEWAL ORAL) Take by mouth. 1 Gummy daily      cetirizine (ZyrTEC) 10 mg tablet Take 1 tablet (10 mg) by mouth once daily.      multivitamin  tablet Take 1 tablet by mouth once daily.      omega 3-dha-epa-fish oil (Fish OiL) 1,000 mg (120 mg-180 mg) capsule Take 1 capsule (1,000 mg) by mouth once daily.      omeprazole (PriLOSEC) 40 mg DR capsule Take 1 capsule (40 mg) by mouth once daily in the morning. Take before meals. Please remove capsule and use granules and mix with sugar free pudding or jello (Patient not taking: Reported on 2024) 30 capsule 2    omeprazole (PriLOSEC) 40 mg DR capsule Take 1 capsule (40 mg) by mouth 2 times a day before meals for 14 days. Please remove capsule and use granules and mix with sugar free pudding or jello (Patient not taking: Reported on 2024) 30 capsule 1    omeprazole (PriLOSEC) 40 mg DR capsule Take 1 capsule (40 mg) by mouth once daily in the morning. Take before meals. Please remove capsule and use granules and mix with sugar free pudding or jello 30 capsule 1     No current facility-administered medications for this visit.       PAST MEDICAL HISTORY:  Patient Active Problem List   Diagnosis    Psychological factors affecting morbid obesity (Multi)    BMI 40.0-44.9, adult (Multi)    S/P laparoscopic sleeve gastrectomy    Anemia    Eczema    Snoring    Stress incontinence of urine    Encounter for annual physical exam    CELY (obstructive sleep apnea)    Obesity, Class II, BMI 35-39.9    Morbid obesity (Multi)       PAST SURGICAL HISTORY:  Past Surgical History:   Procedure Laterality Date     SECTION, CLASSIC  2019    ESOPHAGOGASTRODUODENOSCOPY  2024       FAMILY HISTORY:  Family History   Problem Relation Name Age of Onset    Diabetes Mother         PAST SOCIAL HX:  Social History     Socioeconomic History    Marital status: Single     Spouse name: Not on file    Number of children: Not on file    Years of education: Not on file    Highest education level: Not on file   Occupational History    Not on file   Tobacco Use    Smoking status: Never    Smokeless tobacco: Never   Vaping Use     Vaping status: Not on file   Substance and Sexual Activity    Alcohol use: Not Currently    Drug use: Not Currently     Types: Marijuana     Comment: Smoked regularly from age [age]; last used 3/2024    Sexual activity: Not on file   Other Topics Concern    Not on file   Social History Narrative    Not on file     Social Drivers of Health     Financial Resource Strain: Low Risk  (10/24/2024)    Overall Financial Resource Strain (CARDIA)     Difficulty of Paying Living Expenses: Not hard at all   Food Insecurity: No Food Insecurity (10/24/2024)    Hunger Vital Sign     Worried About Running Out of Food in the Last Year: Never true     Ran Out of Food in the Last Year: Never true   Transportation Needs: No Transportation Needs (10/24/2024)    PRAPARE - Transportation     Lack of Transportation (Medical): No     Lack of Transportation (Non-Medical): No   Physical Activity: Not on File (11/10/2023)    Received from [employer]    Physical Activity     Physical Activity: 0   Stress: Not on File (11/10/2023)    Received from [facility]    Stress     Stress: 0   Social Connections: Not on File (9/12/2024)    Received from [employer]    Social Connections     Connectedness: 0   Intimate Partner Violence: Not At Risk (10/24/2024)    Humiliation, Afraid, Rape, and Kick questionnaire     Fear of Current or Ex-Partner: No     Emotionally Abused: No     Physically Abused: No     Sexually Abused: No   Housing Stability: Low Risk  (10/24/2024)    Housing Stability Vital Sign     Unable to Pay for Housing in the Last Year: No     Number of Times Moved in the Last Year: 0     Homeless in the Last Year: No       ALLERGIES:  No Known Allergies    DAILY SUPPLEMENTS:  Calcium: Calcium Citrate w/ vitamin D (1200 - 1500mg) - yes  Multivitamin & Minerals: 2 per day - yes  Iron Supplement: unable d/t bad constipation  Vitamin B12: 1000 mcg - included in bariatric MVI  Vitamin D3: 3000 units - included in bariatric MV  Other: collagen, skin  hair and nail gummies    HPI: 6 weeks s/p sleeve gastrectomy. Patient is feeling great. She reaches her goals with protein and fluids. She takes up to 3 hours to eat her meal. She had to switch her bariatric MVI d/t the intolerance to Iron (Constipation). We discussed in details the need to eat 30 minutes and leave the left out on the plate food for the next meal taking.     REVIEW OF SYSTEMS:  CONSTITUTIONAL: Patient denies fevers, chills, sweats and weight changes.  EYES: Patient denies any visual symptoms.  EARS, NOSE, AND THROAT: No difficulties with hearing. No symptoms of rhinitis or sore throat.  CARDIOVASCULAR: Patient denies chest pains, palpitations, orthopnea and paroxysmal nocturnal dyspnea.  RESPIRATORY: No dyspnea on exertion, no wheezing or cough.  GI: No nausea, vomiting, diarrhea, constipation, abdominal pain, hematochezia or melena.  : No urinary hesitancy or dribbling. No nocturia or urinary frequency. No abnormal urethral discharge.  MUSCULOSKELETAL: No myalgias or arthralgias.  NEUROLOGIC: No chronic headaches, no seizures. Patient denies numbness, tingling or weakness.  PSYCHIATRIC: Patient denies problems with mood disturbance. No problems with anxiety.  ENDOCRINE: No excessive urination or excessive thirst.  DERMATOLOGIC: Patient denies any rashes or skin changes.    PHYSICAL EXAM:  There were no vitals taken for this visit.  GENERAL: Obese. No apparent distress. Pt is alert and oriented x3.  HEENT: Head is normocephalic and atraumatic. Extraocular muscles are intact. Pupils are equal, round, and reactive to light and accommodation. Nares appeared normal. Mouth is well hydrated and without lesions. Mucous membranes are moist. Posterior pharynx clear of any exudate or lesions.  NECK: Supple. No carotid bruits. No lymphadenopathy or thyromegaly.  LUNGS: Clear to auscultation.  HEART: Regular rate and rhythm without murmur.  ABDOMEN: Soft, nontender, and nondistended. Positive bowel sounds. No  hepatosplenomegaly was noted.  EXTREMITIES: Without any cyanosis, clubbing, rash, lesions or edema.  NEUROLOGIC: Cranial nerves II through XII are grossly intact.  PSYCHIATRIC: Flat affect, but denies suicidal or homicidal ideations.  SKIN: No ulceration or induration present.      PROVIDER VISIT IMPRESSIONS:      A/P: Normal post-OP course      No sign of infection, Doing well, and tolerating diet      Energy: Energy improved      Weight loss: Steady      Recommendations: Fluid intake 60 oz/day, Protein 60 g/day, Plan your meals, Do not skip meals, Follow pouch rules, Continue taking vitamins as directed., and 3 month labs      Follow up: 6 weeks      Congratulations, you are 6 weeks post-op: Surgical Procedure: Laparoscopic sleeve gastrectomy  91600 into your journey!    The rules of 60--Continue to aim for 60 grams of protein and 64 oz of water a day.    Be sure to take you multivitamins, B12 and calcium daily.    Continue to exercise, increase intensity and variety of your exercise routine, and aim for 300 minutes a week total.    Come to support groups.    Follow up with the Dietician as needed..    Schedule at the  a follow up visit in 6 weeks for your 3 month post-op follow up visit.

## 2024-12-12 ENCOUNTER — APPOINTMENT (OUTPATIENT)
Dept: SURGERY | Facility: CLINIC | Age: 39
End: 2024-12-12
Payer: COMMERCIAL

## 2024-12-12 VITALS
RESPIRATION RATE: 18 BRPM | SYSTOLIC BLOOD PRESSURE: 114 MMHG | BODY MASS INDEX: 35.93 KG/M2 | HEIGHT: 69 IN | WEIGHT: 242.6 LBS | DIASTOLIC BLOOD PRESSURE: 78 MMHG | OXYGEN SATURATION: 100 % | HEART RATE: 77 BPM

## 2024-12-12 DIAGNOSIS — K91.2 POSTOPERATIVE MALABSORPTION (HHS-HCC): Primary | ICD-10-CM

## 2024-12-12 DIAGNOSIS — Z98.84 S/P LAPAROSCOPIC SLEEVE GASTRECTOMY: ICD-10-CM

## 2024-12-12 DIAGNOSIS — K29.30 CHRONIC SUPERFICIAL GASTRITIS WITHOUT BLEEDING: ICD-10-CM

## 2024-12-12 DIAGNOSIS — A04.8 H. PYLORI INFECTION: ICD-10-CM

## 2024-12-12 PROCEDURE — 99024 POSTOP FOLLOW-UP VISIT: CPT

## 2024-12-12 PROCEDURE — 3008F BODY MASS INDEX DOCD: CPT

## 2024-12-12 RX ORDER — OMEPRAZOLE 40 MG/1
40 CAPSULE, DELAYED RELEASE ORAL
Qty: 60 CAPSULE | Refills: 1 | Status: SHIPPED | OUTPATIENT
Start: 2024-12-12 | End: 2025-02-10

## 2024-12-12 ASSESSMENT — PAIN SCALES - GENERAL: PAINLEVEL_OUTOF10: 0-NO PAIN

## 2024-12-17 NOTE — PROGRESS NOTES
WEIGHT [facility] SUR[facility]ERY CL[facility]IC  6 WEEKS FOLLOW UP NOTE    CLINIC DATE: 01/09/25    NAME:  Bessy Garay y.o.  MRN:  [id number]    DATE OF SURGERY:  10/24/24   SURGICAL PROCEDURE:  Laparoscopic sleeve gastrectomy  71574    EXTRA PROCEDURES:  None    COMPLICATIONS DURING ADMISSION: None      Virtual or Telephone Consent    An interactive audio and video telecommunication system which permits real time communications between the patient (at the originating site) and provider (at the distant site) was utilized to provide this telehealth service.   Verbal consent was requested and obtained from Antionette Felix on this date, 01/09/25 for a telehealth visit.      INITIAL WEIGHT:  264  LBS  PRE-SURGICAL WEIGHT:  261  LBS  THIS VISIT WEIGHT / BMI:   Wt Readings from Last 1 Encounters:   01/09/25 104 kg (230 lb)      BMI Readings from Last 1 Encounters:   01/09/25 33.97 kg/m²     WEIGHT / BMI HX:    Wt Readings from Last 3 Encounters:   01/09/25 104 kg (230 lb)   12/12/24 110 kg (242 lb 9.6 oz)   12/06/24 110 kg (242 lb)      BMI Readings from Last 3 Encounters:   01/09/25 33.97 kg/m²   12/12/24 35.83 kg/m²   12/06/24 35.74 kg/m²        TOTAL WEIGHT LOSS:  34  LBS    SURGEON:  Dr. Machelle Contreras    PRESENTING TODAY FOR BARIATRIC POST-OP VISIT:  Visit: 3 months.      CURRENT SYMPTOMS:      Abdominal Pain:  No           Burping: Yes, associated with drinking water, but it is improved    Constipation: Yes, but improved    Diarrhea: No    Dumping Syndrome: No    Hiccups: denies    Hunger: Yes, it comes and goes    Food Intolerances: Yes - greasy food    Nausea: No    Vomiting: No    EATING HABITS HISTORY:       Carbonated Beverages: No          Caffeinated Beverages: No    Time To Eat Meals 30 Minutes: yes    Fluid Intake 60-64 oz/day:  Yes      Protein Intake 60 grams/day: Yes    Breakfast: piata salad bowl with salmon and veggies    Lunch: piata salad bowl with salmon and veggies    Dinner: baked chicken with steamed veggies    Snacks:  000 yogurt or pair/kiwi/pineapple chunks     EXERCISE: Yes, treadmill X 45 minutes at [facility], walking dog daily, strength training, weights         CURRENT MEDICATIONS:  Current Outpatient Medications   Medication Sig Dispense Refill    ascorbic acid/collagen hydr (COLLAGEN SKIN RENEWAL ORAL) Take by mouth. 1 Gummy daily      cetirizine (ZyrTEC) 10 mg tablet Take 1 tablet (10 mg) by mouth once daily.      multivitamin tablet Take 1 tablet by mouth once daily.      omega 3-dha-epa-fish oil (Fish OiL) 1,000 mg (120 mg-180 mg) capsule Take 1 capsule (1,000 mg) by mouth once daily.      omeprazole (PriLOSEC) 40 mg DR capsule Take 1 capsule (40 mg) by mouth once daily in the morning. Take before meals. Please remove capsule and use granules and mix with sugar free pudding or jello (Patient not taking: Reported on 2024) 30 capsule 2    omeprazole (PriLOSEC) 40 mg DR capsule Take 1 capsule (40 mg) by mouth once daily in the morning. Take before meals. Please remove capsule and use granules and mix with sugar free pudding or jello 30 capsule 1    omeprazole (PriLOSEC) 40 mg DR capsule Take 1 capsule (40 mg) by mouth 2 times a day before meals. Please remove capsule and use granules and mix with sugar free pudding or jello 60 capsule 1     No current facility-administered medications for this visit.       PAST MEDICAL HISTORY:  Patient Active Problem List   Diagnosis    Psychological factors affecting morbid obesity (Multi)    BMI 40.0-44.9, adult (Multi)    S/P laparoscopic sleeve gastrectomy    Anemia    Eczema    Snoring    Stress incontinence of urine    Encounter for annual physical exam    CELY (obstructive sleep apnea)    Obesity, Class II, BMI 35-39.9    Morbid obesity (Multi)    Postoperative malabsorption ([facility])       PAST SURGICAL HISTORY:  Past Surgical History:   Procedure Laterality Date     SECTION, CLASSIC  2019    ESOPHAGOGASTRODUODENOSCOPY  2024       FAMILY HISTORY:  Family History    Problem Relation Name Age of Onset    Diabetes Mother         PAST SOCIAL HX:  Social History     Socioeconomic History    Marital status: Single     Spouse name: Not on file    Number of children: Not on file    Years of education: Not on file    Highest education level: Not on file   Occupational History    Not on file   Tobacco Use    Smoking status: Never    Smokeless tobacco: Never   Vaping Use    Vaping status: Not on file   Substance and Sexual Activity    Alcohol use: Not Currently    Drug use: Not Currently     Types: Marijuana     Comment: Smoked regularly from age [age]; last used 3/2024    Sexual activity: Not on file   Other Topics Concern    Not on file   Social History Narrative    Not on file     Social Drivers of Health     Financial Resource Strain: Low Risk  (10/24/2024)    Overall Financial Resource Strain (CARDIA)     Difficulty of Paying Living Expenses: Not hard at all   Food Insecurity: No Food Insecurity (10/24/2024)    Hunger Vital Sign     Worried About Running Out of Food in the Last Year: Never true     Ran Out of Food in the Last Year: Never true   Transportation Needs: No Transportation Needs (10/24/2024)    PRAPARE - Transportation     Lack of Transportation (Medical): No     Lack of Transportation (Non-Medical): No   Physical Activity: Not on File (11/10/2023)    Received from [facility]    Physical Activity     Physical Activity: 0   Stress: Not on File (11/10/2023)    Received from [facility]    Stress     Stress: 0   Social Connections: Not on File (9/12/2024)    Received from [employer]    Social Connections     Connectedness: 0   Intimate Partner Violence: Not At Risk (10/24/2024)    Humiliation, Afraid, Rape, and Kick questionnaire     Fear of Current or Ex-Partner: No     Emotionally Abused: No     Physically Abused: No     Sexually Abused: No   Housing Stability: Low Risk  (10/24/2024)    Housing Stability Vital Sign     Unable to Pay for Housing in the Last Year: No      Number of Times Moved in the Last Year: 0     Homeless in the Last Year: No       ALLERGIES:  No Known Allergies    DAILY SUPPLEMENTS:  Calcium: Calcium Citrate w/ vitamin D (1200 - 1500mg) - yes  Multivitamin & Minerals: 2 per day - yes  Iron Supplement: unable d/t bad constipation  Vitamin B12: 1000 mcg - included in bariatric MVI  Vitamin D3: 3000 units - included in bariatric MV  Other: collagen, skin hair and nail gummies    HPI: 6 weeks s/p sleeve gastrectomy. Patient is feeling great. She reaches her goals with protein and fluids. She takes up to 3 hours to eat her meal. She had to switch her bariatric MVI d/t the intolerance to Iron (Constipation). We discussed in details the need to eat 30 minutes and leave the left out on the plate food for the next meal taking.     3 months s/p sleeve gastrectomy. Patient is feeling great. She reaches her goals with protein and fluids. She takes about 30 hours to eat her meal. She is tolerating food well, expanding on different types of food. She is no longer appreciates greasy foods, looking for healthier versions. She is more physically active. No c/o s/s of GERD. She radiates happiness. Nice and steady weight loss. She has not get her 3 months blood work done, order for blood work is in place.      REVIEW OF SYSTEMS:  CONSTITUTIONAL: Patient denies fevers, chills, sweats and weight changes.  EYES: Patient denies any visual symptoms.  EARS, NOSE, AND THROAT: No difficulties with hearing. No symptoms of rhinitis or sore throat.  CARDIOVASCULAR: Patient denies chest pains, palpitations, orthopnea and paroxysmal nocturnal dyspnea.  RESPIRATORY: No dyspnea on exertion, no wheezing or cough.  GI: No nausea, vomiting, diarrhea, constipation, abdominal pain, hematochezia or melena.  : No urinary hesitancy or dribbling. No nocturia or urinary frequency. No abnormal urethral discharge.  MUSCULOSKELETAL: No myalgias or arthralgias.  NEUROLOGIC: No chronic headaches, no  seizures. Patient denies numbness, tingling or weakness.  PSYCHIATRIC: Patient denies problems with mood disturbance. No problems with anxiety.  ENDOCRINE: No excessive urination or excessive thirst.  DERMATOLOGIC: Patient denies any rashes or skin changes.    PHYSICAL EXAM:  There were no vitals taken for this visit.  General- No acute distress, well appearing and well nourished.  HEENT - WNL  Pulmonary - respiratory effort normal  Skin - no visible rashes or lesions  Neurologic -  WNL, no obvious abnormalities   Psychiatric - AXO x3, mood and affect appropriate       PROVIDER VISIT IMPRESSIONS:      A/P: Normal post-OP course      Doing well, tolerating diet, and Mobility improved      Energy: Energy good      Weight loss: Steady      Recommendations: Fluid intake 60 oz/day, Protein 60 g/day, Plan your meals, Do not skip meals, Follow pouch rules, Continue taking vitamins as directed., 3 month labs, and Support groups      Follow up:  3 months    Congratulations, you are 3 months post-op: Surgical Procedure: Laparoscopic sleeve gastrectomy  97536  into your journey!      The rules of 60--Continue to aim for 70 grams of protein and 64 oz of water a day.    Please have your 3 month lab work completed today (if you have not already), we will call you with any abnormal results.     Be sure to take you multivitamins, B12 and calcium daily.    Continue to exercise, increase intensity and variety of your exercise routine, and aim for 300 minutes a week total.    Come to support groups.    Follow up with the Dietician as needed..    Schedule a follow-up visit in 3 months for your 6 month visit.

## 2025-01-07 NOTE — PROGRESS NOTES
"Follow Up Bariatric Nutrition Assessment    Name: Antionette Felix  MRN: [id number]  Date: 01/14/25     Surgery Date: 10/24/24  Surgeon: Diane  Procedure: sleeve gastrectomy    ASSESSMENT:    Current weight in pounds:     Vitals:    01/14/25 1005   Weight: 104 kg (230 lb)        Ht:  1.753 m (5' 9\")      BMI:  Body mass index is 33.97 kg/m².  Previous weight in pounds: 242.0  12/06/24  Initial start weight in pounds: 280.0  9/19/23  EBW in pounds: 111.0  Total weight change in pounds: 50.0  %EBW Lost: 45.0    PROGRESS:  Nutrition Interventions for last encounter   Continue to drink your protein shakes to meet your goal of 60-70 g of protein per day. Begin measuring how much protein you can eat on the soft diet so that you know when to start weaning off of your protein shakes.   Continue to drink 64 oz. of zero calorie beverages per day  Continue no drinking 30 min before, during the meal and for 30 minutes after the meal  Continue to exercise  Remember to eat slowly and chew thoroughly  Try one new food at a time to test for any intolerances.   Continue to take all of your vitamins and minerals.   Attend support groups as needed    CHANGES IN TREATMENT:   Patient met goals: yes   24 hour food recall: 85 oz. fluid and 62 grams protein  Breakfast: 2 eggs and Greek yogurt  Snack: no  Lunch: 2.5 oz. shredded chicken blended with cream chicken soup  Snack: air fried brussels sprouts  Dinner:  same as lunch  Snack: no  Beverages: water, Gatorade zero  Alcohol: no    Vitamins:  1 Bariatric Pal (without iron) and 1 MVI with iron daily,  1000 mg calcium (Bariatric Advantage), and 500 mcg. B12, biotin   Medications:   Current Outpatient Medications:     ascorbic acid/collagen hydr (COLLAGEN SKIN RENEWAL ORAL), Take by mouth. 1 Gummy daily, Disp: , Rfl:     cetirizine (ZyrTEC) 10 mg tablet, Take 1 tablet (10 mg) by mouth once daily., Disp: , Rfl:     multivitamin tablet, Take 1 tablet by mouth once daily., Disp: , Rfl:     omega " 3-dha-epa-fish oil (Fish OiL) 1,000 mg (120 mg-180 mg) capsule, Take 1 capsule (1,000 mg) by mouth once daily., Disp: , Rfl:     omeprazole (PriLOSEC) 40 mg DR capsule, Take 1 capsule (40 mg) by mouth once daily in the morning. Take before meals. Please remove capsule and use granules and mix with sugar free pudding or jello, Disp: 30 capsule, Rfl: 1    omeprazole (PriLOSEC) 40 mg DR capsule, Take 1 capsule (40 mg) by mouth 2 times a day before meals. Please remove capsule and use granules and mix with sugar free pudding or jello, Disp: 60 capsule, Rfl: 1    Physical Activity: walking dog daily for 30 min; light resistance exercise 3x/wk for 30-45 min.    READINESS TO LEARN:  Motivation to learn:  Good       Understanding of instruction: Good  Anticipated Compliance: Good  Family Support: Unable to assess-family not present     The pt is 3 months postop sleeve gastrectomy.   Patient has lost 50.0 pounds since initial assessment accounting for  45.0% loss excess body weight.  Tolerating regular diet without difficulty.  Protein intake is adequate for post-op individual.  Fluid consumption is adequate.  Patient is working on supplementing recommended vitamin/minerals.  Pt is feeling well and is not having any difficulties.  Constipation is managed with MiraLAX    Malnutrition Screening  Significant unintentional weight loss? n/a  Eating less than 75% of usual intake for more than 2 weeks? n/a    Nutrition Diagnosis:   Increased protein and nutrition needs related to altered GI function as evidenced by pt. s/p sleeve gastrectomy.  Food- and nutrition-related knowledge deficit related to lack of prior exposure to surgical weight loss information as evidenced by diet recall.     Nutrition Interventions:   Modify type and amount of food and nutrients within meals and snacks.  Comprehensive Nutrition Education  -Nutrition education materials: none      Recommendations:    Continue to eat 60-70 g of protein per day.  Increase vegetables per tolerance.  Continue to drink 64 oz. of zero calorie beverages per day.  Continue no drinking 30 min before, during the meal and for 30 minutes after the meal.  Eat slowly, chew thoroughly.  Continue regular exercise and increase intensity and duration.  Continue current vit/min regimen, discontinue extra MVI. Take at least 18 mg. Iron daily and increase calcium to 3 chews daily.  You may swallow pills if you prefer. (600 mg. Calcium citrate 2x/day or 2 pills twice daily  by 2 hours and 2 hours separate from your MVI)  Attend support Groups as needed.    Nutrition Monitoring and Evaluation:   1-2 pounds weight loss per week  Criteria: weight check, food recall  Need for Follow-up: 5-6 months post-op, sooner as needed.

## 2025-01-09 ENCOUNTER — APPOINTMENT (OUTPATIENT)
Dept: SURGERY | Facility: CLINIC | Age: 40
End: 2025-01-09
Payer: COMMERCIAL

## 2025-01-09 VITALS — BODY MASS INDEX: 34.07 KG/M2 | HEIGHT: 69 IN | WEIGHT: 230 LBS

## 2025-01-09 DIAGNOSIS — Z98.84 S/P LAPAROSCOPIC SLEEVE GASTRECTOMY: ICD-10-CM

## 2025-01-09 DIAGNOSIS — K91.2 POSTOPERATIVE MALABSORPTION (HHS-HCC): Primary | ICD-10-CM

## 2025-01-09 PROCEDURE — 99024 POSTOP FOLLOW-UP VISIT: CPT

## 2025-01-09 PROCEDURE — 3008F BODY MASS INDEX DOCD: CPT

## 2025-01-14 ENCOUNTER — APPOINTMENT (OUTPATIENT)
Dept: SURGERY | Facility: CLINIC | Age: 40
End: 2025-01-14
Payer: COMMERCIAL

## 2025-01-14 VITALS — BODY MASS INDEX: 34.07 KG/M2 | WEIGHT: 230 LBS | HEIGHT: 69 IN

## 2025-01-14 DIAGNOSIS — Z98.84 S/P LAPAROSCOPIC SLEEVE GASTRECTOMY: Primary | ICD-10-CM

## 2025-02-09 LAB
25(OH)D3+25(OH)D2 SERPL-MCNC: 29 NG/ML (ref 30–100)
ALBUMIN SERPL-MCNC: 4.2 G/DL (ref 3.6–5.1)
ALP SERPL-CCNC: 52 U/L (ref 31–125)
ALT SERPL-CCNC: 13 U/L (ref 6–29)
ANION GAP SERPL CALCULATED.4IONS-SCNC: 9 MMOL/L (CALC) (ref 7–17)
AST SERPL-CCNC: 12 U/L (ref 10–30)
BILIRUB SERPL-MCNC: 0.2 MG/DL (ref 0.2–1.2)
BUN SERPL-MCNC: 8 MG/DL (ref 7–25)
CALCIUM SERPL-MCNC: 9.3 MG/DL (ref 8.6–10.2)
CHLORIDE SERPL-SCNC: 106 MMOL/L (ref 98–110)
CO2 SERPL-SCNC: 24 MMOL/L (ref 20–32)
CREAT SERPL-MCNC: 0.87 MG/DL (ref 0.5–0.97)
EGFRCR SERPLBLD CKD-EPI 2021: 88 ML/MIN/1.73M2
ERYTHROCYTE [DISTWIDTH] IN BLOOD BY AUTOMATED COUNT: 11.8 % (ref 11–15)
FERRITIN SERPL-MCNC: 151 NG/ML (ref 16–154)
FOLATE SERPL-MCNC: >24 NG/ML
GLUCOSE SERPL-MCNC: 81 MG/DL (ref 65–99)
HCT VFR BLD AUTO: 36.1 % (ref 35–45)
HGB BLD-MCNC: 12.1 G/DL (ref 11.7–15.5)
IRON SATN MFR SERPL: 7 % (CALC) (ref 16–45)
IRON SERPL-MCNC: 21 MCG/DL (ref 40–190)
MCH RBC QN AUTO: 30.6 PG (ref 27–33)
MCHC RBC AUTO-ENTMCNC: 33.5 G/DL (ref 32–36)
MCV RBC AUTO: 91.4 FL (ref 80–100)
PLATELET # BLD AUTO: 304 THOUSAND/UL (ref 140–400)
PMV BLD REES-ECKER: 10.9 FL (ref 7.5–12.5)
POTASSIUM SERPL-SCNC: 4.2 MMOL/L (ref 3.5–5.3)
PROT SERPL-MCNC: 6.8 G/DL (ref 6.1–8.1)
PTH-INTACT SERPL-MCNC: 16 PG/ML (ref 16–77)
RBC # BLD AUTO: 3.95 MILLION/UL (ref 3.8–5.1)
SODIUM SERPL-SCNC: 139 MMOL/L (ref 135–146)
TIBC SERPL-MCNC: 286 MCG/DL (CALC) (ref 250–450)
VIT B1 BLD-SCNC: 114 NMOL/L (ref 78–185)
VIT B12 SERPL-MCNC: 583 PG/ML (ref 200–1100)
WBC # BLD AUTO: 4.2 THOUSAND/UL (ref 3.8–10.8)

## 2025-02-11 DIAGNOSIS — K91.2 POSTOPERATIVE MALABSORPTION (HHS-HCC): Primary | ICD-10-CM

## 2025-02-11 DIAGNOSIS — E53.8 VITAMIN B12 DEFICIENCY: ICD-10-CM

## 2025-02-11 RX ORDER — CYANOCOBALAMIN 1000 UG/ML
1000 INJECTION, SOLUTION INTRAMUSCULAR; SUBCUTANEOUS
Qty: 1 ML | Refills: 11 | Status: SHIPPED | OUTPATIENT
Start: 2025-02-11 | End: 2026-02-06

## 2025-03-13 ENCOUNTER — APPOINTMENT (OUTPATIENT)
Dept: SURGERY | Facility: CLINIC | Age: 40
End: 2025-03-13
Payer: COMMERCIAL

## 2025-03-13 VITALS — HEIGHT: 69 IN | WEIGHT: 223 LBS | BODY MASS INDEX: 33.03 KG/M2

## 2025-03-13 DIAGNOSIS — Z98.84 S/P LAPAROSCOPIC SLEEVE GASTRECTOMY: Primary | ICD-10-CM

## 2025-03-13 NOTE — PROGRESS NOTES
"Follow Up Bariatric Nutrition Assessment    Name: Antionette Felix  MRN: [id number]  Date: 03/13/25     Surgery Date: 10/24/24  Surgeon: Diane  Procedure: sleeve gastrectomy    ASSESSMENT:  Current weight in pounds:   reported weight  Vitals:    03/13/25 1006   Weight: 101 kg (223 lb)        Ht:  1.753 m (5' 9\")      BMI:  Body mass index is 32.93 kg/m².  Previous weight in pound: 230.0  1/14/25  Initial start weight in pounds: 280.0  9/19/23  EBW in pounds: 111.0  Total weight change in pounds: 57.0  %EBW Lost: 51.4    PROGRESS:  Nutrition Interventions for last encounter   Continue to meet your goal of 60-70 g of protein per day.   Continue to drink 64 oz. of zero calorie beverages per day,  Continue no drinking 30 min before, during the meal and for 30 minutes after the meal  Continue to exercise and increase intensity and duration per tolerance.  Remember to eat slowly and chew thoroughly  Try one new food at a time to test for any intolerances.   Continue to take all of your vitamins and minerals.   Attend support groups as needed    CHANGES IN TREATMENT:   Patient met goals: mostly   24 hour food recall: 64 oz. fluid and 58 grams protein  Breakfast: 2 boiled eggs and 2 mini sausage  Snack: Activia light yogurt  Lunch: salad with 2 oz. cheese, light dressing  Snack: 5 Ritz crackers with 3 TBS. peanut butter  Dinner:  2 oz. skinless chicken and 1/2c. green beans  Snack: no  Beverages: water, diet tea  Alcohol: no    Vitamins:   Bariatric Pal MVI (without iron; will take with iron) 1500 mg calcium (Bariatric Advantage 250 mg. each), and  B12 (in MVI)  Medications:   Current Outpatient Medications:     ascorbic acid/collagen hydr (COLLAGEN SKIN RENEWAL ORAL), Take by mouth. 1 Gummy daily, Disp: , Rfl:     cetirizine (ZyrTEC) 10 mg tablet, Take 1 tablet (10 mg) by mouth once daily., Disp: , Rfl:     cyanocobalamin (Vitamin B-12) 1,000 mcg/mL injection, Inject 1 mL (1,000 mcg) into the muscle every 30 (thirty) days., " "Disp: 1 mL, Rfl: 11    multivitamin tablet, Take 1 tablet by mouth once daily., Disp: , Rfl:     omega 3-dha-epa-fish oil (Fish OiL) 1,000 mg (120 mg-180 mg) capsule, Take 1 capsule (1,000 mg) by mouth once daily., Disp: , Rfl:     omeprazole (PriLOSEC) 40 mg DR capsule, Take 1 capsule (40 mg) by mouth once daily in the morning. Take before meals. Please remove capsule and use granules and mix with sugar free pudding or jello, Disp: 30 capsule, Rfl: 1    omeprazole (PriLOSEC) 40 mg DR capsule, Take 1 capsule (40 mg) by mouth 2 times a day before meals. Please remove capsule and use granules and mix with sugar free pudding or jello, Disp: 60 capsule, Rfl: 1    syringe-needle,safety,disp unt 3 mL 25 gauge x 1\" syringe, Inject 1 Syringe into the muscle every 30 (thirty) days. Use for B12 injections, Disp: 3 each, Rfl: 3    Physical Activity: 3x/wk cardio/reistance 45 min.    READINESS TO LEARN:  Motivation to learn: Good        Understanding of instruction: Good  Anticipated Compliance: Good  Family Support: Unable to assess-family not present     The pt is 6 months postop sleeve gastrectomy.   Patient has lost 57.0 pounds since initial assessment accounting for 51.4% loss excess body weight.  Tolerating regular diet without difficulty.  Protein intake is almost adequate for post-op individual.  Fluid consumption is adequate.  Patient is working on supplementing recommended vitamin/minerals.  Pt is feeling well and is not having any difficulties.    Malnutrition Screening  Significant unintentional weight loss? n/a  Eating less than 75% of usual intake for more than 2 weeks? n/a    Nutrition Diagnosis:   Increased protein and nutrition needs related to altered GI function as evidenced by pt. s/p sleeve gastrectomy.  Food- and nutrition-related knowledge deficit related to lack of prior exposure to surgical weight loss information as evidenced by diet recall.     Nutrition Interventions:   Modify type and amount of food " and nutrients within meals and snacks.  Comprehensive Nutrition Education  -Nutrition education materials: none      Recommendations:    Continue to work on eating 60-70 g of protein per day.  Try snacking on light yogurt with PB Fit, string cheese, edamame or hummus with vegetables.  Continue to drink 64 oz. of zero calorie beverages per day.  Continue no drinking 30 min before, during the meal and for 30 minutes after the meal.  Eat slowly, chew thoroughly.  Continue regular exercise and increase intensity and duration.  Change MVI to Bariatric Pal with iron.  Attend support Groups as needed.    Nutrition Monitoring and Evaluation:   1-2 pounds weight loss per week  Criteria: weight check, food recall  Need for Follow-up: 9 months post-op, sooner as needed.

## 2025-03-15 DIAGNOSIS — K91.2 POSTOPERATIVE MALABSORPTION (HHS-HCC): ICD-10-CM

## 2025-03-15 DIAGNOSIS — Z98.84 S/P LAPAROSCOPIC SLEEVE GASTRECTOMY: ICD-10-CM

## 2025-03-18 RX ORDER — OMEPRAZOLE 40 MG/1
40 CAPSULE, DELAYED RELEASE ORAL
Qty: 60 CAPSULE | Refills: 1 | Status: SHIPPED | OUTPATIENT
Start: 2025-03-18 | End: 2025-05-17

## 2025-05-01 ENCOUNTER — APPOINTMENT (OUTPATIENT)
Dept: PRIMARY CARE | Facility: CLINIC | Age: 40
End: 2025-05-01
Payer: COMMERCIAL

## 2025-05-08 ENCOUNTER — OFFICE VISIT (OUTPATIENT)
Facility: HOSPITAL | Age: 40
End: 2025-05-08
Payer: COMMERCIAL

## 2025-05-08 ENCOUNTER — APPOINTMENT (OUTPATIENT)
Facility: HOSPITAL | Age: 40
End: 2025-05-08
Payer: COMMERCIAL

## 2025-05-08 VITALS
HEART RATE: 54 BPM | SYSTOLIC BLOOD PRESSURE: 113 MMHG | TEMPERATURE: 97.5 F | RESPIRATION RATE: 18 BRPM | HEIGHT: 69 IN | DIASTOLIC BLOOD PRESSURE: 71 MMHG | BODY MASS INDEX: 32.02 KG/M2 | WEIGHT: 216.2 LBS | OXYGEN SATURATION: 99 %

## 2025-05-08 DIAGNOSIS — R30.0 DYSURIA: Primary | ICD-10-CM

## 2025-05-08 ASSESSMENT — ENCOUNTER SYMPTOMS
FREQUENCY: 1
CHILLS: 0
HEMATURIA: 0
FATIGUE: 0
FLANK PAIN: 1
DIFFICULTY URINATING: 1
DIAPHORESIS: 0
UNEXPECTED WEIGHT CHANGE: 0
ACTIVITY CHANGE: 0
FEVER: 0
DYSURIA: 1

## 2025-05-08 ASSESSMENT — PAIN SCALES - GENERAL: PAINLEVEL_OUTOF10: 0-NO PAIN

## 2025-05-08 NOTE — PROGRESS NOTES
"Subjective   Patient ID: Antionette Felix is a 39 y.o. female who presents for UTI concern.    Patient reports concern for UTI:   -Described as an increased urgency to pee and discomfort at the end of urination. Not every day for the past week following sex with male partner.   Has had UTIs in the past and reports similar experience.   -No fevers  -Left flank pain 4 days ago, no pain since  -No burning or itching  -Urine is Cloudy pale yellow, no blood  -White vaginal discharge with slight occasional spotting    Sexually active with one male partner for past 5 years. Has not been sexually active since the onset of symptoms.  Uses IUD for BC, no period since 2018, became pregnant and then had IUD placed following C section in July 2019.     UTI   Associated symptoms include flank pain, frequency and urgency. Pertinent negatives include no chills or hematuria.     Review of Systems   Constitutional:  Negative for activity change, chills, diaphoresis, fatigue, fever and unexpected weight change.   Genitourinary:  Positive for decreased urine volume, difficulty urinating, dysuria (No burning sensation but discomfort), flank pain, frequency, urgency and vaginal discharge. Negative for enuresis, genital sores, hematuria, menstrual problem, pelvic pain and vaginal pain.       Objective   /71 (BP Location: Right arm, Patient Position: Sitting, BP Cuff Size: Adult)   Pulse 54   Temp 36.4 °C (97.5 °F) (Temporal)   Resp 18   Ht 1.753 m (5' 9\")   Wt 98.1 kg (216 lb 3.2 oz)   SpO2 99%   BMI 31.93 kg/m²     Physical Exam  Cardiovascular:      Rate and Rhythm: Normal rate and regular rhythm.      Heart sounds: Normal heart sounds.   Pulmonary:      Effort: Pulmonary effort is normal.      Breath sounds: Normal breath sounds.   Abdominal:      Palpations: Abdomen is soft.      Tenderness: There is no abdominal tenderness. There is no right CVA tenderness, left CVA tenderness, guarding or rebound.       Assessment/Plan "   Problem List Items Addressed This Visit    None  Visit Diagnoses         Dysuria    -  Primary    Relevant Orders    C. trachomatis / N. gonorrhoeae, Amplified, Urogenital (Completed)    HIV 1/2 Antigen/Antibody Screen with Reflex to Confirmation (Completed)    Hepatitis B Surface Antigen (Completed)    Hepatitis C Antibody (Completed)    Syphilis Screen with Reflex (Completed)    Trichomonas vaginalis, Amplified (Completed)    Urinalysis with Reflex Culture and Microscopic (Completed)          Antionette Felix is a 39 y.o. female who presents for UTI (Complaining about arm and rashes).    #Dysuria  - Given increased urinary urgency, discomfort, and brief flank pain that has since resolved I suspect an uncomplicated UTI, plan is to get a UA with reflux to urine culture and plan for Bactrim if positive.   - STI possible given change in vaginal discharge, spotting, and discomfort with urination, plan to offer STI panel to patient in addition to urine culture.   - patient declined  examination today    Maryjane Mooney, Medical Student    I was present and supervised the medical student involved in this documentation. I independently examined this patient on the date of service. I made edits to this documentation where appropriate and I agree with the above. This patient's assessment and plan were discussed with an attending.    Patient discussed with attending physician Dr. Banegas  Plan preliminary until cosigned by attending physician.    Enrique Cifuentes MD  Family Medicine  PGY-2

## 2025-05-10 LAB
APPEARANCE UR: ABNORMAL
BACTERIA #/AREA URNS HPF: ABNORMAL /HPF
BACTERIA UR CULT: ABNORMAL
BACTERIA UR CULT: ABNORMAL
BILIRUB UR QL STRIP: NEGATIVE
C TRACH RRNA SPEC QL NAA+PROBE: NOT DETECTED
COLOR UR: YELLOW
GLUCOSE UR QL STRIP: NEGATIVE
HBV SURFACE AG SERPL QL IA: NORMAL
HCV AB SERPL QL IA: NORMAL
HGB UR QL STRIP: ABNORMAL
HIV 1+2 AB+HIV1 P24 AG SERPL QL IA: NORMAL
HYALINE CASTS #/AREA URNS LPF: ABNORMAL /LPF
KETONES UR QL STRIP: NEGATIVE
LEUKOCYTE ESTERASE UR QL STRIP: ABNORMAL
N GONORRHOEA RRNA SPEC QL NAA+PROBE: NOT DETECTED
NITRITE UR QL STRIP: POSITIVE
PH UR STRIP: 7.5 [PH] (ref 5–8)
PROT UR QL STRIP: ABNORMAL
QUEST GC CT AMPLIFIED (ALWAYS MESSAGE): NORMAL
RBC #/AREA URNS HPF: ABNORMAL /HPF
SERVICE CMNT-IMP: ABNORMAL
SP GR UR STRIP: 1.02 (ref 1–1.03)
SQUAMOUS #/AREA URNS HPF: ABNORMAL /HPF
T PALLIDUM AB SER QL IA: NORMAL
T VAGINALIS RRNA SPEC QL NAA+PROBE: NOT DETECTED
WBC #/AREA URNS HPF: ABNORMAL /HPF

## 2025-05-12 DIAGNOSIS — N39.0 URINARY TRACT INFECTION WITHOUT HEMATURIA, SITE UNSPECIFIED: Primary | ICD-10-CM

## 2025-05-12 DIAGNOSIS — J30.2 SEASONAL ALLERGIES: ICD-10-CM

## 2025-05-12 DIAGNOSIS — E66.01 MORBID OBESITY (MULTI): ICD-10-CM

## 2025-05-12 LAB
APPEARANCE UR: ABNORMAL
BACTERIA #/AREA URNS HPF: ABNORMAL /HPF
BACTERIA UR CULT: ABNORMAL
BACTERIA UR CULT: ABNORMAL
BILIRUB UR QL STRIP: NEGATIVE
C TRACH RRNA SPEC QL NAA+PROBE: NOT DETECTED
COLOR UR: YELLOW
GLUCOSE UR QL STRIP: NEGATIVE
HBV SURFACE AG SERPL QL IA: NORMAL
HCV AB SERPL QL IA: NORMAL
HGB UR QL STRIP: ABNORMAL
HIV 1+2 AB+HIV1 P24 AG SERPL QL IA: NORMAL
HYALINE CASTS #/AREA URNS LPF: ABNORMAL /LPF
KETONES UR QL STRIP: NEGATIVE
LEUKOCYTE ESTERASE UR QL STRIP: ABNORMAL
N GONORRHOEA RRNA SPEC QL NAA+PROBE: NOT DETECTED
NITRITE UR QL STRIP: POSITIVE
PH UR STRIP: 7.5 [PH] (ref 5–8)
PROT UR QL STRIP: ABNORMAL
QUEST GC CT AMPLIFIED (ALWAYS MESSAGE): NORMAL
RBC #/AREA URNS HPF: ABNORMAL /HPF
SERVICE CMNT-IMP: ABNORMAL
SP GR UR STRIP: 1.02 (ref 1–1.03)
SQUAMOUS #/AREA URNS HPF: ABNORMAL /HPF
T PALLIDUM AB SER QL IA: NEGATIVE
T VAGINALIS RRNA SPEC QL NAA+PROBE: NOT DETECTED
WBC #/AREA URNS HPF: ABNORMAL /HPF

## 2025-05-12 RX ORDER — SULFAMETHOXAZOLE AND TRIMETHOPRIM 800; 160 MG/1; MG/1
1 TABLET ORAL 2 TIMES DAILY
Qty: 14 TABLET | Refills: 0 | Status: SHIPPED | OUTPATIENT
Start: 2025-05-12 | End: 2025-05-19

## 2025-05-13 RX ORDER — OMEPRAZOLE 40 MG/1
40 CAPSULE, DELAYED RELEASE ORAL
Qty: 30 CAPSULE | Refills: 1 | Status: SHIPPED | OUTPATIENT
Start: 2025-05-13

## 2025-05-13 RX ORDER — CETIRIZINE HYDROCHLORIDE 10 MG/1
10 TABLET ORAL DAILY
Qty: 90 TABLET | Refills: 1 | Status: SHIPPED | OUTPATIENT
Start: 2025-05-13

## 2025-06-03 NOTE — PROGRESS NOTES
"Follow Up Bariatric Nutrition Assessment    Name: Antionette Felix  MRN: [id number]  Date: 06/12/25     Surgery Date: 10/24/24  Surgeon: Diane    Procedure: sleeve gastrectomy    ASSESSMENT:  Current weight in pounds:     Vitals:    06/12/25 1004   Weight: 96.2 kg (212 lb)      Ht:  1.753 m (5' 9\")   BMI:  Body mass index is 31.31 kg/m².  Previous weight in pounds: 223.0  3/13/25  Initial start weight in pounds: 280.0  9/19/23  EBW in pounds: 111.0  Total weight change in pounds: 68.0  %EBW Lost: 61.3    PROGRESS:  Nutrition Interventions for last encounter   Continue to meet your goal of 60-70 g of protein per day.    Continue to drink 64 oz. of zero calorie beverages per day  Continue no drinking 30 min before, during the meal and for 30 minutes after the meal  Continue to exercise  Remember to eat slowly and chew thoroughly  Try one new food at a time to test for any intolerances.   Continue to take all of your vitamins and minerals.     CHANGES IN TREATMENT:   Patient met goals: yes   24 hour food recall: 80 oz. fluid and 82 grams protein  Breakfast: 2 eggs and Greek yogurt with 1/3 c. berkley seed and protein Granola  Snack: protein granola bar  Lunch: 3 oz. grilled skinless chicken over greens with FF dressing  Snack: protein granola bar  Dinner: 3 oz. baked skinless chicken with 3/4 c. cabbage with tomatoes  Snack: no  Beverages:  water, [place] zero  Alcohol: no    Vitamins:  Bariatric Pal MVI (without iron; will take with iron) 1500 mg calcium (Bariatric Advantage 250 mg. each), and  B12 (in MVI)   Medications: Current Medications[1]    Physical Activity: 4x/wk cardio 30 min and resistance 30 min.; 1x/wk cardio 60 min.    READINESS TO LEARN:  Motivation to learn:          Interested      Understanding of instruction:  Good      Family Support: Unable to assess-family not present      The pt is 8 months postop sleeve gastrectomy.   Patient has lost 68.0 pounds since initial assessment accounting for 61.3% loss " excess body weight.  Tolerating soft diet without difficulty.  Protein intake is adequate for post-op individual. Fluid consumption is adequate. Patient is supplementing recommended vitamin/minerals.      Malnutrition Screening  Significant unintentional weight loss? n/a  Eating less than 75% of usual intake for more than 2 weeks? n/a    Nutrition Diagnosis:   Increased protein and nutrition needs related to altered GI function as evidenced by pt. s/p sleeve gastrectomy/gastric bypass.  Food- and nutrition-related knowledge deficit related to lack of prior exposure to surgical weight loss information as evidenced by diet recall.     Nutrition Interventions:   Modify type and amount of food and nutrients within meals and snacks.  Comprehensive Nutrition Education  -Nutrition education materials: none   Recommendations:    1.   Continue to eat 60-80 g of protein per day.  Measure portions and continue to plan ahead for your food choices.  2.   Continue to drink 64 oz. of zero calorie beverages per day.  3.   Continue no drinking 30 min before, during the meal and for 30 minutes after the meal        4.   Increase intensity and duration of exercise.  5.   Eat slowly, chew thoroughly.        6.   Continue current vit/min regimen.  7.   Attend support groups as needed.    Nutrition Monitoring and Evaluation:   1-2 pounds weight loss per week  Criteria: weight check, food recall  Need for Follow-up: 12 months post-op, sooner as needed         [1]   Current Outpatient Medications:     ascorbic acid/collagen hydr (COLLAGEN SKIN RENEWAL ORAL), Take by mouth. 1 Gummy daily, Disp: , Rfl:     cetirizine (ZyrTEC) 10 mg tablet, Take 1 tablet (10 mg) by mouth once daily., Disp: 90 tablet, Rfl: 1    cyanocobalamin (Vitamin B-12) 1,000 mcg/mL injection, Inject 1 mL (1,000 mcg) into the muscle every 30 (thirty) days., Disp: 1 mL, Rfl: 11    multivitamin tablet, Take 1 tablet by mouth once daily., Disp: , Rfl:     omega 3-dha-epa-fish  oil (Fish OiL) 1,000 mg (120 mg-180 mg) capsule, Take 1 capsule (1,000 mg) by mouth once daily., Disp: , Rfl:     omeprazole (PriLOSEC) 40 mg DR capsule, Take 1 capsule (40 mg) by mouth once daily in the morning. Take before meals. Please remove capsule and use granules and mix with sugar free pudding or jello, Disp: 30 capsule, Rfl: 1

## 2025-06-12 ENCOUNTER — APPOINTMENT (OUTPATIENT)
Dept: SURGERY | Facility: CLINIC | Age: 40
End: 2025-06-12
Payer: COMMERCIAL

## 2025-06-12 VITALS — BODY MASS INDEX: 31.4 KG/M2 | WEIGHT: 212 LBS | HEIGHT: 69 IN

## 2025-06-12 DIAGNOSIS — E66.811 OBESITY (BMI 30.0-34.9): ICD-10-CM

## 2025-06-12 DIAGNOSIS — Z98.84 S/P LAPAROSCOPIC SLEEVE GASTRECTOMY: Primary | ICD-10-CM

## 2025-07-02 ENCOUNTER — APPOINTMENT (OUTPATIENT)
Dept: OBSTETRICS AND GYNECOLOGY | Facility: CLINIC | Age: 40
End: 2025-07-02
Payer: COMMERCIAL

## 2025-07-02 VITALS
HEART RATE: 55 BPM | SYSTOLIC BLOOD PRESSURE: 110 MMHG | DIASTOLIC BLOOD PRESSURE: 70 MMHG | BODY MASS INDEX: 30.94 KG/M2 | WEIGHT: 209.5 LBS

## 2025-07-02 DIAGNOSIS — Z30.433 ENCOUNTER FOR REMOVAL AND REINSERTION OF INTRAUTERINE CONTRACEPTIVE DEVICE (IUD): Primary | ICD-10-CM

## 2025-07-02 PROCEDURE — 58300 INSERT INTRAUTERINE DEVICE: CPT | Mod: GC

## 2025-07-02 PROCEDURE — 58301 REMOVE INTRAUTERINE DEVICE: CPT | Mod: GC

## 2025-07-02 PROCEDURE — 2500000004 HC RX 250 GENERAL PHARMACY W/ HCPCS (ALT 636 FOR OP/ED): Mod: SE | Performed by: STUDENT IN AN ORGANIZED HEALTH CARE EDUCATION/TRAINING PROGRAM

## 2025-07-02 RX ADMIN — LEVONORGESTREL: 52 INTRAUTERINE DEVICE INTRAUTERINE at 13:00

## 2025-07-02 NOTE — PROGRESS NOTES
Patient ID: Antionette Felix is a 39 y.o. female who presents to clinic for IUD removal and reinsertion.     Current IUD type: Liletta (placed 2019 during CS)  Menstrual: typically experiences only premenstrual symptoms but the last 2 mos she has experienced actual bleeding requiring use of pads  Fertility Plans: open to another child at some point but unsure  OBHx:     Objective   Visit Vitals  /70   Pulse 55        IUD Management    Performed by: Christo Louis MD  Authorized by: Emili Garcia MD    Procedure: IUD removal and insertion    Consent obtained by patient, parent, or legal power of  - including discussion of procedure risks and benefits, patient questions answered, and patient education provided: yes    Reason for removal: patient request    Strings visualized: yes    Cervix cleaned with: iodopovidone    Tenaculum applied to cervix: yes    Cervix manually dilated: no    IUD grasped by forceps: yes    Performed with ultrasound guidance: no    IUD removed: yes    Date/Time of Removal:  2025 2:01 PM  Removed without complications: yes    IUD intact: yes    Date/Time of Insertion:  2025 2:02 PM  Pelvic exam performed: yes    Speculum placed in vagina: yes    Cervix cleaned and prepped: yes    Tenaculum/Allis/Ring Forceps applied to cervix: yes    Uterus sound depth (cm):  10  IUD inserted without complications: yes    OSM: levonorgestreL 20.1 mcg/24 hr  Strings trimmed to (cm):  2  Patient tolerated procedure well: yes    Intended removal date: 8 years      Assessment/Plan     IUD Removal   -IUD removed and replaced (Liletta) without issue   -discussed that IUD can last for up to 8yrs but can be removed sooner if desired.     Pt seen & d/w Dr. Jose Louis MD  OBGYN, PGY-2

## 2025-07-13 DIAGNOSIS — E66.01 MORBID OBESITY (MULTI): ICD-10-CM

## 2025-07-16 DIAGNOSIS — K29.30 CHRONIC SUPERFICIAL GASTRITIS WITHOUT BLEEDING: ICD-10-CM

## 2025-07-16 RX ORDER — OMEPRAZOLE 40 MG/1
40 CAPSULE, DELAYED RELEASE ORAL
Qty: 30 CAPSULE | Refills: 2 | Status: CANCELLED | OUTPATIENT
Start: 2025-07-16 | End: 2025-10-14

## 2025-07-17 RX ORDER — OMEPRAZOLE 40 MG/1
40 CAPSULE, DELAYED RELEASE ORAL
Qty: 30 CAPSULE | Refills: 1 | Status: SHIPPED | OUTPATIENT
Start: 2025-07-17

## 2025-10-23 ENCOUNTER — APPOINTMENT (OUTPATIENT)
Dept: SURGERY | Facility: CLINIC | Age: 40
End: 2025-10-23
Payer: COMMERCIAL

## (undated) DEVICE — SYSTEM, GASTRECTOMY SLEEVE, 36FR W/O BULB, VISIGI 3D

## (undated) DEVICE — TUBE SET, PNEUMOCLEAR, SMOKE EVACU, HIGH-FLOW

## (undated) DEVICE — ACCESS SYS, KII SHIELDED BLADED, Z-THREAD, 12X100CM

## (undated) DEVICE — CLOSURE SYSTEM, DERMABOND, PRINEO, 22CM, STERILE

## (undated) DEVICE — ASSEMBLY, STRYKER FLOW 2, SUCTION IRRIGATOR, WITH TIP

## (undated) DEVICE — ENDO, CLIP, 5MM, M/L

## (undated) DEVICE — SOLUTION, INJECTION, USP, SODIUM CHLORIDE 0.9%, .9, NACL, 1000 ML, BAG

## (undated) DEVICE — Device

## (undated) DEVICE — STAPLER,  LINEAR ENDO 60MM RELOAD, GREEN, DISP

## (undated) DEVICE — SCISSOR, MINI ENDO CUT, TIPS, DISP

## (undated) DEVICE — SYRINGE, 60 CC, IRRIGATION, BULB, CONTRO-BULB, PAPER POUCH

## (undated) DEVICE — TITAN KIT, SGS  23CM, STAPLER WITH SLR

## (undated) DEVICE — DISSECTOR, ULTRASONIC, CURVED JAW 48CM

## (undated) DEVICE — SUTURE, CTD, VICRYL 3-0, UND, BR, SH-1

## (undated) DEVICE — DRAPE, INSTRUMENT, W/POUCH, STERI DRAPE, 9 5/8 X 18 LONG

## (undated) DEVICE — SUTURE, SILK, 0, 30 IN, BR, BLACK

## (undated) DEVICE — SUTURE, VICRYL, 4-0, 18 IN, PS2, UNDYED

## (undated) DEVICE — STAPLELINE, BIOABSORB, SEAMGUARD, 60

## (undated) DEVICE — STAPLER, ECHELON 3000, 60MM LONG

## (undated) DEVICE — CARE KIT, LAPAROSCOPIC, ADVANCED

## (undated) DEVICE — STAPLER,  ENDO ECHELON 60MM RELOAD, GOLD,

## (undated) DEVICE — STAPLER, LINEAR ENDO RELOAD, 60MM, BLUE, DISP

## (undated) DEVICE — TROCAR, KII OPTICAL BLADELESS 5MM Z THREAD 100MM LNGTH